# Patient Record
Sex: FEMALE | Race: OTHER | HISPANIC OR LATINO | URBAN - METROPOLITAN AREA
[De-identification: names, ages, dates, MRNs, and addresses within clinical notes are randomized per-mention and may not be internally consistent; named-entity substitution may affect disease eponyms.]

---

## 2017-04-05 ENCOUNTER — EMERGENCY (EMERGENCY)
Facility: HOSPITAL | Age: 47
LOS: 1 days | Discharge: HOME | End: 2017-04-05
Attending: EMERGENCY MEDICINE
Payer: COMMERCIAL

## 2017-04-05 VITALS
OXYGEN SATURATION: 98 % | DIASTOLIC BLOOD PRESSURE: 60 MMHG | RESPIRATION RATE: 18 BRPM | TEMPERATURE: 98 F | HEART RATE: 70 BPM | SYSTOLIC BLOOD PRESSURE: 111 MMHG

## 2017-04-05 VITALS
OXYGEN SATURATION: 100 % | DIASTOLIC BLOOD PRESSURE: 51 MMHG | TEMPERATURE: 98 F | SYSTOLIC BLOOD PRESSURE: 103 MMHG | HEIGHT: 63 IN | WEIGHT: 169.98 LBS | RESPIRATION RATE: 982 BRPM | HEART RATE: 62 BPM

## 2017-04-05 DIAGNOSIS — Z98.89 OTHER SPECIFIED POSTPROCEDURAL STATES: Chronic | ICD-10-CM

## 2017-04-05 DIAGNOSIS — Z98.51 TUBAL LIGATION STATUS: Chronic | ICD-10-CM

## 2017-04-05 DIAGNOSIS — N60.02 SOLITARY CYST OF LEFT BREAST: Chronic | ICD-10-CM

## 2017-04-05 DIAGNOSIS — M25.571 PAIN IN RIGHT ANKLE AND JOINTS OF RIGHT FOOT: Chronic | ICD-10-CM

## 2017-04-05 LAB
ALBUMIN SERPL ELPH-MCNC: 3.4 G/DL — LOW (ref 3.5–5)
ALP SERPL-CCNC: 67 U/L — SIGNIFICANT CHANGE UP (ref 40–120)
ALT FLD-CCNC: 27 U/L DA — SIGNIFICANT CHANGE UP (ref 10–60)
ANION GAP SERPL CALC-SCNC: 7 MMOL/L — SIGNIFICANT CHANGE UP (ref 5–17)
AST SERPL-CCNC: 20 U/L — SIGNIFICANT CHANGE UP (ref 10–40)
BASOPHILS # BLD AUTO: 0.1 K/UL — SIGNIFICANT CHANGE UP (ref 0–0.2)
BASOPHILS NFR BLD AUTO: 1.1 % — SIGNIFICANT CHANGE UP (ref 0–2)
BILIRUB SERPL-MCNC: 0.4 MG/DL — SIGNIFICANT CHANGE UP (ref 0.2–1.2)
BUN SERPL-MCNC: 11 MG/DL — SIGNIFICANT CHANGE UP (ref 7–18)
CALCIUM SERPL-MCNC: 8.4 MG/DL — SIGNIFICANT CHANGE UP (ref 8.4–10.5)
CHLORIDE SERPL-SCNC: 107 MMOL/L — SIGNIFICANT CHANGE UP (ref 96–108)
CK SERPL-CCNC: 95 U/L — SIGNIFICANT CHANGE UP (ref 21–215)
CO2 SERPL-SCNC: 27 MMOL/L — SIGNIFICANT CHANGE UP (ref 22–31)
CREAT SERPL-MCNC: 0.52 MG/DL — SIGNIFICANT CHANGE UP (ref 0.5–1.3)
EOSINOPHIL # BLD AUTO: 0.3 K/UL — SIGNIFICANT CHANGE UP (ref 0–0.5)
EOSINOPHIL NFR BLD AUTO: 3.5 % — SIGNIFICANT CHANGE UP (ref 0–6)
GLUCOSE SERPL-MCNC: 103 MG/DL — HIGH (ref 70–99)
HCG SERPL-ACNC: 4 MIU/ML — SIGNIFICANT CHANGE UP
HCT VFR BLD CALC: 42.1 % — SIGNIFICANT CHANGE UP (ref 34.5–45)
HGB BLD-MCNC: 13.8 G/DL — SIGNIFICANT CHANGE UP (ref 11.5–15.5)
LYMPHOCYTES # BLD AUTO: 3.4 K/UL — HIGH (ref 1–3.3)
LYMPHOCYTES # BLD AUTO: 35.9 % — SIGNIFICANT CHANGE UP (ref 13–44)
MCHC RBC-ENTMCNC: 29.8 PG — SIGNIFICANT CHANGE UP (ref 27–34)
MCHC RBC-ENTMCNC: 32.7 GM/DL — SIGNIFICANT CHANGE UP (ref 32–36)
MCV RBC AUTO: 90.9 FL — SIGNIFICANT CHANGE UP (ref 80–100)
MONOCYTES # BLD AUTO: 0.6 K/UL — SIGNIFICANT CHANGE UP (ref 0–0.9)
MONOCYTES NFR BLD AUTO: 6.1 % — SIGNIFICANT CHANGE UP (ref 2–14)
NEUTROPHILS # BLD AUTO: 5.1 K/UL — SIGNIFICANT CHANGE UP (ref 1.8–7.4)
NEUTROPHILS NFR BLD AUTO: 53.5 % — SIGNIFICANT CHANGE UP (ref 43–77)
PLATELET # BLD AUTO: 280 K/UL — SIGNIFICANT CHANGE UP (ref 150–400)
POTASSIUM SERPL-MCNC: 4.2 MMOL/L — SIGNIFICANT CHANGE UP (ref 3.5–5.3)
POTASSIUM SERPL-SCNC: 4.2 MMOL/L — SIGNIFICANT CHANGE UP (ref 3.5–5.3)
PROT SERPL-MCNC: 6.8 G/DL — SIGNIFICANT CHANGE UP (ref 6–8.3)
RBC # BLD: 4.63 M/UL — SIGNIFICANT CHANGE UP (ref 3.8–5.2)
RBC # FLD: 13 % — SIGNIFICANT CHANGE UP (ref 10.3–14.5)
SODIUM SERPL-SCNC: 141 MMOL/L — SIGNIFICANT CHANGE UP (ref 135–145)
TROPONIN I SERPL-MCNC: <0.015 NG/ML — SIGNIFICANT CHANGE UP (ref 0–0.04)
WBC # BLD: 9.6 K/UL — SIGNIFICANT CHANGE UP (ref 3.8–10.5)
WBC # FLD AUTO: 9.6 K/UL — SIGNIFICANT CHANGE UP (ref 3.8–10.5)

## 2017-04-05 PROCEDURE — 85027 COMPLETE CBC AUTOMATED: CPT

## 2017-04-05 PROCEDURE — 93005 ELECTROCARDIOGRAM TRACING: CPT

## 2017-04-05 PROCEDURE — 96374 THER/PROPH/DIAG INJ IV PUSH: CPT

## 2017-04-05 PROCEDURE — 99284 EMERGENCY DEPT VISIT MOD MDM: CPT | Mod: 25

## 2017-04-05 PROCEDURE — 80053 COMPREHEN METABOLIC PANEL: CPT

## 2017-04-05 PROCEDURE — 99285 EMERGENCY DEPT VISIT HI MDM: CPT

## 2017-04-05 PROCEDURE — 84484 ASSAY OF TROPONIN QUANT: CPT

## 2017-04-05 PROCEDURE — 71046 X-RAY EXAM CHEST 2 VIEWS: CPT

## 2017-04-05 PROCEDURE — 71020: CPT | Mod: 26

## 2017-04-05 PROCEDURE — 82550 ASSAY OF CK (CPK): CPT

## 2017-04-05 PROCEDURE — 84702 CHORIONIC GONADOTROPIN TEST: CPT

## 2017-04-05 RX ORDER — FAMOTIDINE 10 MG/ML
20 INJECTION INTRAVENOUS ONCE
Qty: 0 | Refills: 0 | Status: COMPLETED | OUTPATIENT
Start: 2017-04-05 | End: 2017-04-05

## 2017-04-05 RX ADMIN — FAMOTIDINE 20 MILLIGRAM(S): 10 INJECTION INTRAVENOUS at 14:29

## 2017-04-05 RX ADMIN — Medication 30 MILLILITER(S): at 14:29

## 2017-04-05 NOTE — ED PROVIDER NOTE - OBJECTIVE STATEMENT
45 y/o F pt w/ PMHx of DM presents to the ED c/o  chest pain last night x1 week. Pain is burning, radiates to L arm and intermittent. Denies fever chills, cough, recent travel or any other complaints. NKDA. 45 y/o F pt w/ PMHx of DM presents to the ED c/o  chest pain last night and once again 1 week ago. Pain is burning, radiates to L arm and intermittent. Denies fever chills, cough, recent travel or any other complaints. NKDA.

## 2017-04-05 NOTE — ED PROVIDER NOTE - MEDICAL DECISION MAKING DETAILS
45 y/o F pt w/ burning chest pain. Will get labs, chest X-ray. 2 troponin normal will dc home. 47 y/o F pt w/ burning chest pain reproducible on palpation. symptoms improved with GI cocktail. Will get labs, chest X-ray. troponin normal will dc home.

## 2017-04-09 DIAGNOSIS — E11.9 TYPE 2 DIABETES MELLITUS WITHOUT COMPLICATIONS: ICD-10-CM

## 2017-04-09 DIAGNOSIS — R07.9 CHEST PAIN, UNSPECIFIED: ICD-10-CM

## 2017-05-11 ENCOUNTER — EMERGENCY (EMERGENCY)
Facility: HOSPITAL | Age: 47
LOS: 1 days | Discharge: HOME | End: 2017-05-11
Attending: EMERGENCY MEDICINE
Payer: MEDICARE

## 2017-05-11 VITALS
DIASTOLIC BLOOD PRESSURE: 68 MMHG | RESPIRATION RATE: 16 BRPM | HEIGHT: 64 IN | WEIGHT: 154.98 LBS | HEART RATE: 78 BPM | SYSTOLIC BLOOD PRESSURE: 118 MMHG | OXYGEN SATURATION: 100 % | TEMPERATURE: 98 F

## 2017-05-11 DIAGNOSIS — Z98.89 OTHER SPECIFIED POSTPROCEDURAL STATES: Chronic | ICD-10-CM

## 2017-05-11 DIAGNOSIS — Z98.51 TUBAL LIGATION STATUS: Chronic | ICD-10-CM

## 2017-05-11 DIAGNOSIS — N60.02 SOLITARY CYST OF LEFT BREAST: Chronic | ICD-10-CM

## 2017-05-11 DIAGNOSIS — M25.571 PAIN IN RIGHT ANKLE AND JOINTS OF RIGHT FOOT: Chronic | ICD-10-CM

## 2017-05-11 PROCEDURE — 99283 EMERGENCY DEPT VISIT LOW MDM: CPT

## 2017-05-11 RX ORDER — IBUPROFEN 200 MG
1 TABLET ORAL
Qty: 28 | Refills: 0 | OUTPATIENT
Start: 2017-05-11 | End: 2017-05-18

## 2017-05-11 RX ORDER — IBUPROFEN 200 MG
600 TABLET ORAL ONCE
Qty: 0 | Refills: 0 | Status: COMPLETED | OUTPATIENT
Start: 2017-05-11 | End: 2017-05-11

## 2017-05-11 RX ORDER — MUPIROCIN 20 MG/G
1 OINTMENT TOPICAL
Qty: 45 | Refills: 0 | OUTPATIENT
Start: 2017-05-11 | End: 2017-05-21

## 2017-05-11 RX ADMIN — Medication 600 MILLIGRAM(S): at 22:24

## 2017-05-11 RX ADMIN — Medication 600 MILLIGRAM(S): at 21:56
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
## 2017-05-15 DIAGNOSIS — T21.12XA BURN OF FIRST DEGREE OF ABDOMINAL WALL, INITIAL ENCOUNTER: ICD-10-CM
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
## 2017-08-01 ENCOUNTER — EMERGENCY (EMERGENCY)
Facility: HOSPITAL | Age: 47
LOS: 1 days | Discharge: HOME | End: 2017-08-01
Attending: EMERGENCY MEDICINE
Payer: MEDICARE

## 2017-08-01 VITALS
HEIGHT: 63 IN | SYSTOLIC BLOOD PRESSURE: 98 MMHG | HEART RATE: 70 BPM | TEMPERATURE: 99 F | DIASTOLIC BLOOD PRESSURE: 62 MMHG | RESPIRATION RATE: 16 BRPM | OXYGEN SATURATION: 100 % | WEIGHT: 169.98 LBS
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
## 2017-08-01 DIAGNOSIS — Z79.1 LONG TERM (CURRENT) USE OF NON-STEROIDAL ANTI-INFLAMMATORIES (NSAID): ICD-10-CM
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
## 2017-08-01 DIAGNOSIS — E11.9 TYPE 2 DIABETES MELLITUS WITHOUT COMPLICATIONS: ICD-10-CM

## 2017-08-01 DIAGNOSIS — S89.91XA UNSPECIFIED INJURY OF RIGHT LOWER LEG, INITIAL ENCOUNTER: ICD-10-CM

## 2017-08-01 DIAGNOSIS — N60.02 SOLITARY CYST OF LEFT BREAST: Chronic | ICD-10-CM

## 2017-08-01 DIAGNOSIS — Z79.84 LONG TERM (CURRENT) USE OF ORAL HYPOGLYCEMIC DRUGS: ICD-10-CM

## 2017-08-01 DIAGNOSIS — Z98.89 OTHER SPECIFIED POSTPROCEDURAL STATES: Chronic | ICD-10-CM

## 2017-08-01 DIAGNOSIS — Z98.51 TUBAL LIGATION STATUS: Chronic | ICD-10-CM
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
## 2017-08-01 DIAGNOSIS — M25.461 EFFUSION, RIGHT KNEE: ICD-10-CM

## 2017-08-01 DIAGNOSIS — Z98.51 TUBAL LIGATION STATUS: ICD-10-CM

## 2017-08-01 DIAGNOSIS — M25.571 PAIN IN RIGHT ANKLE AND JOINTS OF RIGHT FOOT: Chronic | ICD-10-CM

## 2017-08-01 PROCEDURE — 29530 STRAPPING OF KNEE: CPT

## 2017-08-01 PROCEDURE — 29530 STRAPPING OF KNEE: CPT | Mod: RT

## 2017-08-01 PROCEDURE — 73562 X-RAY EXAM OF KNEE 3: CPT | Mod: 26,RT

## 2017-08-01 PROCEDURE — 99283 EMERGENCY DEPT VISIT LOW MDM: CPT | Mod: 25

## 2017-08-01 PROCEDURE — 73562 X-RAY EXAM OF KNEE 3: CPT

## 2017-08-01 RX ORDER — IBUPROFEN 200 MG
1 TABLET ORAL
Qty: 20 | Refills: 0 | OUTPATIENT
Start: 2017-08-01 | End: 2017-08-06

## 2017-08-01 RX ORDER — IBUPROFEN 200 MG
600 TABLET ORAL ONCE
Qty: 0 | Refills: 0 | Status: COMPLETED | OUTPATIENT
Start: 2017-08-01 | End: 2017-08-01

## 2017-08-01 RX ADMIN — Medication 600 MILLIGRAM(S): at 19:22

## 2017-08-01 NOTE — ED PROVIDER NOTE - PHYSICAL EXAMINATION
Right knee anterior swelling with mild effusion. Localized medial bony tenderness to palpation. Limited ROM knee secondary to pain. No skin breakdown. No laxity during varus/valgus stress.

## 2017-08-01 NOTE — ED PROVIDER NOTE - PSH
Ankle arthralgia, right  surgey  Breast cyst, left    S/P  section  twice  S/P knee surgery  right  S/P knee surgery  right knee twice  S/P shoulder surgery  right  S/P tubal ligation  17 years ago

## 2017-08-01 NOTE — ED ADULT NURSE NOTE - OBJECTIVE STATEMENT
pt from home c/o of Rt knee pain s/p trip and fall 2 days ago pt is alert awake oriented x3 Rt knee skin dry and intact no edema noted

## 2017-08-01 NOTE — ED PROVIDER NOTE - PROGRESS NOTE DETAILS
xray shows no signs of fracture or dislocation. Will apply ACE bandage, ice, knee immobilizer, crutches and patient will need to follow up with her orthopedist in 1 week. Pt is well appearing walking with steady gait, stable for discharge and follow up without fail with medical doctor. I had a detailed discussion with the patient and/or guardian regarding the historical points, exam findings, and any diagnostic results supporting the discharge diagnosis. Pt educated on care and need for follow up. Strict return instructions and red flag signs and symptoms discussed with patient. Questions answered. Pt shows understanding of discharge information and agrees to follow.

## 2017-08-01 NOTE — ED PROVIDER NOTE - MEDICAL DECISION MAKING DETAILS
46 year-old female, presents s/p right knee injury 3 days ago. Appears uncomfortable, vital signs within normal limits, afebrile. Plan: xray r/o fracture, knee immobilizer, crutches and orth follow up.

## 2017-08-01 NOTE — ED ADULT NURSE NOTE - DISCHARGE TEACHING
crutches instruction given via teach back and demonstration pt ambulated steadily with crutches in hallway

## 2017-08-01 NOTE — ED PROCEDURE NOTE - CPROC ED POST PROC CARE GUIDE1
Instructed patient/caregiver to follow-up with primary care physician./Elevate the injured extremity as instructed./Instructed patient/caregiver regarding signs and symptoms of infection./Verbal/written post procedure instructions were given to patient/caregiver./Keep the cast/splint/dressing clean and dry.

## 2017-08-01 NOTE — ED PROVIDER NOTE - OBJECTIVE STATEMENT
46 year-old female, history of right knee ligament tear (scheduled for surgery this month), presents with cc right knee injury 3 days ago. While walking, lost balance over an uneven surface and twisted right knee and fell forward landing on the knee. Was able to walk with difficulty. Now c/o right knee sharp anterior-medial pain, continuous, localized, worse with ambulation, no alleviating factors, associated with swelling. Also c/o feeling of knee instability while walking. Denies numbness, tingling, back pain, neck pain, other injuries or any other complaints.

## 2017-08-08 ENCOUNTER — OUTPATIENT (OUTPATIENT)
Dept: OUTPATIENT SERVICES | Facility: HOSPITAL | Age: 47
LOS: 1 days | Discharge: HOME | End: 2017-08-08
Payer: MEDICARE

## 2017-08-08 VITALS
HEIGHT: 63 IN | OXYGEN SATURATION: 98 % | SYSTOLIC BLOOD PRESSURE: 90 MMHG | DIASTOLIC BLOOD PRESSURE: 55 MMHG | RESPIRATION RATE: 18 BRPM | HEART RATE: 67 BPM | TEMPERATURE: 98 F | WEIGHT: 167.11 LBS

## 2017-08-08 DIAGNOSIS — Z98.89 OTHER SPECIFIED POSTPROCEDURAL STATES: Chronic | ICD-10-CM

## 2017-08-08 DIAGNOSIS — Z01.818 ENCOUNTER FOR OTHER PREPROCEDURAL EXAMINATION: ICD-10-CM

## 2017-08-08 DIAGNOSIS — M25.569 PAIN IN UNSPECIFIED KNEE: ICD-10-CM

## 2017-08-08 DIAGNOSIS — Z98.890 OTHER SPECIFIED POSTPROCEDURAL STATES: Chronic | ICD-10-CM

## 2017-08-08 DIAGNOSIS — N60.02 SOLITARY CYST OF LEFT BREAST: Chronic | ICD-10-CM

## 2017-08-08 DIAGNOSIS — E11.9 TYPE 2 DIABETES MELLITUS WITHOUT COMPLICATIONS: ICD-10-CM

## 2017-08-08 DIAGNOSIS — S83.261D PERIPHERAL TEAR OF LATERAL MENISCUS, CURRENT INJURY, RIGHT KNEE, SUBSEQUENT ENCOUNTER: ICD-10-CM

## 2017-08-08 DIAGNOSIS — M25.571 PAIN IN RIGHT ANKLE AND JOINTS OF RIGHT FOOT: Chronic | ICD-10-CM

## 2017-08-08 DIAGNOSIS — Z98.51 TUBAL LIGATION STATUS: Chronic | ICD-10-CM

## 2017-08-08 LAB
ANION GAP SERPL CALC-SCNC: 5 MMOL/L — SIGNIFICANT CHANGE UP (ref 5–17)
APPEARANCE UR: CLEAR — SIGNIFICANT CHANGE UP
BASOPHILS # BLD AUTO: 0.1 K/UL — SIGNIFICANT CHANGE UP (ref 0–0.2)
BASOPHILS NFR BLD AUTO: 0.7 % — SIGNIFICANT CHANGE UP (ref 0–2)
BILIRUB UR-MCNC: NEGATIVE — SIGNIFICANT CHANGE UP
BUN SERPL-MCNC: 8 MG/DL — SIGNIFICANT CHANGE UP (ref 7–23)
CALCIUM SERPL-MCNC: 8.8 MG/DL — SIGNIFICANT CHANGE UP (ref 8.5–10.1)
CHLORIDE SERPL-SCNC: 105 MMOL/L — SIGNIFICANT CHANGE UP (ref 96–108)
CO2 SERPL-SCNC: 31 MMOL/L — SIGNIFICANT CHANGE UP (ref 22–31)
COLOR SPEC: YELLOW — SIGNIFICANT CHANGE UP
CREAT SERPL-MCNC: 0.56 MG/DL — SIGNIFICANT CHANGE UP (ref 0.5–1.3)
DIFF PNL FLD: ABNORMAL
EOSINOPHIL # BLD AUTO: 0.2 K/UL — SIGNIFICANT CHANGE UP (ref 0–0.5)
EOSINOPHIL NFR BLD AUTO: 1.6 % — SIGNIFICANT CHANGE UP (ref 0–6)
GLUCOSE SERPL-MCNC: 115 MG/DL — HIGH (ref 70–99)
GLUCOSE UR QL: NEGATIVE MG/DL — SIGNIFICANT CHANGE UP
HCT VFR BLD CALC: 41.9 % — SIGNIFICANT CHANGE UP (ref 34.5–45)
HGB BLD-MCNC: 13.6 G/DL — SIGNIFICANT CHANGE UP (ref 11.5–15.5)
KETONES UR-MCNC: NEGATIVE — SIGNIFICANT CHANGE UP
LEUKOCYTE ESTERASE UR-ACNC: ABNORMAL
LYMPHOCYTES # BLD AUTO: 2.7 K/UL — SIGNIFICANT CHANGE UP (ref 1–3.3)
LYMPHOCYTES # BLD AUTO: 23.9 % — SIGNIFICANT CHANGE UP (ref 13–44)
MCHC RBC-ENTMCNC: 31 PG — SIGNIFICANT CHANGE UP (ref 27–34)
MCHC RBC-ENTMCNC: 32.5 GM/DL — SIGNIFICANT CHANGE UP (ref 32–36)
MCV RBC AUTO: 95.4 FL — SIGNIFICANT CHANGE UP (ref 80–100)
MONOCYTES # BLD AUTO: 0.7 K/UL — SIGNIFICANT CHANGE UP (ref 0–0.9)
MONOCYTES NFR BLD AUTO: 6.6 % — SIGNIFICANT CHANGE UP (ref 2–14)
NEUTROPHILS # BLD AUTO: 7.5 K/UL — HIGH (ref 1.8–7.4)
NEUTROPHILS NFR BLD AUTO: 67.2 % — SIGNIFICANT CHANGE UP (ref 43–77)
NITRITE UR-MCNC: NEGATIVE — SIGNIFICANT CHANGE UP
PH UR: 7 — SIGNIFICANT CHANGE UP (ref 5–8)
PLATELET # BLD AUTO: 294 K/UL — SIGNIFICANT CHANGE UP (ref 150–400)
POTASSIUM SERPL-MCNC: 4 MMOL/L — SIGNIFICANT CHANGE UP (ref 3.5–5.3)
POTASSIUM SERPL-SCNC: 4 MMOL/L — SIGNIFICANT CHANGE UP (ref 3.5–5.3)
PROT UR-MCNC: NEGATIVE MG/DL — SIGNIFICANT CHANGE UP
RBC # BLD: 4.39 M/UL — SIGNIFICANT CHANGE UP (ref 3.8–5.2)
RBC # FLD: 11.9 % — SIGNIFICANT CHANGE UP (ref 11–15)
SODIUM SERPL-SCNC: 141 MMOL/L — SIGNIFICANT CHANGE UP (ref 135–145)
SP GR SPEC: 1 — LOW (ref 1.01–1.02)
UROBILINOGEN FLD QL: NEGATIVE MG/DL — SIGNIFICANT CHANGE UP
WBC # BLD: 11.2 K/UL — HIGH (ref 3.8–10.5)
WBC # FLD AUTO: 11.2 K/UL — HIGH (ref 3.8–10.5)

## 2017-08-08 PROCEDURE — 93010 ELECTROCARDIOGRAM REPORT: CPT | Mod: NC

## 2017-08-08 NOTE — H&P PST ADULT - HISTORY OF PRESENT ILLNESS
47 yo  female c/o recurrent right knee pain - had surgery for knee in the past- evaluated- torn meniscus, scheduled for repair  spoke in patient's native language and [employer]

## 2017-08-08 NOTE — H&P PST ADULT - PSH
Ankle arthralgia, right  surgey  Breast cyst, left    S/P  section  twice  S/P knee surgery  right knee twice  S/P knee surgery  right  S/P plastic surgery  abdominalplasty  S/P shoulder surgery  right  S/P tubal ligation  17 years ago

## 2017-08-08 NOTE — H&P PST ADULT - NSANTHOSAYNRD_GEN_A_CORE
No. THUY screening performed.  STOP BANG Legend: 0-2 = LOW Risk; 3-4 = INTERMEDIATE Risk; 5-8 = HIGH Risk

## 2017-08-09 LAB
CULTURE RESULTS: SIGNIFICANT CHANGE UP
SPECIMEN SOURCE: SIGNIFICANT CHANGE UP

## 2017-08-16 ENCOUNTER — OUTPATIENT (OUTPATIENT)
Dept: OUTPATIENT SERVICES | Facility: HOSPITAL | Age: 47
LOS: 1 days | Discharge: HOME | End: 2017-08-16
Payer: MEDICARE

## 2017-08-16 ENCOUNTER — RESULT REVIEW (OUTPATIENT)
Age: 47
End: 2017-08-16

## 2017-08-16 ENCOUNTER — TRANSCRIPTION ENCOUNTER (OUTPATIENT)
Age: 47
End: 2017-08-16

## 2017-08-16 VITALS
SYSTOLIC BLOOD PRESSURE: 102 MMHG | RESPIRATION RATE: 16 BRPM | OXYGEN SATURATION: 96 % | HEART RATE: 72 BPM | DIASTOLIC BLOOD PRESSURE: 48 MMHG

## 2017-08-16 VITALS
OXYGEN SATURATION: 96 % | SYSTOLIC BLOOD PRESSURE: 98 MMHG | RESPIRATION RATE: 18 BRPM | TEMPERATURE: 97 F | HEART RATE: 72 BPM | HEIGHT: 63 IN | DIASTOLIC BLOOD PRESSURE: 47 MMHG | WEIGHT: 167.99 LBS

## 2017-08-16 DIAGNOSIS — Z98.89 OTHER SPECIFIED POSTPROCEDURAL STATES: Chronic | ICD-10-CM

## 2017-08-16 DIAGNOSIS — Z98.51 TUBAL LIGATION STATUS: Chronic | ICD-10-CM

## 2017-08-16 DIAGNOSIS — M25.571 PAIN IN RIGHT ANKLE AND JOINTS OF RIGHT FOOT: Chronic | ICD-10-CM

## 2017-08-16 DIAGNOSIS — Z98.890 OTHER SPECIFIED POSTPROCEDURAL STATES: Chronic | ICD-10-CM

## 2017-08-16 DIAGNOSIS — N60.02 SOLITARY CYST OF LEFT BREAST: Chronic | ICD-10-CM

## 2017-08-16 PROCEDURE — 88304 TISSUE EXAM BY PATHOLOGIST: CPT | Mod: 26

## 2017-08-16 RX ORDER — METFORMIN HYDROCHLORIDE 850 MG/1
1 TABLET ORAL
Qty: 0 | Refills: 0 | COMMUNITY

## 2017-08-16 RX ORDER — FENTANYL CITRATE 50 UG/ML
50 INJECTION INTRAVENOUS
Qty: 0 | Refills: 0 | Status: DISCONTINUED | OUTPATIENT
Start: 2017-08-16 | End: 2017-08-16

## 2017-08-16 RX ORDER — FENTANYL CITRATE 50 UG/ML
25 INJECTION INTRAVENOUS
Qty: 0 | Refills: 0 | Status: DISCONTINUED | OUTPATIENT
Start: 2017-08-16 | End: 2017-08-16

## 2017-08-16 RX ORDER — SODIUM CHLORIDE 9 MG/ML
1000 INJECTION, SOLUTION INTRAVENOUS
Qty: 0 | Refills: 0 | Status: DISCONTINUED | OUTPATIENT
Start: 2017-08-16 | End: 2017-08-16

## 2017-08-16 RX ORDER — ATORVASTATIN CALCIUM 80 MG/1
1 TABLET, FILM COATED ORAL
Qty: 0 | Refills: 0 | COMMUNITY

## 2017-08-16 RX ADMIN — FENTANYL CITRATE 50 MICROGRAM(S): 50 INJECTION INTRAVENOUS at 10:53

## 2017-08-16 RX ADMIN — FENTANYL CITRATE 50 MICROGRAM(S): 50 INJECTION INTRAVENOUS at 13:03

## 2017-08-16 RX ADMIN — FENTANYL CITRATE 50 MICROGRAM(S): 50 INJECTION INTRAVENOUS at 11:26

## 2017-08-16 RX ADMIN — FENTANYL CITRATE 50 MICROGRAM(S): 50 INJECTION INTRAVENOUS at 13:04

## 2017-08-16 RX ADMIN — SODIUM CHLORIDE 73 MILLILITER(S): 9 INJECTION, SOLUTION INTRAVENOUS at 10:55

## 2017-08-16 NOTE — ASU DISCHARGE PLAN (ADULT/PEDIATRIC). - MEDICATION SUMMARY - MEDICATIONS TO STOP TAKING
I will STOP taking the medications listed below when I get home from the hospital:     mg oral tablet  -- 1 tab(s) by mouth every 6 hours, As Needed - for moderate pain  -- Do not take this drug if you are pregnant.  It is very important that you take or use this exactly as directed.  Do not skip doses or discontinue unless directed by your doctor.  May cause drowsiness or dizziness.  Obtain medical advice before taking any non-prescription drugs as some may affect the action of this medication.  Take with food or milk.     mg oral tablet  -- 1 tab(s) by mouth every 6 hours, As Needed for pain/fever Take with food  -- Do not take this drug if you are pregnant.  It is very important that you take or use this exactly as directed.  Do not skip doses or discontinue unless directed by your doctor.  May cause drowsiness or dizziness.  Obtain medical advice before taking any non-prescription drugs as some may affect the action of this medication.  Take with food or milk.

## 2017-08-16 NOTE — BRIEF OPERATIVE NOTE - POST-OP DX
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

## 2017-08-16 NOTE — ASU DISCHARGE PLAN (ADULT/PEDIATRIC). - MEDICATION SUMMARY - MEDICATIONS TO TAKE
I will START or STAY ON the medications listed below when I get home from the hospital:    oxyCODONE-acetaminophen 5 mg-325 mg oral tablet  -- 1 tab(s) by mouth every 4 hours, As Needed -for severe pain MDD:6  -- Caution federal law prohibits the transfer of this drug to any person other  than the person for whom it was prescribed.  May cause drowsiness.  Alcohol may intensify this effect.  Use care when operating dangerous machinery.  This prescription cannot be refilled.  This product contains acetaminophen.  Do not use  with any other product containing acetaminophen to prevent possible liver damage.  Using more of this medication than prescribed may cause serious breathing problems.    -- Indication: For pain    PARoxetine 20 mg oral tablet  -- 1 tab(s) by mouth once a day  -- Indication: For prior med    metFORMIN 500 mg oral tablet  -- 1 tab(s) by mouth 2 times a day  -- Indication: For prior med    atorvastatin 10 mg oral tablet  -- 1 tab(s) by mouth once a day  -- Indication: For prior med    Bactrim  mg-160 mg oral tablet  -- 1 tab(s) by mouth 2 times a day  -- Indication: For prior med

## 2017-08-16 NOTE — ASU DISCHARGE PLAN (ADULT/PEDIATRIC). - NOTIFY
Numbness, color, or temperature change to extremity/Fever greater than 101/Swelling that continues/Numbness, tingling/Bleeding that does not stop

## 2017-08-17 LAB — SURGICAL PATHOLOGY FINAL REPORT - CH: SIGNIFICANT CHANGE UP

## 2017-08-21 DIAGNOSIS — M25.561 PAIN IN RIGHT KNEE: ICD-10-CM

## 2017-08-21 DIAGNOSIS — M23.200 DERANGEMENT OF UNSPECIFIED LATERAL MENISCUS DUE TO OLD TEAR OR INJURY, RIGHT KNEE: ICD-10-CM

## 2017-08-21 DIAGNOSIS — E11.9 TYPE 2 DIABETES MELLITUS WITHOUT COMPLICATIONS: ICD-10-CM

## 2017-08-21 DIAGNOSIS — Z79.84 LONG TERM (CURRENT) USE OF ORAL HYPOGLYCEMIC DRUGS: ICD-10-CM

## 2018-05-01 ENCOUNTER — OUTPATIENT (OUTPATIENT)
Dept: OUTPATIENT SERVICES | Facility: HOSPITAL | Age: 48
LOS: 1 days | End: 2018-05-01
Payer: MEDICAID

## 2018-05-01 DIAGNOSIS — Z98.89 OTHER SPECIFIED POSTPROCEDURAL STATES: Chronic | ICD-10-CM

## 2018-05-01 DIAGNOSIS — M25.571 PAIN IN RIGHT ANKLE AND JOINTS OF RIGHT FOOT: Chronic | ICD-10-CM

## 2018-05-01 DIAGNOSIS — Z98.890 OTHER SPECIFIED POSTPROCEDURAL STATES: Chronic | ICD-10-CM

## 2018-05-01 DIAGNOSIS — Z98.51 TUBAL LIGATION STATUS: Chronic | ICD-10-CM

## 2018-05-01 DIAGNOSIS — N60.02 SOLITARY CYST OF LEFT BREAST: Chronic | ICD-10-CM

## 2018-05-01 PROCEDURE — G9001: CPT

## 2018-05-08 ENCOUNTER — EMERGENCY (EMERGENCY)
Facility: HOSPITAL | Age: 48
LOS: 1 days | Discharge: HOME | End: 2018-05-08
Attending: EMERGENCY MEDICINE
Payer: MEDICARE

## 2018-05-08 VITALS — WEIGHT: 160.06 LBS

## 2018-05-08 DIAGNOSIS — Z98.89 OTHER SPECIFIED POSTPROCEDURAL STATES: Chronic | ICD-10-CM

## 2018-05-08 DIAGNOSIS — Z98.51 TUBAL LIGATION STATUS: Chronic | ICD-10-CM

## 2018-05-08 DIAGNOSIS — N60.02 SOLITARY CYST OF LEFT BREAST: Chronic | ICD-10-CM

## 2018-05-08 DIAGNOSIS — Z98.890 OTHER SPECIFIED POSTPROCEDURAL STATES: Chronic | ICD-10-CM

## 2018-05-08 DIAGNOSIS — M25.571 PAIN IN RIGHT ANKLE AND JOINTS OF RIGHT FOOT: Chronic | ICD-10-CM

## 2018-05-08 LAB
ALBUMIN SERPL ELPH-MCNC: 3.4 G/DL — LOW (ref 3.5–5)
ALP SERPL-CCNC: 86 U/L — SIGNIFICANT CHANGE UP (ref 40–120)
ALT FLD-CCNC: 19 U/L DA — SIGNIFICANT CHANGE UP (ref 10–60)
ANION GAP SERPL CALC-SCNC: 4 MMOL/L — LOW (ref 5–17)
APTT BLD: 38.3 SEC — HIGH (ref 27.5–37.4)
AST SERPL-CCNC: 8 U/L — LOW (ref 10–40)
BASOPHILS # BLD AUTO: 0.1 K/UL — SIGNIFICANT CHANGE UP (ref 0–0.2)
BASOPHILS NFR BLD AUTO: 0.8 % — SIGNIFICANT CHANGE UP (ref 0–2)
BILIRUB SERPL-MCNC: 0.3 MG/DL — SIGNIFICANT CHANGE UP (ref 0.2–1.2)
BUN SERPL-MCNC: 7 MG/DL — SIGNIFICANT CHANGE UP (ref 7–18)
CALCIUM SERPL-MCNC: 8.5 MG/DL — SIGNIFICANT CHANGE UP (ref 8.4–10.5)
CHLORIDE SERPL-SCNC: 108 MMOL/L — SIGNIFICANT CHANGE UP (ref 96–108)
CO2 SERPL-SCNC: 30 MMOL/L — SIGNIFICANT CHANGE UP (ref 22–31)
CREAT SERPL-MCNC: 0.75 MG/DL — SIGNIFICANT CHANGE UP (ref 0.5–1.3)
EOSINOPHIL # BLD AUTO: 0.3 K/UL — SIGNIFICANT CHANGE UP (ref 0–0.5)
EOSINOPHIL NFR BLD AUTO: 2.8 % — SIGNIFICANT CHANGE UP (ref 0–6)
GLUCOSE SERPL-MCNC: 184 MG/DL — HIGH (ref 70–99)
HCG SERPL-ACNC: 4 MIU/ML — SIGNIFICANT CHANGE UP
HCT VFR BLD CALC: 40.5 % — SIGNIFICANT CHANGE UP (ref 34.5–45)
HGB BLD-MCNC: 12.9 G/DL — SIGNIFICANT CHANGE UP (ref 11.5–15.5)
INR BLD: 1.01 RATIO — SIGNIFICANT CHANGE UP (ref 0.88–1.16)
LYMPHOCYTES # BLD AUTO: 3.2 K/UL — SIGNIFICANT CHANGE UP (ref 1–3.3)
LYMPHOCYTES # BLD AUTO: 30.9 % — SIGNIFICANT CHANGE UP (ref 13–44)
MCHC RBC-ENTMCNC: 29.3 PG — SIGNIFICANT CHANGE UP (ref 27–34)
MCHC RBC-ENTMCNC: 31.9 GM/DL — LOW (ref 32–36)
MCV RBC AUTO: 92 FL — SIGNIFICANT CHANGE UP (ref 80–100)
MONOCYTES # BLD AUTO: 0.5 K/UL — SIGNIFICANT CHANGE UP (ref 0–0.9)
MONOCYTES NFR BLD AUTO: 5.2 % — SIGNIFICANT CHANGE UP (ref 2–14)
NEUTROPHILS # BLD AUTO: 6.3 K/UL — SIGNIFICANT CHANGE UP (ref 1.8–7.4)
NEUTROPHILS NFR BLD AUTO: 60.3 % — SIGNIFICANT CHANGE UP (ref 43–77)
PLATELET # BLD AUTO: 280 K/UL — SIGNIFICANT CHANGE UP (ref 150–400)
POTASSIUM SERPL-MCNC: 3.6 MMOL/L — SIGNIFICANT CHANGE UP (ref 3.5–5.3)
POTASSIUM SERPL-SCNC: 3.6 MMOL/L — SIGNIFICANT CHANGE UP (ref 3.5–5.3)
PROT SERPL-MCNC: 6.8 G/DL — SIGNIFICANT CHANGE UP (ref 6–8.3)
PROTHROM AB SERPL-ACNC: 11 SEC — SIGNIFICANT CHANGE UP (ref 9.8–12.7)
RBC # BLD: 4.4 M/UL — SIGNIFICANT CHANGE UP (ref 3.8–5.2)
RBC # FLD: 12.3 % — SIGNIFICANT CHANGE UP (ref 10.3–14.5)
SODIUM SERPL-SCNC: 142 MMOL/L — SIGNIFICANT CHANGE UP (ref 135–145)
WBC # BLD: 10.4 K/UL — SIGNIFICANT CHANGE UP (ref 3.8–10.5)
WBC # FLD AUTO: 10.4 K/UL — SIGNIFICANT CHANGE UP (ref 3.8–10.5)

## 2018-05-08 PROCEDURE — 99285 EMERGENCY DEPT VISIT HI MDM: CPT | Mod: 25

## 2018-05-08 RX ORDER — SODIUM CHLORIDE 9 MG/ML
3 INJECTION INTRAMUSCULAR; INTRAVENOUS; SUBCUTANEOUS ONCE
Qty: 0 | Refills: 0 | Status: COMPLETED | OUTPATIENT
Start: 2018-05-08 | End: 2018-05-08

## 2018-05-08 RX ORDER — ACETAMINOPHEN 500 MG
1000 TABLET ORAL ONCE
Qty: 0 | Refills: 0 | Status: COMPLETED | OUTPATIENT
Start: 2018-05-08 | End: 2018-05-08

## 2018-05-08 RX ORDER — METOCLOPRAMIDE HCL 10 MG
10 TABLET ORAL ONCE
Qty: 0 | Refills: 0 | Status: COMPLETED | OUTPATIENT
Start: 2018-05-08 | End: 2018-05-08

## 2018-05-08 RX ADMIN — Medication 10 MILLIGRAM(S): at 23:28

## 2018-05-08 RX ADMIN — SODIUM CHLORIDE 3 MILLILITER(S): 9 INJECTION INTRAMUSCULAR; INTRAVENOUS; SUBCUTANEOUS at 23:26

## 2018-05-08 RX ADMIN — Medication 400 MILLIGRAM(S): at 23:28

## 2018-05-08 NOTE — ED PROVIDER NOTE - PSH
Ankle arthralgia, right  surgey  Breast cyst, left    S/P  section  twice  S/P knee surgery  right  S/P knee surgery  right knee twice  S/P plastic surgery  abdominalplasty  S/P shoulder surgery  right  S/P tubal ligation  17 years ago

## 2018-05-08 NOTE — ED PROVIDER NOTE - PROGRESS NOTE DETAILS
labs unremarkable, CT shows no acute intracranial process.   On reeval pt reports improvement of headache. Patient stable for discharge to f/u with PMD.

## 2018-05-08 NOTE — ED PROVIDER NOTE - CONDUCTED A DETAILED DISCUSSION WITH PATIENT AND/OR GUARDIAN REGARDING, MDM
need for outpatient follow-up/radiology results/lab results need for outpatient follow-up/return to ED if symptoms worsen, persist or questions arise/lab results/radiology results
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
## 2018-05-08 NOTE — ED PROVIDER NOTE - CRANIAL NERVE AND PUPILLARY EXAM
gag reflex intact/tongue is midline/cough reflex intact/corneal reflex intact/central and peripheral vision intact/central vision intact/extra-ocular movements intact/peripheral vision intact

## 2018-05-08 NOTE — ED PROVIDER NOTE - CARE PLAN
Principal Discharge DX:	Closed head injury, initial encounter  Secondary Diagnosis:	Contusion of scalp, initial encounter

## 2018-05-08 NOTE — ED ADULT NURSE NOTE - OBJECTIVE STATEMENT
47 Years old female presented to ED c/o headache, stated that a door fel onto her head , patient reported LOC, dizziness and headache. P/S 10/10. ED physician is aware of patient.
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
## 2018-05-09 VITALS
OXYGEN SATURATION: 100 % | DIASTOLIC BLOOD PRESSURE: 50 MMHG | SYSTOLIC BLOOD PRESSURE: 110 MMHG | HEART RATE: 61 BPM | TEMPERATURE: 98 F | RESPIRATION RATE: 16 BRPM

## 2018-05-09 DIAGNOSIS — R69 ILLNESS, UNSPECIFIED: ICD-10-CM

## 2018-05-09 PROCEDURE — 85610 PROTHROMBIN TIME: CPT

## 2018-05-09 PROCEDURE — 99284 EMERGENCY DEPT VISIT MOD MDM: CPT | Mod: 25

## 2018-05-09 PROCEDURE — 85027 COMPLETE CBC AUTOMATED: CPT

## 2018-05-09 PROCEDURE — 96374 THER/PROPH/DIAG INJ IV PUSH: CPT

## 2018-05-09 PROCEDURE — 80053 COMPREHEN METABOLIC PANEL: CPT

## 2018-05-09 PROCEDURE — 70450 CT HEAD/BRAIN W/O DYE: CPT

## 2018-05-09 PROCEDURE — 84702 CHORIONIC GONADOTROPIN TEST: CPT

## 2018-05-09 PROCEDURE — 70450 CT HEAD/BRAIN W/O DYE: CPT | Mod: 26

## 2018-05-09 PROCEDURE — 96375 TX/PRO/DX INJ NEW DRUG ADDON: CPT

## 2018-05-09 PROCEDURE — 85730 THROMBOPLASTIN TIME PARTIAL: CPT

## 2018-05-09 RX ORDER — IBUPROFEN 200 MG
1 TABLET ORAL
Qty: 40 | Refills: 0 | OUTPATIENT
Start: 2018-05-09 | End: 2018-05-18

## 2018-05-09 RX ORDER — KETOROLAC TROMETHAMINE 30 MG/ML
30 SYRINGE (ML) INJECTION ONCE
Qty: 0 | Refills: 0 | Status: DISCONTINUED | OUTPATIENT
Start: 2018-05-09 | End: 2018-05-09

## 2018-05-09 RX ADMIN — Medication 1000 MILLIGRAM(S): at 01:17

## 2018-05-09 RX ADMIN — Medication 30 MILLIGRAM(S): at 02:19

## 2018-06-10 ENCOUNTER — EMERGENCY (EMERGENCY)
Facility: HOSPITAL | Age: 48
LOS: 1 days | Discharge: HOME | End: 2018-06-10
Attending: EMERGENCY MEDICINE
Payer: MEDICARE

## 2018-06-10 VITALS
SYSTOLIC BLOOD PRESSURE: 107 MMHG | TEMPERATURE: 98 F | DIASTOLIC BLOOD PRESSURE: 71 MMHG | HEART RATE: 70 BPM | RESPIRATION RATE: 16 BRPM | OXYGEN SATURATION: 96 %

## 2018-06-10 DIAGNOSIS — N60.02 SOLITARY CYST OF LEFT BREAST: Chronic | ICD-10-CM

## 2018-06-10 DIAGNOSIS — Z98.89 OTHER SPECIFIED POSTPROCEDURAL STATES: Chronic | ICD-10-CM

## 2018-06-10 DIAGNOSIS — Z98.890 OTHER SPECIFIED POSTPROCEDURAL STATES: Chronic | ICD-10-CM

## 2018-06-10 DIAGNOSIS — M25.571 PAIN IN RIGHT ANKLE AND JOINTS OF RIGHT FOOT: Chronic | ICD-10-CM

## 2018-06-10 DIAGNOSIS — Z98.51 TUBAL LIGATION STATUS: Chronic | ICD-10-CM

## 2018-06-10 PROCEDURE — 96374 THER/PROPH/DIAG INJ IV PUSH: CPT

## 2018-06-10 PROCEDURE — 99284 EMERGENCY DEPT VISIT MOD MDM: CPT | Mod: 25

## 2018-06-10 PROCEDURE — 96375 TX/PRO/DX INJ NEW DRUG ADDON: CPT

## 2018-06-10 RX ORDER — KETOROLAC TROMETHAMINE 30 MG/ML
30 SYRINGE (ML) INJECTION ONCE
Qty: 0 | Refills: 0 | Status: DISCONTINUED | OUTPATIENT
Start: 2018-06-10 | End: 2018-06-10

## 2018-06-10 RX ORDER — METOCLOPRAMIDE HCL 10 MG
10 TABLET ORAL ONCE
Qty: 0 | Refills: 0 | Status: COMPLETED | OUTPATIENT
Start: 2018-06-10 | End: 2018-06-10

## 2018-06-10 RX ADMIN — Medication 10 MILLIGRAM(S): at 00:56

## 2018-06-10 RX ADMIN — Medication 30 MILLIGRAM(S): at 00:56

## 2018-06-10 NOTE — ED PROVIDER NOTE - MEDICAL DECISION MAKING DETAILS
iv, analgesia, reassess    headache resolved. pt asking to go home. return if headache recurs, fevers, or any concerning sx.

## 2018-06-10 NOTE — ED PROVIDER NOTE - CONDUCTED A DETAILED DISCUSSION WITH PATIENT AND/OR GUARDIAN REGARDING, MDM
lab results/need for outpatient follow-up need for outpatient follow-up/return to ED if symptoms worsen, persist or questions arise
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
## 2018-06-10 NOTE — ED PROVIDER NOTE - OBJECTIVE STATEMENT
46 y/o F pt with a PMHx of DM and HLD and a PSHx of , knee surgery, plastic surgery, shoulder surgery and tubal ligation presents to the ED c/o intermittent headache and nausea with an onset of this morning. Pt reports feeling better now. Pt denies any other complaints. NKDA. 46 y/o F pt with a PMHx of DM and HLD and a PSHx of , knee surgery, plastic surgery, shoulder surgery and tubal ligation presents to the ED c/o intermittent headache and nausea with an onset of this morning, similar to prior episodes, seeing neurologist for it. No trauma, fevers or any recent infections. Pt reports feeling better now. Pt denies any other complaints. NKDA.

## 2018-06-10 NOTE — ED PROVIDER NOTE - CRANIAL NERVE AND PUPILLARY EXAM
peripheral vision intact/gag reflex intact/tongue is midline/corneal reflex intact/central vision intact/cough reflex intact/cranial nerves 2-12 intact/central and peripheral vision intact/extra-ocular movements intact

## 2018-07-03 ENCOUNTER — EMERGENCY (EMERGENCY)
Facility: HOSPITAL | Age: 48
LOS: 1 days | Discharge: HOME | End: 2018-07-03
Attending: EMERGENCY MEDICINE
Payer: MEDICARE

## 2018-07-03 VITALS
RESPIRATION RATE: 18 BRPM | OXYGEN SATURATION: 98 % | HEART RATE: 66 BPM | DIASTOLIC BLOOD PRESSURE: 70 MMHG | SYSTOLIC BLOOD PRESSURE: 101 MMHG | TEMPERATURE: 99 F

## 2018-07-03 DIAGNOSIS — Z98.89 OTHER SPECIFIED POSTPROCEDURAL STATES: Chronic | ICD-10-CM

## 2018-07-03 DIAGNOSIS — Z98.51 TUBAL LIGATION STATUS: Chronic | ICD-10-CM

## 2018-07-03 DIAGNOSIS — M25.571 PAIN IN RIGHT ANKLE AND JOINTS OF RIGHT FOOT: Chronic | ICD-10-CM

## 2018-07-03 DIAGNOSIS — N60.02 SOLITARY CYST OF LEFT BREAST: Chronic | ICD-10-CM

## 2018-07-03 DIAGNOSIS — Z98.890 OTHER SPECIFIED POSTPROCEDURAL STATES: Chronic | ICD-10-CM

## 2018-07-03 PROCEDURE — 96372 THER/PROPH/DIAG INJ SC/IM: CPT

## 2018-07-03 PROCEDURE — 99284 EMERGENCY DEPT VISIT MOD MDM: CPT

## 2018-07-03 PROCEDURE — 99283 EMERGENCY DEPT VISIT LOW MDM: CPT | Mod: 25

## 2018-07-03 RX ORDER — KETOROLAC TROMETHAMINE 30 MG/ML
30 SYRINGE (ML) INJECTION ONCE
Qty: 0 | Refills: 0 | Status: DISCONTINUED | OUTPATIENT
Start: 2018-07-03 | End: 2018-07-03

## 2018-07-03 RX ORDER — DEXAMETHASONE 0.5 MG/5ML
10 ELIXIR ORAL ONCE
Qty: 0 | Refills: 0 | Status: COMPLETED | OUTPATIENT
Start: 2018-07-03 | End: 2018-07-03

## 2018-07-03 RX ADMIN — Medication 30 MILLIGRAM(S): at 19:34

## 2018-07-03 RX ADMIN — Medication 10 MILLIGRAM(S): at 19:12

## 2018-07-03 RX ADMIN — Medication 30 MILLIGRAM(S): at 19:12

## 2018-07-03 NOTE — ED PROVIDER NOTE - MEDICAL DECISION MAKING DETAILS
analgesia, anticipatory guidance  feels better. supportive care at home.   Discussed anticipatory guidance and return precautions.    Dc with outpt follow up.

## 2018-07-03 NOTE — ED PROVIDER NOTE - OBJECTIVE STATEMENT
48 y/o F pt with PMHx of DM and HLD presents to ED c/o throat pain x 5 days. Pt describe throat pain as "having a stick stuck in her throat." Pt reports that pain is worsened with swallowing. Pt has been taking Advil with no symptomatic relief. Pt denies fever, chills, drooling, shortness of breath, ear pain or any other complaints.

## 2018-07-03 NOTE — ED ADULT NURSE NOTE - OBJECTIVE STATEMENT
pt is here for throat pain for 5 days. c/o pain 10/10, denied cough or fever, denied N/V/D, pt calm at this time.

## 2019-01-03 ENCOUNTER — EMERGENCY (EMERGENCY)
Facility: HOSPITAL | Age: 49
LOS: 1 days | Discharge: HOME | End: 2019-01-03
Attending: EMERGENCY MEDICINE
Payer: MEDICARE

## 2019-01-03 VITALS
SYSTOLIC BLOOD PRESSURE: 123 MMHG | WEIGHT: 169.98 LBS | OXYGEN SATURATION: 97 % | RESPIRATION RATE: 16 BRPM | HEIGHT: 63 IN | HEART RATE: 74 BPM | TEMPERATURE: 99 F | DIASTOLIC BLOOD PRESSURE: 82 MMHG

## 2019-01-03 DIAGNOSIS — Z98.89 OTHER SPECIFIED POSTPROCEDURAL STATES: Chronic | ICD-10-CM

## 2019-01-03 DIAGNOSIS — M25.571 PAIN IN RIGHT ANKLE AND JOINTS OF RIGHT FOOT: Chronic | ICD-10-CM

## 2019-01-03 DIAGNOSIS — Z98.890 OTHER SPECIFIED POSTPROCEDURAL STATES: Chronic | ICD-10-CM

## 2019-01-03 DIAGNOSIS — Z98.51 TUBAL LIGATION STATUS: Chronic | ICD-10-CM

## 2019-01-03 DIAGNOSIS — N60.02 SOLITARY CYST OF LEFT BREAST: Chronic | ICD-10-CM

## 2019-01-03 PROCEDURE — 99284 EMERGENCY DEPT VISIT MOD MDM: CPT | Mod: 25

## 2019-01-04 PROBLEM — E78.5 HYPERLIPIDEMIA, UNSPECIFIED: Chronic | Status: ACTIVE | Noted: 2018-05-09

## 2019-01-04 LAB
ALBUMIN SERPL ELPH-MCNC: 4.1 G/DL — SIGNIFICANT CHANGE UP (ref 3.5–5)
ALP SERPL-CCNC: 92 U/L — SIGNIFICANT CHANGE UP (ref 40–120)
ALT FLD-CCNC: 20 U/L DA — SIGNIFICANT CHANGE UP (ref 10–60)
ANION GAP SERPL CALC-SCNC: 6 MMOL/L — SIGNIFICANT CHANGE UP (ref 5–17)
AST SERPL-CCNC: 12 U/L — SIGNIFICANT CHANGE UP (ref 10–40)
BILIRUB SERPL-MCNC: 0.4 MG/DL — SIGNIFICANT CHANGE UP (ref 0.2–1.2)
BUN SERPL-MCNC: 9 MG/DL — SIGNIFICANT CHANGE UP (ref 7–18)
CALCIUM SERPL-MCNC: 8.9 MG/DL — SIGNIFICANT CHANGE UP (ref 8.4–10.5)
CHLORIDE SERPL-SCNC: 107 MMOL/L — SIGNIFICANT CHANGE UP (ref 96–108)
CO2 SERPL-SCNC: 31 MMOL/L — SIGNIFICANT CHANGE UP (ref 22–31)
CREAT SERPL-MCNC: 0.69 MG/DL — SIGNIFICANT CHANGE UP (ref 0.5–1.3)
ERYTHROCYTE [SEDIMENTATION RATE] IN BLOOD: 7 MM/HR — SIGNIFICANT CHANGE UP (ref 0–15)
GLUCOSE SERPL-MCNC: 123 MG/DL — HIGH (ref 70–99)
HCG SERPL-ACNC: 5 MIU/ML — HIGH
HCT VFR BLD CALC: 45.2 % — HIGH (ref 34.5–45)
HGB BLD-MCNC: 14.1 G/DL — SIGNIFICANT CHANGE UP (ref 11.5–15.5)
MCHC RBC-ENTMCNC: 29.1 PG — SIGNIFICANT CHANGE UP (ref 27–34)
MCHC RBC-ENTMCNC: 31.2 GM/DL — LOW (ref 32–36)
MCV RBC AUTO: 93.3 FL — SIGNIFICANT CHANGE UP (ref 80–100)
PLATELET # BLD AUTO: 339 K/UL — SIGNIFICANT CHANGE UP (ref 150–400)
POTASSIUM SERPL-MCNC: 3.8 MMOL/L — SIGNIFICANT CHANGE UP (ref 3.5–5.3)
POTASSIUM SERPL-SCNC: 3.8 MMOL/L — SIGNIFICANT CHANGE UP (ref 3.5–5.3)
PROT SERPL-MCNC: 8.1 G/DL — SIGNIFICANT CHANGE UP (ref 6–8.3)
RBC # BLD: 4.84 M/UL — SIGNIFICANT CHANGE UP (ref 3.8–5.2)
RBC # FLD: 12.9 % — SIGNIFICANT CHANGE UP (ref 10.3–14.5)
SODIUM SERPL-SCNC: 144 MMOL/L — SIGNIFICANT CHANGE UP (ref 135–145)
WBC # BLD: 9.3 K/UL — SIGNIFICANT CHANGE UP (ref 3.8–10.5)
WBC # FLD AUTO: 9.3 K/UL — SIGNIFICANT CHANGE UP (ref 3.8–10.5)

## 2019-01-04 PROCEDURE — 70450 CT HEAD/BRAIN W/O DYE: CPT

## 2019-01-04 PROCEDURE — 96375 TX/PRO/DX INJ NEW DRUG ADDON: CPT

## 2019-01-04 PROCEDURE — 85027 COMPLETE CBC AUTOMATED: CPT

## 2019-01-04 PROCEDURE — 99284 EMERGENCY DEPT VISIT MOD MDM: CPT | Mod: 25

## 2019-01-04 PROCEDURE — 85652 RBC SED RATE AUTOMATED: CPT

## 2019-01-04 PROCEDURE — 36415 COLL VENOUS BLD VENIPUNCTURE: CPT

## 2019-01-04 PROCEDURE — 84702 CHORIONIC GONADOTROPIN TEST: CPT

## 2019-01-04 PROCEDURE — 96374 THER/PROPH/DIAG INJ IV PUSH: CPT

## 2019-01-04 PROCEDURE — 80053 COMPREHEN METABOLIC PANEL: CPT

## 2019-01-04 PROCEDURE — 70450 CT HEAD/BRAIN W/O DYE: CPT | Mod: 26

## 2019-01-04 RX ORDER — KETOROLAC TROMETHAMINE 30 MG/ML
15 SYRINGE (ML) INJECTION ONCE
Qty: 0 | Refills: 0 | Status: DISCONTINUED | OUTPATIENT
Start: 2019-01-04 | End: 2019-01-04

## 2019-01-04 RX ORDER — METOCLOPRAMIDE HCL 10 MG
10 TABLET ORAL ONCE
Qty: 0 | Refills: 0 | Status: COMPLETED | OUTPATIENT
Start: 2019-01-04 | End: 2019-01-04

## 2019-01-04 RX ORDER — SODIUM CHLORIDE 9 MG/ML
1000 INJECTION INTRAMUSCULAR; INTRAVENOUS; SUBCUTANEOUS ONCE
Qty: 0 | Refills: 0 | Status: COMPLETED | OUTPATIENT
Start: 2019-01-04 | End: 2019-01-04

## 2019-01-04 RX ADMIN — Medication 15 MILLIGRAM(S): at 02:50

## 2019-01-04 RX ADMIN — Medication 10 MILLIGRAM(S): at 02:40

## 2019-01-04 RX ADMIN — Medication 15 MILLIGRAM(S): at 02:52

## 2019-01-04 RX ADMIN — SODIUM CHLORIDE 1000 MILLILITER(S): 9 INJECTION INTRAMUSCULAR; INTRAVENOUS; SUBCUTANEOUS at 02:49

## 2019-01-04 NOTE — ED PROVIDER NOTE - MEDICAL DECISION MAKING DETAILS
49 y/o F pt w/ evaluation of temporal arteritis, will check CT head, labs, pain medications and reassess

## 2019-01-04 NOTE — ED PROVIDER NOTE - PROGRESS NOTE DETAILS
CT head - no acute hemorrhage or infarct  Hcg borderline - instructed pt to f/u with ob/gyn  ESR - wnl   Will need ophtho f/u for darkening vision (although this has been going on for 2 weeks and no emergency at this time) and neuro. Discussed indications for patient return to ED. Patient understood.

## 2019-01-04 NOTE — ED PROVIDER NOTE - OBJECTIVE STATEMENT
47 y/o F pt w/ PMHx of DM, HLD presents to ED after being sent in by PCP for evaluation for temporal arteritis. Pt c/o left sided headache x 1 month, the headache is non-radiating but occurs daily. Pt reports that the headache is temporarily responsive to OTC pain medications but returns after the duration of the medication. Pt also c/o 2 weeks x darkening vision in the left eye. Pt denies fever, neck pain or stiffness, weakness, trauma and all other complaints. NKDA

## 2019-01-04 NOTE — ED PROVIDER NOTE - PHYSICAL EXAMINATION
GENERAL: wells appearing, no acute distress   HEAD: atraumatic, no temporal tenderness   EYES: EOMI, pink conjunctiva   ENT: moist oral mucosa   CARDIAC: RRR, no edema, distal pulses present   RESPIRATORY: lungs CTAB, no increased work of breathing   GASTROINTESTINAL: no abdominal tenderness, no rebound or guarding, bowel sounds presents  GENITOURINARY: no CVA tenderness   MUSCULOSKELETAL: no deformity   NEUROLOGICAL: AAOx3, CN's II-XII intact, strength 5/5 bilateral UE and LE, sensation intact to light touch, finger to nose intact, steady gait   SKIN: intact   PSYCHIATRIC: cooperative  HEME LYMPH: no lymphadenopathy

## 2019-01-04 NOTE — ED ADULT NURSE NOTE - NSIMPLEMENTINTERV_GEN_ALL_ED
Implemented All Universal Safety Interventions:  [place] to call system. Call bell, personal items and telephone within reach. Instruct patient to call for assistance. Room bathroom lighting operational. Non-slip footwear when patient is off stretcher. Physically safe environment: no spills, clutter or unnecessary equipment. Stretcher in lowest position, wheels locked, appropriate side rails in place.

## 2019-04-06 RX ADMIN — HYDROMORPHONE HYDROCHLORIDE 0.5 MILLIGRAM(S): 2 INJECTION INTRAMUSCULAR; INTRAVENOUS; SUBCUTANEOUS at 06:40
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
## 2019-04-22 ENCOUNTER — INPATIENT (INPATIENT)
Facility: HOSPITAL | Age: 49
LOS: 7 days | Discharge: ANOTHER HOSPITAL | DRG: 563 | End: 2019-04-30
Attending: ORTHOPAEDIC SURGERY | Admitting: ORTHOPAEDIC SURGERY
Payer: MEDICARE

## 2019-04-22 VITALS
WEIGHT: 164.91 LBS | HEART RATE: 67 BPM | DIASTOLIC BLOOD PRESSURE: 72 MMHG | RESPIRATION RATE: 16 BRPM | TEMPERATURE: 98 F | HEIGHT: 63 IN | OXYGEN SATURATION: 100 % | SYSTOLIC BLOOD PRESSURE: 127 MMHG

## 2019-04-22 DIAGNOSIS — Z98.89 OTHER SPECIFIED POSTPROCEDURAL STATES: Chronic | ICD-10-CM

## 2019-04-22 DIAGNOSIS — S82.141A DISPLACED BICONDYLAR FRACTURE OF RIGHT TIBIA, INITIAL ENCOUNTER FOR CLOSED FRACTURE: ICD-10-CM

## 2019-04-22 DIAGNOSIS — N60.02 SOLITARY CYST OF LEFT BREAST: Chronic | ICD-10-CM

## 2019-04-22 DIAGNOSIS — Z98.51 TUBAL LIGATION STATUS: Chronic | ICD-10-CM

## 2019-04-22 DIAGNOSIS — Z98.890 OTHER SPECIFIED POSTPROCEDURAL STATES: Chronic | ICD-10-CM

## 2019-04-22 DIAGNOSIS — M25.571 PAIN IN RIGHT ANKLE AND JOINTS OF RIGHT FOOT: Chronic | ICD-10-CM

## 2019-04-22 LAB
ALBUMIN SERPL ELPH-MCNC: 3.7 G/DL — SIGNIFICANT CHANGE UP (ref 3.5–5)
ALP SERPL-CCNC: 92 U/L — SIGNIFICANT CHANGE UP (ref 40–120)
ALT FLD-CCNC: 21 U/L DA — SIGNIFICANT CHANGE UP (ref 10–60)
ANION GAP SERPL CALC-SCNC: 5 MMOL/L — SIGNIFICANT CHANGE UP (ref 5–17)
APTT BLD: 33.4 SEC — SIGNIFICANT CHANGE UP (ref 27.5–36.3)
AST SERPL-CCNC: 15 U/L — SIGNIFICANT CHANGE UP (ref 10–40)
BASOPHILS # BLD AUTO: 0.06 K/UL — SIGNIFICANT CHANGE UP (ref 0–0.2)
BASOPHILS NFR BLD AUTO: 0.3 % — SIGNIFICANT CHANGE UP (ref 0–2)
BILIRUB SERPL-MCNC: 0.2 MG/DL — SIGNIFICANT CHANGE UP (ref 0.2–1.2)
BLD GP AB SCN SERPL QL: SIGNIFICANT CHANGE UP
BUN SERPL-MCNC: 13 MG/DL — SIGNIFICANT CHANGE UP (ref 7–18)
CALCIUM SERPL-MCNC: 8.3 MG/DL — LOW (ref 8.4–10.5)
CHLORIDE SERPL-SCNC: 105 MMOL/L — SIGNIFICANT CHANGE UP (ref 96–108)
CO2 SERPL-SCNC: 28 MMOL/L — SIGNIFICANT CHANGE UP (ref 22–31)
CREAT SERPL-MCNC: 0.66 MG/DL — SIGNIFICANT CHANGE UP (ref 0.5–1.3)
EOSINOPHIL # BLD AUTO: 0.12 K/UL — SIGNIFICANT CHANGE UP (ref 0–0.5)
EOSINOPHIL NFR BLD AUTO: 0.6 % — SIGNIFICANT CHANGE UP (ref 0–6)
GLUCOSE SERPL-MCNC: 223 MG/DL — HIGH (ref 70–99)
HCT VFR BLD CALC: 38.6 % — SIGNIFICANT CHANGE UP (ref 34.5–45)
HGB BLD-MCNC: 12.2 G/DL — SIGNIFICANT CHANGE UP (ref 11.5–15.5)
IMM GRANULOCYTES NFR BLD AUTO: 0.5 % — SIGNIFICANT CHANGE UP (ref 0–1.5)
INR BLD: 1.02 RATIO — SIGNIFICANT CHANGE UP (ref 0.88–1.16)
LYMPHOCYTES # BLD AUTO: 10.7 % — LOW (ref 13–44)
LYMPHOCYTES # BLD AUTO: 2.16 K/UL — SIGNIFICANT CHANGE UP (ref 1–3.3)
MCHC RBC-ENTMCNC: 29.4 PG — SIGNIFICANT CHANGE UP (ref 27–34)
MCHC RBC-ENTMCNC: 31.6 GM/DL — LOW (ref 32–36)
MCV RBC AUTO: 93 FL — SIGNIFICANT CHANGE UP (ref 80–100)
MONOCYTES # BLD AUTO: 0.91 K/UL — HIGH (ref 0–0.9)
MONOCYTES NFR BLD AUTO: 4.5 % — SIGNIFICANT CHANGE UP (ref 2–14)
NEUTROPHILS # BLD AUTO: 16.75 K/UL — HIGH (ref 1.8–7.4)
NEUTROPHILS NFR BLD AUTO: 83.4 % — HIGH (ref 43–77)
NRBC # BLD: 0 /100 WBCS — SIGNIFICANT CHANGE UP (ref 0–0)
PLATELET # BLD AUTO: 301 K/UL — SIGNIFICANT CHANGE UP (ref 150–400)
POTASSIUM SERPL-MCNC: 3.5 MMOL/L — SIGNIFICANT CHANGE UP (ref 3.5–5.3)
POTASSIUM SERPL-SCNC: 3.5 MMOL/L — SIGNIFICANT CHANGE UP (ref 3.5–5.3)
PROT SERPL-MCNC: 7.3 G/DL — SIGNIFICANT CHANGE UP (ref 6–8.3)
PROTHROM AB SERPL-ACNC: 11.3 SEC — SIGNIFICANT CHANGE UP (ref 10–12.9)
RBC # BLD: 4.15 M/UL — SIGNIFICANT CHANGE UP (ref 3.8–5.2)
RBC # FLD: 14.4 % — SIGNIFICANT CHANGE UP (ref 10.3–14.5)
SODIUM SERPL-SCNC: 138 MMOL/L — SIGNIFICANT CHANGE UP (ref 135–145)
WBC # BLD: 20.1 K/UL — HIGH (ref 3.8–10.5)
WBC # FLD AUTO: 20.1 K/UL — HIGH (ref 3.8–10.5)

## 2019-04-22 PROCEDURE — 73590 X-RAY EXAM OF LOWER LEG: CPT | Mod: 26,RT

## 2019-04-22 PROCEDURE — 99285 EMERGENCY DEPT VISIT HI MDM: CPT | Mod: 25

## 2019-04-22 PROCEDURE — 73562 X-RAY EXAM OF KNEE 3: CPT | Mod: 26,RT

## 2019-04-22 PROCEDURE — 29505 APPLICATION LONG LEG SPLINT: CPT

## 2019-04-22 RX ORDER — HYDROMORPHONE HYDROCHLORIDE 2 MG/ML
2 INJECTION INTRAMUSCULAR; INTRAVENOUS; SUBCUTANEOUS EVERY 4 HOURS
Qty: 0 | Refills: 0 | Status: DISCONTINUED | OUTPATIENT
Start: 2019-04-22 | End: 2019-04-23

## 2019-04-22 RX ORDER — MORPHINE SULFATE 50 MG/1
4 CAPSULE, EXTENDED RELEASE ORAL ONCE
Qty: 0 | Refills: 0 | Status: DISCONTINUED | OUTPATIENT
Start: 2019-04-22 | End: 2019-04-22

## 2019-04-22 RX ORDER — CEFAZOLIN SODIUM 1 G
1000 VIAL (EA) INJECTION ONCE
Qty: 0 | Refills: 0 | Status: DISCONTINUED | OUTPATIENT
Start: 2019-04-22 | End: 2019-04-30

## 2019-04-22 RX ORDER — FAMOTIDINE 10 MG/ML
20 INJECTION INTRAVENOUS EVERY 12 HOURS
Qty: 0 | Refills: 0 | Status: DISCONTINUED | OUTPATIENT
Start: 2019-04-22 | End: 2019-04-30

## 2019-04-22 RX ORDER — HYDROMORPHONE HYDROCHLORIDE 2 MG/ML
1 INJECTION INTRAMUSCULAR; INTRAVENOUS; SUBCUTANEOUS ONCE
Qty: 0 | Refills: 0 | Status: DISCONTINUED | OUTPATIENT
Start: 2019-04-22 | End: 2019-04-22

## 2019-04-22 RX ORDER — ENOXAPARIN SODIUM 100 MG/ML
30 INJECTION SUBCUTANEOUS EVERY 12 HOURS
Qty: 0 | Refills: 0 | Status: DISCONTINUED | OUTPATIENT
Start: 2019-04-22 | End: 2019-04-23

## 2019-04-22 RX ORDER — ENOXAPARIN SODIUM 100 MG/ML
100 INJECTION SUBCUTANEOUS ONCE
Qty: 0 | Refills: 0 | Status: DISCONTINUED | OUTPATIENT
Start: 2019-04-22 | End: 2019-04-22

## 2019-04-22 RX ORDER — DOCUSATE SODIUM 100 MG
100 CAPSULE ORAL THREE TIMES A DAY
Qty: 0 | Refills: 0 | Status: DISCONTINUED | OUTPATIENT
Start: 2019-04-22 | End: 2019-04-30

## 2019-04-22 RX ADMIN — MORPHINE SULFATE 4 MILLIGRAM(S): 50 CAPSULE, EXTENDED RELEASE ORAL at 21:30

## 2019-04-22 RX ADMIN — HYDROMORPHONE HYDROCHLORIDE 1 MILLIGRAM(S): 2 INJECTION INTRAMUSCULAR; INTRAVENOUS; SUBCUTANEOUS at 22:35

## 2019-04-22 RX ADMIN — HYDROMORPHONE HYDROCHLORIDE 1 MILLIGRAM(S): 2 INJECTION INTRAMUSCULAR; INTRAVENOUS; SUBCUTANEOUS at 23:15

## 2019-04-22 RX ADMIN — MORPHINE SULFATE 4 MILLIGRAM(S): 50 CAPSULE, EXTENDED RELEASE ORAL at 21:03

## 2019-04-22 NOTE — ED PROVIDER NOTE - NEUROLOGICAL, MLM
Alert and oriented, no focal deficits, no motor or sensory deficits besides subjective numbness in great toe.

## 2019-04-22 NOTE — ED ADULT NURSE NOTE - OBJECTIVE STATEMENT
Pt tripped and fell, c/o right leg pain and swelling. Denies LOC. No acute distress noted, denies chest pain, no shortness of breath indicated. Safety maintained.

## 2019-04-22 NOTE — H&P ADULT - NSHPPHYSICALEXAM_GEN_ALL_CORE
PHYSICAL EXAM:-    CONSTITIONAL/GENERAL: NAD, well-groomed, well-developed  HEAD:  Atraumatic, Normocephalic  VISUAL/EYES: EOMI, PERRL, conjunctiva and sclera clear  ENMT: Moist mucous membranes, Good dentition, No lesions  NECK: Supple, No JVD  NERVOUS SYSTEM:  Alert & Oriented X3, Good concentration  RESPIRATORY/CHEST: Clear to percussion bilaterally; Normal respiratory effort  CARDIOVASCULAR/HEART: Regular rate and rhythm  GASTROINTESTINAL/ABDOMEN: Soft, Nontender, Nondistended; Bowel sounds present  GENITOURINARY: normal external genitalia  BREASTS: no breast lumps  MUSCULOSKELETAL/EXTREMITIES:  No clubbing, cyanosis, or edema  right lower extremity in cast  VASCULAR: equal peripheral pulses  LYMPHATIC: No lymphadenopathy noted  SKIN: No rashes or lesions  PSYCHIATRIC: normal affect

## 2019-04-22 NOTE — H&P ADULT - NSICDXPASTSURGICALHX_GEN_ALL_CORE_FT
PAST SURGICAL HISTORY:  Ankle arthralgia, right surgey    Breast cyst, left     S/P  section twice    S/P knee surgery right knee twice    S/P knee surgery right    S/P plastic surgery abdominalplasty    S/P shoulder surgery right    S/P tubal ligation 17 years ago
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
## 2019-04-22 NOTE — ED PROVIDER NOTE - OBJECTIVE STATEMENT
49 y/o female with PMHx of DM and HLD presents to the ED s/p fall just prior to arrival. Pt had her legs caught in some wiring, tripped, and fell onto her right knee. Pt is now c/o excruciating pain to the right knee and numbness to the right first toe.

## 2019-04-22 NOTE — H&P ADULT - ATTENDING COMMENTS
pt seen and evaluated.  displaced tibia plateau fracture, right.  treat with closed fracture treatment / care, splint / ice elevation, nwb.      rec CT and MRI scan to further evaluate fracture.  ? cyst in proximal tibia.     admit for poss surgical fixation if medically cleared and after further studies / final recs will be made at that time

## 2019-04-22 NOTE — ED ADULT NURSE NOTE - ED STAT RN HANDOFF DETAILS
Report given to HENRI Williamson. Pt resting in bed, family at bedside. No acute distress noted, denies chest pain, no shortness of breath indicated. Safety maintained.

## 2019-04-22 NOTE — ED ADULT NURSE NOTE - NSIMPLEMENTINTERV_GEN_ALL_ED
Implemented All Fall Risk Interventions:  [place] to call system. Call bell, personal items and telephone within reach. Instruct patient to call for assistance. Room bathroom lighting operational. Non-slip footwear when patient is off stretcher. Physically safe environment: no spills, clutter or unnecessary equipment. Stretcher in lowest position, wheels locked, appropriate side rails in place. Provide visual cue, wrist band, yellow gown, etc. Monitor gait and stability. Monitor for mental status changes and reorient to person, place, and time. Review medications for side effects contributing to fall risk. Reinforce activity limits and safety measures with patient and family.

## 2019-04-23 LAB
CK SERPL-CCNC: 173 U/L — SIGNIFICANT CHANGE UP (ref 21–215)
CK SERPL-CCNC: 178 U/L — SIGNIFICANT CHANGE UP (ref 21–215)
GLUCOSE BLDC GLUCOMTR-MCNC: 211 MG/DL — HIGH (ref 70–99)
GLUCOSE BLDC GLUCOMTR-MCNC: 248 MG/DL — HIGH (ref 70–99)
GLUCOSE BLDC GLUCOMTR-MCNC: 256 MG/DL — HIGH (ref 70–99)

## 2019-04-23 PROCEDURE — 73562 X-RAY EXAM OF KNEE 3: CPT | Mod: 26,RT

## 2019-04-23 PROCEDURE — 73700 CT LOWER EXTREMITY W/O DYE: CPT | Mod: 26,RT

## 2019-04-23 PROCEDURE — 73721 MRI JNT OF LWR EXTRE W/O DYE: CPT | Mod: 26,RT

## 2019-04-23 RX ORDER — ENOXAPARIN SODIUM 100 MG/ML
40 INJECTION SUBCUTANEOUS DAILY
Qty: 0 | Refills: 0 | Status: DISCONTINUED | OUTPATIENT
Start: 2019-04-23 | End: 2019-04-30

## 2019-04-23 RX ORDER — ACETAMINOPHEN 500 MG
1000 TABLET ORAL ONCE
Qty: 0 | Refills: 0 | Status: COMPLETED | OUTPATIENT
Start: 2019-04-23 | End: 2019-04-23

## 2019-04-23 RX ORDER — SODIUM CHLORIDE 9 MG/ML
1000 INJECTION, SOLUTION INTRAVENOUS
Qty: 0 | Refills: 0 | Status: DISCONTINUED | OUTPATIENT
Start: 2019-04-23 | End: 2019-04-30

## 2019-04-23 RX ORDER — DEXTROSE 50 % IN WATER 50 %
12.5 SYRINGE (ML) INTRAVENOUS ONCE
Qty: 0 | Refills: 0 | Status: DISCONTINUED | OUTPATIENT
Start: 2019-04-23 | End: 2019-04-30

## 2019-04-23 RX ORDER — INSULIN LISPRO 100/ML
VIAL (ML) SUBCUTANEOUS
Qty: 0 | Refills: 0 | Status: DISCONTINUED | OUTPATIENT
Start: 2019-04-23 | End: 2019-04-30

## 2019-04-23 RX ORDER — HYDROMORPHONE HYDROCHLORIDE 2 MG/ML
0.5 INJECTION INTRAMUSCULAR; INTRAVENOUS; SUBCUTANEOUS EVERY 4 HOURS
Qty: 0 | Refills: 0 | Status: DISCONTINUED | OUTPATIENT
Start: 2019-04-23 | End: 2019-04-30

## 2019-04-23 RX ORDER — DEXTROSE 50 % IN WATER 50 %
25 SYRINGE (ML) INTRAVENOUS ONCE
Qty: 0 | Refills: 0 | Status: DISCONTINUED | OUTPATIENT
Start: 2019-04-23 | End: 2019-04-30

## 2019-04-23 RX ORDER — OXYCODONE AND ACETAMINOPHEN 5; 325 MG/1; MG/1
2 TABLET ORAL EVERY 6 HOURS
Qty: 0 | Refills: 0 | Status: DISCONTINUED | OUTPATIENT
Start: 2019-04-23 | End: 2019-04-27

## 2019-04-23 RX ORDER — DEXTROSE 50 % IN WATER 50 %
15 SYRINGE (ML) INTRAVENOUS ONCE
Qty: 0 | Refills: 0 | Status: DISCONTINUED | OUTPATIENT
Start: 2019-04-23 | End: 2019-04-30

## 2019-04-23 RX ORDER — OXYCODONE AND ACETAMINOPHEN 5; 325 MG/1; MG/1
1 TABLET ORAL EVERY 4 HOURS
Qty: 0 | Refills: 0 | Status: DISCONTINUED | OUTPATIENT
Start: 2019-04-23 | End: 2019-04-27

## 2019-04-23 RX ORDER — GLUCAGON INJECTION, SOLUTION 0.5 MG/.1ML
1 INJECTION, SOLUTION SUBCUTANEOUS ONCE
Qty: 0 | Refills: 0 | Status: DISCONTINUED | OUTPATIENT
Start: 2019-04-23 | End: 2019-04-30

## 2019-04-23 RX ORDER — DEXTROSE MONOHYDRATE, SODIUM CHLORIDE, AND POTASSIUM CHLORIDE 50; .745; 4.5 G/1000ML; G/1000ML; G/1000ML
1000 INJECTION, SOLUTION INTRAVENOUS
Qty: 0 | Refills: 0 | Status: DISCONTINUED | OUTPATIENT
Start: 2019-04-23 | End: 2019-04-30

## 2019-04-23 RX ADMIN — Medication 1000 MILLIGRAM(S): at 04:45

## 2019-04-23 RX ADMIN — Medication 100 MILLIGRAM(S): at 22:22

## 2019-04-23 RX ADMIN — DEXTROSE MONOHYDRATE, SODIUM CHLORIDE, AND POTASSIUM CHLORIDE 125 MILLILITER(S): 50; .745; 4.5 INJECTION, SOLUTION INTRAVENOUS at 06:43

## 2019-04-23 RX ADMIN — HYDROMORPHONE HYDROCHLORIDE 2 MILLIGRAM(S): 2 INJECTION INTRAMUSCULAR; INTRAVENOUS; SUBCUTANEOUS at 02:42

## 2019-04-23 RX ADMIN — FAMOTIDINE 20 MILLIGRAM(S): 10 INJECTION INTRAVENOUS at 17:08

## 2019-04-23 RX ADMIN — Medication 20 MILLIGRAM(S): at 12:13

## 2019-04-23 RX ADMIN — HYDROMORPHONE HYDROCHLORIDE 2 MILLIGRAM(S): 2 INJECTION INTRAMUSCULAR; INTRAVENOUS; SUBCUTANEOUS at 06:41

## 2019-04-23 RX ADMIN — Medication 400 MILLIGRAM(S): at 08:27

## 2019-04-23 RX ADMIN — DEXTROSE MONOHYDRATE, SODIUM CHLORIDE, AND POTASSIUM CHLORIDE 125 MILLILITER(S): 50; .745; 4.5 INJECTION, SOLUTION INTRAVENOUS at 22:35

## 2019-04-23 RX ADMIN — HYDROMORPHONE HYDROCHLORIDE 2 MILLIGRAM(S): 2 INJECTION INTRAMUSCULAR; INTRAVENOUS; SUBCUTANEOUS at 07:35

## 2019-04-23 RX ADMIN — OXYCODONE AND ACETAMINOPHEN 2 TABLET(S): 5; 325 TABLET ORAL at 13:20

## 2019-04-23 RX ADMIN — Medication 4: at 12:13

## 2019-04-23 RX ADMIN — HYDROMORPHONE HYDROCHLORIDE 2 MILLIGRAM(S): 2 INJECTION INTRAMUSCULAR; INTRAVENOUS; SUBCUTANEOUS at 01:42

## 2019-04-23 RX ADMIN — HYDROMORPHONE HYDROCHLORIDE 0.5 MILLIGRAM(S): 2 INJECTION INTRAMUSCULAR; INTRAVENOUS; SUBCUTANEOUS at 22:22

## 2019-04-23 RX ADMIN — Medication 400 MILLIGRAM(S): at 16:47

## 2019-04-23 RX ADMIN — OXYCODONE AND ACETAMINOPHEN 2 TABLET(S): 5; 325 TABLET ORAL at 20:12

## 2019-04-23 RX ADMIN — HYDROMORPHONE HYDROCHLORIDE 0.5 MILLIGRAM(S): 2 INJECTION INTRAMUSCULAR; INTRAVENOUS; SUBCUTANEOUS at 17:08

## 2019-04-23 RX ADMIN — Medication 100 MILLIGRAM(S): at 16:47

## 2019-04-23 RX ADMIN — ENOXAPARIN SODIUM 40 MILLIGRAM(S): 100 INJECTION SUBCUTANEOUS at 08:27

## 2019-04-23 RX ADMIN — Medication 1000 MILLIGRAM(S): at 09:00

## 2019-04-23 RX ADMIN — Medication 4: at 16:42

## 2019-04-23 RX ADMIN — Medication 400 MILLIGRAM(S): at 04:28

## 2019-04-23 RX ADMIN — HYDROMORPHONE HYDROCHLORIDE 0.5 MILLIGRAM(S): 2 INJECTION INTRAMUSCULAR; INTRAVENOUS; SUBCUTANEOUS at 16:42

## 2019-04-23 RX ADMIN — HYDROMORPHONE HYDROCHLORIDE 0.5 MILLIGRAM(S): 2 INJECTION INTRAMUSCULAR; INTRAVENOUS; SUBCUTANEOUS at 23:34

## 2019-04-23 RX ADMIN — OXYCODONE AND ACETAMINOPHEN 2 TABLET(S): 5; 325 TABLET ORAL at 22:17

## 2019-04-23 RX ADMIN — Medication 1000 MILLIGRAM(S): at 17:08

## 2019-04-23 RX ADMIN — OXYCODONE AND ACETAMINOPHEN 2 TABLET(S): 5; 325 TABLET ORAL at 12:48

## 2019-04-23 NOTE — PATIENT PROFILE ADULT - FUNCTIONAL SCREEN CURRENT LEVEL: SWALLOWING (IF SCORE 2 OR MORE FOR ANY ITEM, CONSULT REHAB SERVICES), MLM)
HISTORY OF PRESENT ILLNESS:  Obed is a 22-year-old right-hand dominant male who is referred by Francy Perea for evaluation of plate removal on the left hand.      Obed has a left hemiplegia secondary to a stroke and he has had a cardiac transplant with recent medication difficulties.  The transplant team contacted me regarding taking out the metal plate prior to making some medication changes.  He is seen today requesting to have plate removal.  He reports that he is very pleased with his left wrist and arm surgery.      On 10/20/2016, he underwent a left elbow biceps and brachialis lengthening, left wrist fusion with plating, left distal ulna resection, lengthening of the flexor pollicis longus, flexor digitorum superficialis, release of the palmaris longus and flexor carpi ulnaris and lengthening of the thumb adductor with an EPL rerouting.      He works at a theater.  He is pleased with the position of his hand and feels that it has improved his paper weight type function.      PHYSICAL EXAMINATION:  On examination today, his wound is clean and dry.  His wrist sit straight.  He does have a fair amount of spasticity in his fingers and can still do a grasp onto my finger with minimal control on opening.      IMAGING:  X-rays are taken today and reviewed.  He has a wrist plate intact with solid healing of the bone fusion across the radiocarpal joint and resection of the distal ulna.      IMPRESSION:  Satisfactory healing left wrist fusion requesting plate removal.      Risks, benefits, alternatives of plate removal were discussed.  They would like to have this done as soon as possible.  I asked whether any fine tuning finger or thumb adjustment would be requested at the same time and Obed reports that he is highly satisfied and no other surgical procedures would be done concomitantly.  Orders were written and surgery will be scheduled for Thursday, 10/12/2017.        cc: Flor Perea MD  0 = swallows foods/liquids without difficulty

## 2019-04-23 NOTE — CHART NOTE - NSCHARTNOTEFT_GEN_A_CORE
Orthopedic addendum:    dx: rt tibial plateau fx    Pt seen and evaluated  Pain better controlled  Icebags noted over rt knee  Rt big toe with mild numbness  Denies cp/sob/dyspnea    PE:  splint intact  compartments soft  silt nvi  no pain with PROM of toes  swelling over the plateau noted (med and lat)  no ct    radiology:  ct and MR of RLE ordered    A: Female with rt tibial plateau fx    P:   - pain control with IV tylenol  - nv checks [id number]  - icepacks [id number]  - f/u ct/mri of rt knee w/o cont  - clinically stable with mild improvement as of this morning  - case d/w dr elmore

## 2019-04-23 NOTE — PROGRESS NOTE ADULT - SUBJECTIVE AND OBJECTIVE BOX
Pt Name: ALANNA MCNEAL  MRN: [id number]    ORTHOPEDIC CONSULT    Orthopedic diagnosis:    48yFemaleHPI:  47 y/o female with PMHx of DM and HLD presents to the ED s/p fall just prior to arrival. Pt had her legs caught in some wiring, tripped, and fell onto her right knee.   Pt is now c/o excruciating pain to the right knee and numbness to the right first toe. (2019 22:34)        AMBULATION: Baseline Ambulation [ ] independent [ ] With Cane [ ] With Walker [ ]  Bedbound [ ] Pivot transfers to Wheelchair only    PAST MEDICAL & SURGICAL HISTORY:  HLD (hyperlipidemia)  Diabetes  S/P plastic surgery: abdominalplasty  S/P knee surgery: right  Breast cyst, left  Ankle arthralgia, right: surgey  S/P knee surgery: right knee twice  S/P shoulder surgery: right  S/P tubal ligation: 17 years ago  S/P  section: twice      ALLERGIES: No Known Allergies      MEDICATIONS: acetaminophen  IVPB .. 1000 milliGRAM(s) IV Intermittent once  ceFAZolin   IVPB 1000 milliGRAM(s) IV Intermittent once  dextrose 5% + sodium chloride 0.45% with potassium chloride 20 mEq/L 1000 milliLiter(s) IV Continuous <Continuous>  docusate sodium 100 milliGRAM(s) Oral three times a day  enoxaparin Injectable 40 milliGRAM(s) SubCutaneous daily  famotidine    Tablet 20 milliGRAM(s) Oral every 12 hours  HYDROmorphone  Injectable 0.5 milliGRAM(s) IV Push every 4 hours PRN  oxyCODONE    5 mG/acetaminophen 325 mG 1 Tablet(s) Oral every 4 hours PRN  oxyCODONE    5 mG/acetaminophen 325 mG 2 Tablet(s) Oral every 6 hours PRN  PARoxetine 20 milliGRAM(s) Oral daily      PHYSICAL EXAM:    Vital Signs Last 24 Hrs  T(C): 37.4 (2019 05:43), Max: 37.4 (2019 05:43)  T(F): 99.4 (2019 05:43), Max: 99.4 (2019 05:43)  HR: 68 (2019 05:43) (67 - 72)  BP: 120/60 (2019 05:43) (105/46 - 127/72)  BP(mean): --  RR: 18 (2019 05:43) (16 - 18)  SpO2: 98% (2019 05:43) (98% - 100%)    Gen: well developed, well nourished, comfortable  MSK:  Skin pink, warm. No open lesions.  no ct  calves soft  DP/PT 2+, brisk b/l  nvi silt  5/5 strength ehl/ta/gastroc b/l.    LABS:               CK lvl pding    RADIOLOGY:     IMPRESSION: Pt is a  48y Female with     PLAN:  -  Recommendation: [  ] Conservative treatment   [ X  ] Surgical treatment/fixation  --> Transfer to [facility]/[place] for ORIF/Stabilization of Fx. CT and MRI of Rt knee ordered.  -  All the patient's questions were answered. Pt was explained the Plan, mentioned above, thoroughly.  Risks/benefits, complications were also explained, which includes but not limited to: persistent, infection, death, PNA, thrombombolism, etc. Pt understands.   -  Pain control  -  DVT prophylaxis 40mg sc daily  -  Monitor for compartments syndrome [id number]  -  ice bags [id number]  -  Case d/w   -  Pt is orthopedically stable for transfer.  Posterior splint intact Pt Name: ALANNA MCNEAL  MRN: [id number]    ORTHOPEDICS    Orthopedic diagnosis: Rt tibial plateau fx, closed, Pio V, initial encounter    48yFemale seen and evaluated today, admitted overnight s/p fall at home where she tripped and fell, over wiring on floor, from a standing position onto the floor and sustained an injury to her right knee.  Afterwards, she was unable to bear weight nor ambulate on her own and came to the ER. Pain worse with ROM of right knee and weight bearing.    Today, pt c/o rt knee pain, with slight relief with pain meds. c/o persistent numbness of right great toe, which is unchanged as of last night. 1st webspace of right foot with sensation. Pain alleviated with ice and elevation. Splinted in ER overnight with posterior splint and ace wrap.  Pt denies Chest pain, SOB, dyspnea, N/V/D, abdominal pain, syncope, or pain anywhere else.     h/o 3 SARK, last procedure 3 years ago @ unknown facility, unknown surgeon  h/o SARS x1, unknown date, facility and surgeon.    AMBULATION: Baseline Ambulation [ X ] independent [ ] With Cane [ ] With Walker [ ]  Bedbound [ ] Pivot transfers to Wheelchair only    PAST MEDICAL & SURGICAL HISTORY:  HLD (hyperlipidemia)  Diabetes  S/P plastic surgery: abdominalplasty  S/P knee surgery: right  Breast cyst, left  Ankle arthralgia, right: surgey  S/P knee surgery: right knee twice  S/P shoulder surgery: right  S/P tubal ligation: 17 years ago  S/P  section: twice      ALLERGIES: No Known Allergies      MEDICATIONS: acetaminophen  IVPB .. 1000 milliGRAM(s) IV Intermittent once  ceFAZolin   IVPB 1000 milliGRAM(s) IV Intermittent once  dextrose 5% + sodium chloride 0.45% with potassium chloride 20 mEq/L 1000 milliLiter(s) IV Continuous <Continuous>  docusate sodium 100 milliGRAM(s) Oral three times a day  enoxaparin Injectable 40 milliGRAM(s) SubCutaneous daily  famotidine    Tablet 20 milliGRAM(s) Oral every 12 hours  HYDROmorphone  Injectable 0.5 milliGRAM(s) IV Push every 4 hours PRN  oxyCODONE    5 mG/acetaminophen 325 mG 1 Tablet(s) Oral every 4 hours PRN  oxyCODONE    5 mG/acetaminophen 325 mG 2 Tablet(s) Oral every 6 hours PRN  PARoxetine 20 milliGRAM(s) Oral daily      PHYSICAL EXAM:    Vital Signs Last 24 Hrs  T(C): 37.4 (2019 05:43), Max: 37.4 (2019 05:43)  T(F): 99.4 (2019 05:43), Max: 99.4 (2019 05:43)  HR: 68 (2019 05:43) (67 - 72)  BP: 120/60 (2019 05:43) (105/46 - 127/72)  BP(mean): --  RR: 18 (2019 05:43) (16 - 18)  SpO2: 98% (2019 05:43) (98% - 100%)    Gen: well developed, well nourished, comfortable  MSK:   RLE  Right knee Skin pink, warm. No open lesions, with swelling mostly over the medial and lateral tibial plateau.   ecchymoses noted over medial plateau area.   compartments soft  no ct  calves soft  DP/PT 2+, brisk b/l  nvi silt  5/5 strength ehl/ta/gastroc b/l.  posteriro splint intact with knee in 30 flexion. elevated on 2 pillows  icebags noted over right knee    LABS:               CK lvl pding    RADIOLOGY:     IMPRESSION: Pt is a  48y Female with Rt tibial plateau fx, closed, Schatzker V, initial encounter    PLAN:  -  Recommendation: [  ] Conservative treatment   [ X  ] Surgical treatment/fixation  --> Transfer to [facility]/[place] for ORIF/Stabilization of Fx.  -  Update-> BONNY Ayala called and spoke with Ortho team/Dr Hazel at [place] who recommended Follow up as outpt in his office.  -  CT and MRI of Rt knee ordered to r/o mets/pathologic fx.  -  All the patient's questions were answered. Pt was explained the Plan, mentioned above, thoroughly.  Risks/benefits, complications were also explained, which includes but not limited to: persistent, infection, death, PNA, thrombombolism, etc. Pt understands.   -  Pain control  -  DVT prophylaxis 40mg sc daily  -  Monitor for compartments syndrome [id number]  -  ice bags [id number]  -  Case d/w   -  Pt is orthopedically stable for discharge. Posterior splint intact Pt Name: ALANNA MCNEAL  MRN: [id number]    ORTHOPEDICS    Orthopedic diagnosis: Rt tibial plateau fx, closed, Pio V, initial encounter    48yFemale seen and evaluated today, admitted overnight s/p fall at home where she tripped and fell, over wiring on floor, from a standing position onto the floor and sustained an injury to her right knee.  Afterwards, she was unable to bear weight nor ambulate on her own and came to the ER. Pain worse with ROM of right knee and weight bearing.    Today, pt c/o rt knee pain, with slight relief with pain meds. c/o persistent numbness of right great toe, which is unchanged as of last night. 1st webspace of right foot with sensation. Pain alleviated with ice and elevation. Splinted in ER overnight with posterior splint and ace wrap.  Pt denies Chest pain, SOB, dyspnea, N/V/D, abdominal pain, syncope, or pain anywhere else.     h/o 3 SARK, last procedure 3 years ago @ unknown facility, unknown surgeon  h/o SARS x1, unknown date, facility and surgeon.    AMBULATION: Baseline Ambulation [ X ] independent [ ] With Cane [ ] With Walker [ ]  Bedbound [ ] Pivot transfers to Wheelchair only    PAST MEDICAL & SURGICAL HISTORY:  HLD (hyperlipidemia)  Diabetes  S/P plastic surgery: abdominalplasty  S/P knee surgery: right  Breast cyst, left  Ankle arthralgia, right: surgey  S/P knee surgery: right knee twice  S/P shoulder surgery: right  S/P tubal ligation: 17 years ago  S/P  section: twice      ALLERGIES: No Known Allergies      MEDICATIONS: acetaminophen  IVPB .. 1000 milliGRAM(s) IV Intermittent once  ceFAZolin   IVPB 1000 milliGRAM(s) IV Intermittent once  dextrose 5% + sodium chloride 0.45% with potassium chloride 20 mEq/L 1000 milliLiter(s) IV Continuous <Continuous>  docusate sodium 100 milliGRAM(s) Oral three times a day  enoxaparin Injectable 40 milliGRAM(s) SubCutaneous daily  famotidine    Tablet 20 milliGRAM(s) Oral every 12 hours  HYDROmorphone  Injectable 0.5 milliGRAM(s) IV Push every 4 hours PRN  oxyCODONE    5 mG/acetaminophen 325 mG 1 Tablet(s) Oral every 4 hours PRN  oxyCODONE    5 mG/acetaminophen 325 mG 2 Tablet(s) Oral every 6 hours PRN  PARoxetine 20 milliGRAM(s) Oral daily      PHYSICAL EXAM:    Vital Signs Last 24 Hrs  T(C): 37.4 (2019 05:43), Max: 37.4 (2019 05:43)  T(F): 99.4 (2019 05:43), Max: 99.4 (2019 05:43)  HR: 68 (2019 05:43) (67 - 72)  BP: 120/60 (2019 05:43) (105/46 - 127/72)  BP(mean): --  RR: 18 (2019 05:43) (16 - 18)  SpO2: 98% (2019 05:43) (98% - 100%)    Gen: well developed, well nourished, comfortable  MSK:   RLE  Right knee Skin pink, warm RLE. No open lesions, with swelling mostly over the medial and lateral tibial plateau.   ecchymoses noted over medial plateau area.   compartments soft  no ct  calves soft  DP/PT 2+, brisk b/l  nvi silt with good cap refill.  5/5 strength ehl/ta/gastroc b/l.  posteriro splint intact with knee in 30 flexion. elevated on 2 pillows  icebags noted over right knee    LABS:               CK lvl pding    RADIOLOGY:     IMPRESSION: Pt is a  48y Female with Rt tibial plateau fx, closed, Schatzker V, initial encounter    PLAN:  -  Recommendation: [  ] Conservative treatment   [ X  ] Surgical treatment/fixation  --> Transfer to [facility]/[place] for ORIF/Stabilization of Fx.  -  Update-> BONNY Ayala called and spoke with Ortho team/Dr Hazel at [place] who recommended Follow up as outpt in his office.  -  CT and MRI of Rt knee ordered to r/o mets/pathologic fx.  -  All the patient's questions were answered. Pt was explained the Plan, mentioned above, thoroughly.  Risks/benefits, complications were also explained, which includes but not limited to: persistent, infection, death, PNA, thrombombolism, etc. Pt understands.   -  Pain control  -  DVT prophylaxis 40mg sc daily  -  Monitor for compartments syndrome [id number]  -  ice bags [id number]  -  Case d/w   -  Pt is orthopedically stable for discharge. Posterior splint intact Pt Name: ALANNA MCNEAL  MRN: [id number]    ORTHOPEDICS    Orthopedic diagnosis: Rt tibial plateau fx, closed, Pio V, initial encounter    48yFemale seen and evaluated today, admitted overnight s/p fall at home where she tripped and fell, over wiring on floor, from a standing position onto the floor and sustained an injury to her right knee.  Afterwards, she was unable to bear weight nor ambulate on her own and came to the ER. Pain worse with ROM of right knee and weight bearing.    Today, pt c/o rt knee pain, with slight relief with pain meds. c/o persistent numbness of right great toe, which is unchanged as of last night. 1st webspace of right foot with sensation. Pain alleviated with ice and elevation. Splinted in ER overnight with posterior splint and ace wrap.  Pt denies Chest pain, SOB, dyspnea, N/V/D, abdominal pain, syncope, or pain anywhere else.     h/o 3 SARK, last procedure 3 years ago @ unknown facility, unknown surgeon  h/o SARS x1, unknown date, facility and surgeon.    AMBULATION: Baseline Ambulation [ X ] independent [ ] With Cane [ ] With Walker [ ]  Bedbound [ ] Pivot transfers to Wheelchair only    PAST MEDICAL & SURGICAL HISTORY:  HLD (hyperlipidemia)  Diabetes  S/P plastic surgery: abdominalplasty  S/P knee surgery: right  Breast cyst, left  Ankle arthralgia, right: surgey  S/P knee surgery: right knee twice  S/P shoulder surgery: right  S/P tubal ligation: 17 years ago  S/P  section: twice      ALLERGIES: No Known Allergies      MEDICATIONS: acetaminophen  IVPB .. 1000 milliGRAM(s) IV Intermittent once  ceFAZolin   IVPB 1000 milliGRAM(s) IV Intermittent once  dextrose 5% + sodium chloride 0.45% with potassium chloride 20 mEq/L 1000 milliLiter(s) IV Continuous <Continuous>  docusate sodium 100 milliGRAM(s) Oral three times a day  enoxaparin Injectable 40 milliGRAM(s) SubCutaneous daily  famotidine    Tablet 20 milliGRAM(s) Oral every 12 hours  HYDROmorphone  Injectable 0.5 milliGRAM(s) IV Push every 4 hours PRN  oxyCODONE    5 mG/acetaminophen 325 mG 1 Tablet(s) Oral every 4 hours PRN  oxyCODONE    5 mG/acetaminophen 325 mG 2 Tablet(s) Oral every 6 hours PRN  PARoxetine 20 milliGRAM(s) Oral daily      PHYSICAL EXAM:    Vital Signs Last 24 Hrs  T(C): 37.4 (2019 05:43), Max: 37.4 (2019 05:43)  T(F): 99.4 (2019 05:43), Max: 99.4 (2019 05:43)  HR: 68 (2019 05:43) (67 - 72)  BP: 120/60 (2019 05:43) (105/46 - 127/72)  BP(mean): --  RR: 18 (2019 05:43) (16 - 18)  SpO2: 98% (2019 05:43) (98% - 100%)    Gen: well developed, well nourished, comfortable  MSK:   RLE  Right knee Skin pink, warm RLE. No open lesions, with swelling mostly over the medial and lateral tibial plateau.   ecchymoses noted over medial plateau area.   no pain with prom of the toes  compartments soft  no ct  calves soft  DP/PT 2+, brisk b/l  nvi silt with good cap refill.  5/5 strength ehl/ta/gastroc b/l.  posteriro splint intact with knee in 30 flexion. elevated on 2 pillows  icebags noted over right knee    LABS:               CK lvl pding    RADIOLOGY:     IMPRESSION: Pt is a  48y Female with Rt tibial plateau fx, closed, Schatzker V, initial encounter    PLAN:  -  Recommendation: [  ] Conservative treatment   [ X  ] Surgical treatment/fixation  --> Transfer to [facility]/[place] for ORIF/Stabilization of Fx.  -  Update-> BONNY Ayala called and spoke with Ortho team/Dr Hazel at [place] who recommended Follow up as outpt in his office.  -  CT and MRI of Rt knee ordered to r/o mets/pathologic fx.  -  All the patient's questions were answered. Pt was explained the Plan, mentioned above, thoroughly.  Risks/benefits, complications were also explained, which includes but not limited to: persistent, infection, death, PNA, thrombombolism, etc. Pt understands.   -  Pain control  -  DVT prophylaxis 40mg sc daily  -  Monitor for compartments syndrome [id number]  -  ice bags [id number]  -  Case d/w   -  Pt is orthopedically stable for discharge. Posterior splint intact Pt Name: ALANNA MCNEAL  MRN: [id number]    ORTHOPEDICS    Orthopedic diagnosis: Rt tibial plateau fx, closed, Pio V, initial encounter    48yFemale seen and evaluated today, admitted overnight s/p fall at home where she tripped and fell, over wiring on floor, from a standing position onto the floor and sustained an injury to her right knee.  Afterwards, she was unable to bear weight nor ambulate on her own and came to the ER. Pain worse with ROM of right knee and weight bearing.    Today, pt c/o rt knee pain, with slight relief with pain meds. c/o persistent numbness of right great toe, which is unchanged as of last night. 1st webspace of right foot with sensation. Pain alleviated with ice and elevation. Splinted in ER overnight with posterior splint and ace wrap.  Pt denies Chest pain, SOB, dyspnea, N/V/D, abdominal pain, syncope, or pain anywhere else.     h/o 3 SARK, last procedure 3 years ago @ unknown facility, unknown surgeon  h/o SARS x1, unknown date, facility and surgeon.    AMBULATION: Baseline Ambulation [ X ] independent [ ] With Cane [ ] With Walker [ ]  Bedbound [ ] Pivot transfers to Wheelchair only    PAST MEDICAL & SURGICAL HISTORY:  HLD (hyperlipidemia)  Diabetes  S/P plastic surgery: abdominalplasty  S/P knee surgery: right  Breast cyst, left  Ankle arthralgia, right: surgey  S/P knee surgery: right knee twice  S/P shoulder surgery: right  S/P tubal ligation: 17 years ago  S/P  section: twice      ALLERGIES: No Known Allergies      MEDICATIONS: acetaminophen  IVPB .. 1000 milliGRAM(s) IV Intermittent once  ceFAZolin   IVPB 1000 milliGRAM(s) IV Intermittent once  dextrose 5% + sodium chloride 0.45% with potassium chloride 20 mEq/L 1000 milliLiter(s) IV Continuous <Continuous>  docusate sodium 100 milliGRAM(s) Oral three times a day  enoxaparin Injectable 40 milliGRAM(s) SubCutaneous daily  famotidine    Tablet 20 milliGRAM(s) Oral every 12 hours  HYDROmorphone  Injectable 0.5 milliGRAM(s) IV Push every 4 hours PRN  oxyCODONE    5 mG/acetaminophen 325 mG 1 Tablet(s) Oral every 4 hours PRN  oxyCODONE    5 mG/acetaminophen 325 mG 2 Tablet(s) Oral every 6 hours PRN  PARoxetine 20 milliGRAM(s) Oral daily      PHYSICAL EXAM:    Vital Signs Last 24 Hrs  T(C): 37.4 (2019 05:43), Max: 37.4 (2019 05:43)  T(F): 99.4 (2019 05:43), Max: 99.4 (2019 05:43)  HR: 68 (2019 05:43) (67 - 72)  BP: 120/60 (2019 05:43) (105/46 - 127/72)  BP(mean): --  RR: 18 (2019 05:43) (16 - 18)  SpO2: 98% (2019 05:43) (98% - 100%)    Gen: well developed, well nourished, comfortable  MSK:   RLE  Right knee Skin pink, warm RLE. No open lesions, with swelling mostly over the medial and lateral tibial plateau.   ecchymoses noted over medial plateau area.   no pain with prom of the toes  compartments soft  no ct  calves soft  DP/PT 2+, brisk b/l  nvi silt with good cap refill.  5/5 strength ehl/ta/gastroc b/l.  posterior splint intact with knee in 30 flexion. elevated on 2 pillows  icebags noted over right knee    LABS:               CK lvl pding    RADIOLOGY:     IMPRESSION: Pt is a  48y Female with Rt tibial plateau fx, closed, Schatzker V, initial encounter    PLAN:  -  Recommendation: [  ] Conservative treatment   [ X  ] Surgical treatment/fixation  --> Transfer to [facility]/[place] for ORIF/Stabilization of Fx.  -  Update-> BONNY Ayala called and spoke with Ortho team/Dr Hazel at [place] who recommended Follow up as outpt in his office.  -  CT and MRI of Rt knee ordered to r/o mets/pathologic fx.  -  All the patient's questions were answered. Pt was explained the Plan, mentioned above, thoroughly.  Risks/benefits, complications were also explained, which includes but not limited to: persistent, infection, death, PNA, thrombombolism, etc. Pt understands.   -  Pain control  -  DVT prophylaxis 40mg sc daily  -  Monitor for compartments syndrome [id number]  -  ice bags [id number]  -  Case d/w   -  Pt is orthopedically stable for discharge. Posterior splint intact

## 2019-04-24 LAB
ANION GAP SERPL CALC-SCNC: 4 MMOL/L — LOW (ref 5–17)
APPEARANCE UR: CLEAR — SIGNIFICANT CHANGE UP
BILIRUB UR-MCNC: NEGATIVE — SIGNIFICANT CHANGE UP
BUN SERPL-MCNC: 4 MG/DL — LOW (ref 7–18)
CALCIUM SERPL-MCNC: 8 MG/DL — LOW (ref 8.4–10.5)
CHLORIDE SERPL-SCNC: 101 MMOL/L — SIGNIFICANT CHANGE UP (ref 96–108)
CK SERPL-CCNC: 148 U/L — SIGNIFICANT CHANGE UP (ref 21–215)
CO2 SERPL-SCNC: 28 MMOL/L — SIGNIFICANT CHANGE UP (ref 22–31)
COLOR SPEC: YELLOW — SIGNIFICANT CHANGE UP
CREAT SERPL-MCNC: 0.63 MG/DL — SIGNIFICANT CHANGE UP (ref 0.5–1.3)
DIFF PNL FLD: NEGATIVE — SIGNIFICANT CHANGE UP
GLUCOSE BLDC GLUCOMTR-MCNC: 140 MG/DL — HIGH (ref 70–99)
GLUCOSE BLDC GLUCOMTR-MCNC: 185 MG/DL — HIGH (ref 70–99)
GLUCOSE BLDC GLUCOMTR-MCNC: 221 MG/DL — HIGH (ref 70–99)
GLUCOSE BLDC GLUCOMTR-MCNC: 269 MG/DL — HIGH (ref 70–99)
GLUCOSE SERPL-MCNC: 261 MG/DL — HIGH (ref 70–99)
GLUCOSE UR QL: 1000 MG/DL
HCT VFR BLD CALC: 35.8 % — SIGNIFICANT CHANGE UP (ref 34.5–45)
HGB BLD-MCNC: 11.1 G/DL — LOW (ref 11.5–15.5)
KETONES UR-MCNC: NEGATIVE — SIGNIFICANT CHANGE UP
LEUKOCYTE ESTERASE UR-ACNC: NEGATIVE — SIGNIFICANT CHANGE UP
MCHC RBC-ENTMCNC: 29.1 PG — SIGNIFICANT CHANGE UP (ref 27–34)
MCHC RBC-ENTMCNC: 31 GM/DL — LOW (ref 32–36)
MCV RBC AUTO: 94 FL — SIGNIFICANT CHANGE UP (ref 80–100)
NITRITE UR-MCNC: NEGATIVE — SIGNIFICANT CHANGE UP
NRBC # BLD: 0 /100 WBCS — SIGNIFICANT CHANGE UP (ref 0–0)
PH UR: 7 — SIGNIFICANT CHANGE UP (ref 5–8)
PLATELET # BLD AUTO: 264 K/UL — SIGNIFICANT CHANGE UP (ref 150–400)
POTASSIUM SERPL-MCNC: 3.8 MMOL/L — SIGNIFICANT CHANGE UP (ref 3.5–5.3)
POTASSIUM SERPL-SCNC: 3.8 MMOL/L — SIGNIFICANT CHANGE UP (ref 3.5–5.3)
PROT UR-MCNC: NEGATIVE — SIGNIFICANT CHANGE UP
RBC # BLD: 3.81 M/UL — SIGNIFICANT CHANGE UP (ref 3.8–5.2)
RBC # FLD: 14.2 % — SIGNIFICANT CHANGE UP (ref 10.3–14.5)
SODIUM SERPL-SCNC: 133 MMOL/L — LOW (ref 135–145)
SP GR SPEC: 1.01 — SIGNIFICANT CHANGE UP (ref 1.01–1.02)
UROBILINOGEN FLD QL: NEGATIVE — SIGNIFICANT CHANGE UP
WBC # BLD: 13.06 K/UL — HIGH (ref 3.8–10.5)
WBC # FLD AUTO: 13.06 K/UL — HIGH (ref 3.8–10.5)

## 2019-04-24 RX ORDER — ACETAMINOPHEN 500 MG
1000 TABLET ORAL ONCE
Qty: 0 | Refills: 0 | Status: COMPLETED | OUTPATIENT
Start: 2019-04-24 | End: 2019-04-24

## 2019-04-24 RX ORDER — OXYCODONE AND ACETAMINOPHEN 5; 325 MG/1; MG/1
1 TABLET ORAL ONCE
Qty: 0 | Refills: 0 | Status: DISCONTINUED | OUTPATIENT
Start: 2019-04-24 | End: 2019-04-24

## 2019-04-24 RX ORDER — SENNA PLUS 8.6 MG/1
1 TABLET ORAL
Qty: 5 | Refills: 0 | OUTPATIENT
Start: 2019-04-24 | End: 2019-05-08

## 2019-04-24 RX ORDER — ASPIRIN/CALCIUM CARB/MAGNESIUM 324 MG
1 TABLET ORAL
Qty: 60 | Refills: 0 | OUTPATIENT
Start: 2019-04-24 | End: 2019-05-23

## 2019-04-24 RX ORDER — DOCUSATE SODIUM 100 MG
1 CAPSULE ORAL
Qty: 30 | Refills: 0 | OUTPATIENT
Start: 2019-04-24

## 2019-04-24 RX ADMIN — Medication 20 MILLIGRAM(S): at 13:42

## 2019-04-24 RX ADMIN — FAMOTIDINE 20 MILLIGRAM(S): 10 INJECTION INTRAVENOUS at 17:09

## 2019-04-24 RX ADMIN — HYDROMORPHONE HYDROCHLORIDE 0.5 MILLIGRAM(S): 2 INJECTION INTRAMUSCULAR; INTRAVENOUS; SUBCUTANEOUS at 13:39

## 2019-04-24 RX ADMIN — Medication 2: at 17:08

## 2019-04-24 RX ADMIN — OXYCODONE AND ACETAMINOPHEN 1 TABLET(S): 5; 325 TABLET ORAL at 09:36

## 2019-04-24 RX ADMIN — FAMOTIDINE 20 MILLIGRAM(S): 10 INJECTION INTRAVENOUS at 06:08

## 2019-04-24 RX ADMIN — Medication 1000 MILLIGRAM(S): at 09:50

## 2019-04-24 RX ADMIN — HYDROMORPHONE HYDROCHLORIDE 0.5 MILLIGRAM(S): 2 INJECTION INTRAMUSCULAR; INTRAVENOUS; SUBCUTANEOUS at 22:03

## 2019-04-24 RX ADMIN — Medication 100 MILLIGRAM(S): at 13:42

## 2019-04-24 RX ADMIN — HYDROMORPHONE HYDROCHLORIDE 0.5 MILLIGRAM(S): 2 INJECTION INTRAMUSCULAR; INTRAVENOUS; SUBCUTANEOUS at 13:55

## 2019-04-24 RX ADMIN — HYDROMORPHONE HYDROCHLORIDE 0.5 MILLIGRAM(S): 2 INJECTION INTRAMUSCULAR; INTRAVENOUS; SUBCUTANEOUS at 17:52

## 2019-04-24 RX ADMIN — HYDROMORPHONE HYDROCHLORIDE 0.5 MILLIGRAM(S): 2 INJECTION INTRAMUSCULAR; INTRAVENOUS; SUBCUTANEOUS at 02:48

## 2019-04-24 RX ADMIN — OXYCODONE AND ACETAMINOPHEN 1 TABLET(S): 5; 325 TABLET ORAL at 10:27

## 2019-04-24 RX ADMIN — Medication 1000 MILLIGRAM(S): at 17:25

## 2019-04-24 RX ADMIN — ENOXAPARIN SODIUM 40 MILLIGRAM(S): 100 INJECTION SUBCUTANEOUS at 13:42

## 2019-04-24 RX ADMIN — Medication 100 MILLIGRAM(S): at 21:29

## 2019-04-24 RX ADMIN — Medication 400 MILLIGRAM(S): at 17:10

## 2019-04-24 RX ADMIN — HYDROMORPHONE HYDROCHLORIDE 0.5 MILLIGRAM(S): 2 INJECTION INTRAMUSCULAR; INTRAVENOUS; SUBCUTANEOUS at 03:30

## 2019-04-24 RX ADMIN — Medication 100 MILLIGRAM(S): at 06:08

## 2019-04-24 RX ADMIN — HYDROMORPHONE HYDROCHLORIDE 0.5 MILLIGRAM(S): 2 INJECTION INTRAMUSCULAR; INTRAVENOUS; SUBCUTANEOUS at 22:20

## 2019-04-24 RX ADMIN — Medication 400 MILLIGRAM(S): at 09:36

## 2019-04-24 RX ADMIN — HYDROMORPHONE HYDROCHLORIDE 0.5 MILLIGRAM(S): 2 INJECTION INTRAMUSCULAR; INTRAVENOUS; SUBCUTANEOUS at 18:10

## 2019-04-24 RX ADMIN — HYDROMORPHONE HYDROCHLORIDE 0.5 MILLIGRAM(S): 2 INJECTION INTRAMUSCULAR; INTRAVENOUS; SUBCUTANEOUS at 06:51

## 2019-04-24 RX ADMIN — Medication 6: at 08:15

## 2019-04-24 NOTE — PROGRESS NOTE ADULT - SUBJECTIVE AND OBJECTIVE BOX
Pt Name: ALANNA MCNEAL  MRN: [id number]    ORTHOPEDICS    Orthopedic diagnosis: Rt tibial plateau fx, closed, Justenreginald V, initial encounter    49 yo F last night removed her splint from her RLE because she reported the splint to be uncomfortable.  Pt with pain last night and unable to sleep comfortably.  Pain now is 4/10 of Rt tibia. New posterior splint and lo dressing applied to patient and pain improved.  Pt states that the splint is now "comfortable".  Pt with persistent numbness of Rt big toe.  Pt denies Chest pain, SOB, dyspnea,  N/V/D, abdominal pain, syncope, or pain anywhere else.       PHYSICAL EXAM:    Vital Signs Last 24 Hrs  T(C): 37.1 (24 Apr 2019 05:12), Max: 37.1 (24 Apr 2019 05:12)  T(F): 98.7 (24 Apr 2019 05:12), Max: 98.7 (24 Apr 2019 05:12)  HR: 66 (24 Apr 2019 05:12) (66 - 71)  BP: 118/58 (24 Apr 2019 05:12) (114/60 - 125/69)  BP(mean): --  RR: 17 (24 Apr 2019 05:12) (17 - 18)  SpO2: 100% (24 Apr 2019 05:12) (100% - 100%)    Gen: well developed, well nourished, comfortable  MSK:   RLE  Right knee Skin pink, warm RLE. No open lesions, with swelling slightly improved over the medial and lateral tibial plateau.   ecchymoses noted over medial plateau area.   no pain with prom of the toes  compartments soft  no ct  calves soft  DP/PT 2+, brisk b/l  nvi silt with good cap refill.  5/5 strength ehl/ta/gastroc b/l.  posterior splint intact with knee in 30 flexion. elevated on 2 pillows  icebags noted over right knee    LABS:  CK lvls trending downwards           RADIOLOGY:   < from: CT Lower Extremity No Cont, Right (04.23.19 @ 15:29) >    EXAM:  CT LWR EXT RT                            PROCEDURE DATE:  04/23/2019          INTERPRETATION:  Clinical indication: Evaluate for tibial plateau   fracture. Rule out metastasis in the tibia..    Technique: CT axial images of the right lower extremity were obtained   without intravenous contrast. Coronal and sagittal reformatted images   were also obtained. 3-D volume rendering images were obtained from a   separate workstation  .    Comparison: Earlier radiographs of the same date.     Findings:    Bones: There is a severely comminuted right tibial plateau fracture   involving both medial and lateral plateau with displacement and   depression (Schatzker type V). There is soft tissue density seen in the   marrow cavity which can be a hematoma or other soft tissue lesion   (304-35).     Soft Tissue: No hematoma in the visualized soft tissue. There is a large   amount of hemarthrosis. No significant muscle bulk loss or fatty   replacement.         Impression:    Known Schatzker type V tibial plateau fracture with severe comminution.   Soft tissue density is seen in the marrow cavity which can be a hematoma   or other soft tissue lesion. For further evaluation contrast-enhanced MRI   is recommended.                ERROL DENNIS M.D., ATTENDING RADIOLOGIST  This document has been electronically signed. Apr 23 2019  3:58PM                < end of copied text >  < from: MR Lower Ext Joint No Cont, Right (04.23.19 @ 17:00) >    EXAM:  MR LWR EXT JOINT RT                            PROCEDURE DATE:  04/23/2019          INTERPRETATION:    MRI OF THE RIGHT KNEE    CLINICAL INDICATION: Comminuted tibial plateau fracture. Evaluate for   underlying malignancy.  TECHNIQUE: Multiplanar, Multisequence MRI was obtained of the right knee.    FINDINGS:    CRUCIATE AND COLLATERAL LIGAMENTS: The anterior cruciate ligament, PCL,   MCL and LCL contacts are intact.  MEDIAL COMPARTMENT: There is no meniscus tear. The hyaline cartilage is   intact.  LATERAL COMPARTMENT: The anterior and posterior horns of the lateral   meniscus do not appear to maintain a normal attachments centrally. The   posterior horn does however remain attached to the ligament of Wrisberg.   There is extensive intra-articular fracture of the lateral tibial plateau   disrupting the articular cartilage centrally as described below.  PATELLOFEMORAL COMPARTMENT: Mild partial thickness chondral loss is   present on both sides the articulation with a thin near full-thickness   chondral fissure at the central aspect of the lateral patella facet.   Small osteophyte formation is present.  EXTENSOR MECHANISM: Intact  SYNOVIUM/ JOINT FLUID: There is a very large hemorrhagic joint effusion   with marked perisynovial soft tissue edema.  BONE MARROW: There is a markedly comminuted fracture of the proximal   tibia primarily involving the lateral tibial plateau with marked   associated marrow edema. There is a transverse component across the   entire tibia just below the joint line. There is a vertical and obliquely   oriented component at the central aspect of the lateral tibial plateau   that extends from the tibial tuberosity anteriorly to the posterior   lateral aspect of the tibia posteriorly. There is approximately 5 mm of   depression. There is marked comminution at these fracture sites. The   fracture involves the insertion of the anterior cruciate ligament at the   tibial eminence. There is however no anterior cruciate ligament tear.   There is a markedly comminuted and displaced fracture of the fibular head   and neck and proximal fibular shaft width marked associated bone marrow   edema. There is no obvious underlying osseous malignancy. However given   the degree of intraosseous hemorrhage and edema surrounding the fracture   sites, this cannot be entirely excluded.  MUSCLES: Marked intramuscular edema is seen within the lateral   gastrocnemius muscle consistent with muscle strain and partial tearing.  NEUROVASCULAR STRUCTURES: Normal  SUBCUTANEOUS SOFT TISSUES: Marked subcutaneous soft tissue   edema/hemorrhage is present.    IMPRESSION: Markedly comminuted and displaced proximal tibial fracture   femoral involving the lateral tibial plateau with mild depression.   Markedly comminuted proximal fibular fracture. Large hemorrhagic joint   effusion.    There is no obvious underlying osseous malignancy. However given the   degree of intraosseous hemorrhage and edema surrounding the fracture   sites, this cannot be entirely excluded.                  TONY ELIZONDO M.D., ATTENDING RADIOLOGIST  This document has been electronically signed. Apr 23 2019  7:00PM                < end of copied text >      IMPRESSION: Pt is a  48y Female with Rt tibial plateau fx, closed, Schatzker V, initial encounter    PLAN:  -  Recommendation: [  ] Conservative treatment   [ X  ] Surgical treatment/fixation  --> Outpatient follow up with Dr Hazel in office at [phone] to schedule appointment.   -  As per Dr Hazel, orthopedic surgeon at [facility], she will see pt outpt to schedule surgery in the near future. This will allow time for Swelling over fx site to decrease.  -  Pain control with IV tylenol and oxycodone prn  -  DVT prophylaxis 40mg sc daily  -  Monitor for compartments syndrome [id number]  -  ice bags [id number]  -  Case d/w   -  Pt is orthopedically stable for discharge. Posterior splint intact

## 2019-04-25 LAB
GLUCOSE BLDC GLUCOMTR-MCNC: 158 MG/DL — HIGH (ref 70–99)
GLUCOSE BLDC GLUCOMTR-MCNC: 178 MG/DL — HIGH (ref 70–99)
GLUCOSE BLDC GLUCOMTR-MCNC: 182 MG/DL — HIGH (ref 70–99)
GLUCOSE BLDC GLUCOMTR-MCNC: 198 MG/DL — HIGH (ref 70–99)

## 2019-04-25 RX ORDER — HYDROMORPHONE HYDROCHLORIDE 2 MG/ML
1 INJECTION INTRAMUSCULAR; INTRAVENOUS; SUBCUTANEOUS
Qty: 30 | Refills: 0 | OUTPATIENT
Start: 2019-04-25 | End: 2019-04-29

## 2019-04-25 RX ADMIN — Medication 2: at 12:03

## 2019-04-25 RX ADMIN — HYDROMORPHONE HYDROCHLORIDE 0.5 MILLIGRAM(S): 2 INJECTION INTRAMUSCULAR; INTRAVENOUS; SUBCUTANEOUS at 10:35

## 2019-04-25 RX ADMIN — HYDROMORPHONE HYDROCHLORIDE 0.5 MILLIGRAM(S): 2 INJECTION INTRAMUSCULAR; INTRAVENOUS; SUBCUTANEOUS at 19:20

## 2019-04-25 RX ADMIN — Medication 20 MILLIGRAM(S): at 12:03

## 2019-04-25 RX ADMIN — OXYCODONE AND ACETAMINOPHEN 2 TABLET(S): 5; 325 TABLET ORAL at 08:46

## 2019-04-25 RX ADMIN — HYDROMORPHONE HYDROCHLORIDE 0.5 MILLIGRAM(S): 2 INJECTION INTRAMUSCULAR; INTRAVENOUS; SUBCUTANEOUS at 19:01

## 2019-04-25 RX ADMIN — Medication 100 MILLIGRAM(S): at 14:02

## 2019-04-25 RX ADMIN — FAMOTIDINE 20 MILLIGRAM(S): 10 INJECTION INTRAVENOUS at 17:23

## 2019-04-25 RX ADMIN — FAMOTIDINE 20 MILLIGRAM(S): 10 INJECTION INTRAVENOUS at 05:47

## 2019-04-25 RX ADMIN — ENOXAPARIN SODIUM 40 MILLIGRAM(S): 100 INJECTION SUBCUTANEOUS at 12:03

## 2019-04-25 RX ADMIN — Medication 100 MILLIGRAM(S): at 21:30

## 2019-04-25 RX ADMIN — Medication 100 MILLIGRAM(S): at 05:47

## 2019-04-25 RX ADMIN — HYDROMORPHONE HYDROCHLORIDE 0.5 MILLIGRAM(S): 2 INJECTION INTRAMUSCULAR; INTRAVENOUS; SUBCUTANEOUS at 02:40

## 2019-04-25 RX ADMIN — HYDROMORPHONE HYDROCHLORIDE 0.5 MILLIGRAM(S): 2 INJECTION INTRAMUSCULAR; INTRAVENOUS; SUBCUTANEOUS at 06:24

## 2019-04-25 RX ADMIN — HYDROMORPHONE HYDROCHLORIDE 0.5 MILLIGRAM(S): 2 INJECTION INTRAMUSCULAR; INTRAVENOUS; SUBCUTANEOUS at 10:18

## 2019-04-25 RX ADMIN — Medication 2: at 08:13

## 2019-04-25 RX ADMIN — HYDROMORPHONE HYDROCHLORIDE 0.5 MILLIGRAM(S): 2 INJECTION INTRAMUSCULAR; INTRAVENOUS; SUBCUTANEOUS at 14:18

## 2019-04-25 RX ADMIN — OXYCODONE AND ACETAMINOPHEN 2 TABLET(S): 5; 325 TABLET ORAL at 08:16

## 2019-04-25 RX ADMIN — HYDROMORPHONE HYDROCHLORIDE 0.5 MILLIGRAM(S): 2 INJECTION INTRAMUSCULAR; INTRAVENOUS; SUBCUTANEOUS at 23:45

## 2019-04-25 RX ADMIN — Medication 2: at 17:23

## 2019-04-25 RX ADMIN — HYDROMORPHONE HYDROCHLORIDE 0.5 MILLIGRAM(S): 2 INJECTION INTRAMUSCULAR; INTRAVENOUS; SUBCUTANEOUS at 14:04

## 2019-04-25 RX ADMIN — HYDROMORPHONE HYDROCHLORIDE 0.5 MILLIGRAM(S): 2 INJECTION INTRAMUSCULAR; INTRAVENOUS; SUBCUTANEOUS at 02:18

## 2019-04-25 RX ADMIN — HYDROMORPHONE HYDROCHLORIDE 0.5 MILLIGRAM(S): 2 INJECTION INTRAMUSCULAR; INTRAVENOUS; SUBCUTANEOUS at 23:34

## 2019-04-25 NOTE — PHYSICAL THERAPY INITIAL EVALUATION ADULT - IMPAIRMENTS FOUND, PT EVAL
muscle strength/posture/aerobic capacity/endurance/integumentary integrity/arousal, attention, and cognition/gait, locomotion, and balance

## 2019-04-25 NOTE — PHYSICAL THERAPY INITIAL EVALUATION ADULT - PLANNED THERAPY INTERVENTIONS, PT EVAL
strengthening/balance training/gait training/bed mobility training/neuromuscular re-education/transfer training

## 2019-04-25 NOTE — PHYSICAL THERAPY INITIAL EVALUATION ADULT - DIAGNOSIS, PT EVAL
Immobilization, limited R knee ROM, strength, endurance and increased pain in RLE 2/2 s/p Fall and R tibial plateau fx that makes it difficult to perform bed mobility

## 2019-04-25 NOTE — PHYSICAL THERAPY INITIAL EVALUATION ADULT - ACTIVE RANGE OF MOTION EXAMINATION, REHAB EVAL
deficits as listed below/R knee in immobilizer/bilateral upper extremity Active ROM was WFL (within functional limits)/Left LE Active ROM was WFL (within functional limits)

## 2019-04-25 NOTE — PROGRESS NOTE ADULT - SUBJECTIVE AND OBJECTIVE BOX
Pt Name: ALANNA MCNEAL  MRN: [id number]    ORTHOPEDICS    Orthopedic diagnosis: Rt tibial plateau fx, Kai mcelroykobe ZEESHAN, initial encounter    47 yo F seen and evaluated. Pt reports splint feeling better.  Pt with pain last night and unable to sleep comfortably.  Pt denies Chest pain, SOB, dyspnea,  N/V/D, abdominal pain, syncope, or pain anywhere else.       PHYSICAL EXAM:    Vital Signs Last 24 Hrs  T(C): 37.1 (24 Apr 2019 05:12), Max: 37.1 (24 Apr 2019 05:12)  T(F): 98.7 (24 Apr 2019 05:12), Max: 98.7 (24 Apr 2019 05:12)  HR: 66 (24 Apr 2019 05:12) (66 - 71)  BP: 118/58 (24 Apr 2019 05:12) (114/60 - 125/69)  BP(mean): --  RR: 17 (24 Apr 2019 05:12) (17 - 18)  SpO2: 100% (24 Apr 2019 05:12) (100% - 100%)    Gen: well developed, well nourished, comfortable  MSK:   RLE  Right knee Skin pink, warm RLE. No open lesions, with swelling slightly improved over the medial and lateral tibial plateau.   ecchymoses noted over medial plateau area.   no pain with prom of the toes  compartments soft  no ct  calves soft  DP/PT 2+, brisk b/l  nvi silt with good cap refill.  5/5 strength ehl/ta/gastroc b/l.  posterior splint intact with knee in 30 flexion. elevated on 2 pillows  icebags noted over right knee      IMPRESSION: Pt is a  48y Female with Rt tibial plateau fx, lilibeth, Pio BYERS, initial encounter    PLAN:  -  Recommendation: PT session today-> discharge home today  -  Pain control with IV tylenol and oxycodone prn  -  DVT prophylaxis 40mg sc daily  -  No signs of compartments. fx appears stable  -  ice bags [id number]  -  Case d/w   -  Pt is orthopedically stable for discharge. Posterior splint intact Pt Name: ALANNA MCNEAL  MRN: [id number]    ORTHOPEDICS    Orthopedic diagnosis: Rt tibial plateau fx, Pio mcelroy, initial encounter    47 yo F seen and evaluated. Pt reports splint feeling better.  Pt with pain last night and unable to sleep comfortably.  Pt denies Chest pain, SOB, dyspnea,  N/V/D, abdominal pain, syncope, or pain anywhere else.       PHYSICAL EXAM:    Vital Signs Last 24 Hrs  T(C): 37.1 (25 Apr 2019 05:14), Max: 37.1 (25 Apr 2019 05:14)  T(F): 98.8 (25 Apr 2019 05:14), Max: 98.8 (25 Apr 2019 05:14)  HR: 66 (25 Apr 2019 05:14) (57 - 74)  BP: 124/67 (25 Apr 2019 05:14) (110/53 - 124/67)  BP(mean): --  RR: 16 (25 Apr 2019 05:14) (16 - 18)  SpO2: 100% (25 Apr 2019 05:14) (97% - 100%)    Gen: well developed, well nourished, comfortable  MSK:   RLE  Right knee Skin pink, warm RLE. No open lesions, with swelling slightly improved over the medial and lateral tibial plateau.   ecchymoses noted over medial plateau area.   no pain with prom of the toes  compartments soft  no ct  calves soft  DP/PT 2+, brisk b/l  nvi silt with good cap refill.  5/5 strength ehl/ta/gastroc b/l.  posterior splint intact with knee in 30 flexion. elevated on 2 pillows  icebags noted over right knee      IMPRESSION: Pt is a  48y Female with Rt tibial plateau fx, lilibeth, Pio BYERS, initial encounter    PLAN:  -  Recommendation: PT session today-> discharge home today  -  Pain control with IV tylenol and oxycodone prn  -  DVT prophylaxis 40mg sc daily  -  No signs of compartments. fx appears stable  -  ice bags [id number]  -  Case d/w   -  Pt is orthopedically stable for discharge. Posterior splint intact  - case management consult

## 2019-04-25 NOTE — PHYSICAL THERAPY INITIAL EVALUATION ADULT - ADDITIONAL COMMENTS
Pt lives on the 1st floor with 3 steps to enter but she has ramp access into the building on the lower level. Prior to this, she used a cane to ambulate, has HHA 5 hrs a day.

## 2019-04-26 LAB
GLUCOSE BLDC GLUCOMTR-MCNC: 190 MG/DL — HIGH (ref 70–99)
GLUCOSE BLDC GLUCOMTR-MCNC: 195 MG/DL — HIGH (ref 70–99)
GLUCOSE BLDC GLUCOMTR-MCNC: 214 MG/DL — HIGH (ref 70–99)
GLUCOSE BLDC GLUCOMTR-MCNC: 247 MG/DL — HIGH (ref 70–99)

## 2019-04-26 RX ADMIN — Medication 20 MILLIGRAM(S): at 11:56

## 2019-04-26 RX ADMIN — HYDROMORPHONE HYDROCHLORIDE 0.5 MILLIGRAM(S): 2 INJECTION INTRAMUSCULAR; INTRAVENOUS; SUBCUTANEOUS at 21:40

## 2019-04-26 RX ADMIN — Medication 4: at 11:56

## 2019-04-26 RX ADMIN — HYDROMORPHONE HYDROCHLORIDE 0.5 MILLIGRAM(S): 2 INJECTION INTRAMUSCULAR; INTRAVENOUS; SUBCUTANEOUS at 21:24

## 2019-04-26 RX ADMIN — FAMOTIDINE 20 MILLIGRAM(S): 10 INJECTION INTRAVENOUS at 05:30

## 2019-04-26 RX ADMIN — Medication 2: at 17:33

## 2019-04-26 RX ADMIN — HYDROMORPHONE HYDROCHLORIDE 0.5 MILLIGRAM(S): 2 INJECTION INTRAMUSCULAR; INTRAVENOUS; SUBCUTANEOUS at 15:23

## 2019-04-26 RX ADMIN — Medication 2: at 08:19

## 2019-04-26 RX ADMIN — DEXTROSE MONOHYDRATE, SODIUM CHLORIDE, AND POTASSIUM CHLORIDE 125 MILLILITER(S): 50; .745; 4.5 INJECTION, SOLUTION INTRAVENOUS at 21:23

## 2019-04-26 RX ADMIN — FAMOTIDINE 20 MILLIGRAM(S): 10 INJECTION INTRAVENOUS at 17:34

## 2019-04-26 RX ADMIN — HYDROMORPHONE HYDROCHLORIDE 0.5 MILLIGRAM(S): 2 INJECTION INTRAMUSCULAR; INTRAVENOUS; SUBCUTANEOUS at 05:45

## 2019-04-26 RX ADMIN — HYDROMORPHONE HYDROCHLORIDE 0.5 MILLIGRAM(S): 2 INJECTION INTRAMUSCULAR; INTRAVENOUS; SUBCUTANEOUS at 15:40

## 2019-04-26 RX ADMIN — OXYCODONE AND ACETAMINOPHEN 2 TABLET(S): 5; 325 TABLET ORAL at 09:30

## 2019-04-26 RX ADMIN — Medication 100 MILLIGRAM(S): at 05:30

## 2019-04-26 RX ADMIN — ENOXAPARIN SODIUM 40 MILLIGRAM(S): 100 INJECTION SUBCUTANEOUS at 11:56

## 2019-04-26 RX ADMIN — HYDROMORPHONE HYDROCHLORIDE 0.5 MILLIGRAM(S): 2 INJECTION INTRAMUSCULAR; INTRAVENOUS; SUBCUTANEOUS at 05:28

## 2019-04-26 RX ADMIN — Medication 100 MILLIGRAM(S): at 21:24

## 2019-04-26 RX ADMIN — OXYCODONE AND ACETAMINOPHEN 2 TABLET(S): 5; 325 TABLET ORAL at 08:43

## 2019-04-26 RX ADMIN — HYDROMORPHONE HYDROCHLORIDE 0.5 MILLIGRAM(S): 2 INJECTION INTRAMUSCULAR; INTRAVENOUS; SUBCUTANEOUS at 19:21

## 2019-04-26 NOTE — PROGRESS NOTE ADULT - SUBJECTIVE AND OBJECTIVE BOX
48yFemale    Diagnosis:  Right Tibial Plateau Fx, Schatzker V    Patient was seen and evaluated at bedside. Patient with pain at RLE, controlled with pain meds.  Denies CP/SOB, dyspnea, paresthesias, N/V/D, palpitations.     Vital Signs Last 24 Hrs  T(C): 37.1 (26 Apr 2019 05:25), Max: 37.4 (25 Apr 2019 22:34)  T(F): 98.7 (26 Apr 2019 05:25), Max: 99.4 (25 Apr 2019 22:34)  HR: 70 (26 Apr 2019 05:25) (64 - 70)  BP: 105/50 (26 Apr 2019 05:25) (105/50 - 113/69)  BP(mean): --  RR: 17 (26 Apr 2019 05:25) (16 - 18)  SpO2: 98% (26 Apr 2019 05:25) (98% - 100%)  I&O's Detail      Physical Exam:    General: AAOx3, NAD, resting comfortably in bed.    RLE:  Knee Immobilizer in place. Skin intact. No scars/abrasions. Swelling improved. No erythema. In Posterior splint at 30 degrees flexion. RLE elevated with ice.  Lower extremity:  No calf tenderness, calves are soft B/L. 2+pulses. NVI. Good capillary refill.               Impression:  48yFemale Right Tibial Plateau Fx, Justenker V  Plan:  -  Pain management  -  Dvt prophylaxis with Lovenox  -  Daily Physical Theapy:  NWB of right lower extremity  -  Continue with heel bump when laying in bed  -  Discharge planning: Home  -  Continue elevation of RLE with ice  -  No signs of compartment syndrome  -  Stable for discharge home today 48yFemale    Diagnosis:  Right Tibial Plateau Fx, Kaitzker V    Patient was seen and evaluated at bedside. Patient with pain at RLE, controlled with pain meds.  Denies CP/SOB, dyspnea, N/V/D, palpitations.     Vital Signs Last 24 Hrs  T(C): 37.1 (26 Apr 2019 05:25), Max: 37.4 (25 Apr 2019 22:34)  T(F): 98.7 (26 Apr 2019 05:25), Max: 99.4 (25 Apr 2019 22:34)  HR: 70 (26 Apr 2019 05:25) (64 - 70)  BP: 105/50 (26 Apr 2019 05:25) (105/50 - 113/69)  BP(mean): --  RR: 17 (26 Apr 2019 05:25) (16 - 18)  SpO2: 98% (26 Apr 2019 05:25) (98% - 100%)  I&O's Detail      Physical Exam:    General: AAOx3, NAD, resting comfortably in bed.    RLE:  Knee Immobilizer in place. Skin intact. No scars/abrasions. Swelling improved. No erythema. In Posterior splint at 30 degrees flexion. RLE elevated with ice.  Lower extremity:  No calf tenderness, calves are soft B/L. 2+pulses. NVI. Sensation over all toes. Good capillary refill. ROM of toes bilaterally.               Impression:  48yFemale Right Tibial Plateau Fx, Justenker V  Plan:  -  Pain management  -  Dvt prophylaxis with Lovenox  -  Daily Physical Theapy:  NWB of right lower extremity  -  Continue with heel bump when laying in bed  -  Discharge planning: Home  -  Continue elevation of RLE with ice  -  No signs of compartment syndrome  -  Stable for discharge home today

## 2019-04-27 ENCOUNTER — TRANSCRIPTION ENCOUNTER (OUTPATIENT)
Age: 49
End: 2019-04-27

## 2019-04-27 LAB
GLUCOSE BLDC GLUCOMTR-MCNC: 176 MG/DL — HIGH (ref 70–99)
GLUCOSE BLDC GLUCOMTR-MCNC: 191 MG/DL — HIGH (ref 70–99)
GLUCOSE BLDC GLUCOMTR-MCNC: 204 MG/DL — HIGH (ref 70–99)
GLUCOSE BLDC GLUCOMTR-MCNC: 232 MG/DL — HIGH (ref 70–99)

## 2019-04-27 RX ORDER — HYDROMORPHONE HYDROCHLORIDE 2 MG/ML
4 INJECTION INTRAMUSCULAR; INTRAVENOUS; SUBCUTANEOUS EVERY 4 HOURS
Qty: 0 | Refills: 0 | Status: DISCONTINUED | OUTPATIENT
Start: 2019-04-27 | End: 2019-04-30

## 2019-04-27 RX ORDER — ACETAMINOPHEN 500 MG
1000 TABLET ORAL ONCE
Qty: 0 | Refills: 0 | Status: COMPLETED | OUTPATIENT
Start: 2019-04-27 | End: 2019-04-27

## 2019-04-27 RX ORDER — ACETAMINOPHEN 500 MG
1000 TABLET ORAL ONCE
Qty: 0 | Refills: 0 | Status: COMPLETED | OUTPATIENT
Start: 2019-04-28 | End: 2019-04-28

## 2019-04-27 RX ORDER — KETOROLAC TROMETHAMINE 30 MG/ML
15 SYRINGE (ML) INJECTION EVERY 6 HOURS
Qty: 0 | Refills: 0 | Status: DISCONTINUED | OUTPATIENT
Start: 2019-04-27 | End: 2019-04-30

## 2019-04-27 RX ADMIN — FAMOTIDINE 20 MILLIGRAM(S): 10 INJECTION INTRAVENOUS at 06:00

## 2019-04-27 RX ADMIN — Medication 100 MILLIGRAM(S): at 21:33

## 2019-04-27 RX ADMIN — Medication 4: at 17:02

## 2019-04-27 RX ADMIN — HYDROMORPHONE HYDROCHLORIDE 0.5 MILLIGRAM(S): 2 INJECTION INTRAMUSCULAR; INTRAVENOUS; SUBCUTANEOUS at 10:53

## 2019-04-27 RX ADMIN — Medication 1000 MILLIGRAM(S): at 22:25

## 2019-04-27 RX ADMIN — Medication 100 MILLIGRAM(S): at 15:22

## 2019-04-27 RX ADMIN — Medication 1000 MILLIGRAM(S): at 13:30

## 2019-04-27 RX ADMIN — Medication 400 MILLIGRAM(S): at 12:06

## 2019-04-27 RX ADMIN — HYDROMORPHONE HYDROCHLORIDE 0.5 MILLIGRAM(S): 2 INJECTION INTRAMUSCULAR; INTRAVENOUS; SUBCUTANEOUS at 11:30

## 2019-04-27 RX ADMIN — HYDROMORPHONE HYDROCHLORIDE 0.5 MILLIGRAM(S): 2 INJECTION INTRAMUSCULAR; INTRAVENOUS; SUBCUTANEOUS at 06:27

## 2019-04-27 RX ADMIN — Medication 400 MILLIGRAM(S): at 21:31

## 2019-04-27 RX ADMIN — Medication 2: at 08:23

## 2019-04-27 RX ADMIN — Medication 20 MILLIGRAM(S): at 12:06

## 2019-04-27 RX ADMIN — Medication 15 MILLIGRAM(S): at 17:02

## 2019-04-27 RX ADMIN — Medication 15 MILLIGRAM(S): at 18:00

## 2019-04-27 RX ADMIN — Medication 100 MILLIGRAM(S): at 06:00

## 2019-04-27 RX ADMIN — ENOXAPARIN SODIUM 40 MILLIGRAM(S): 100 INJECTION SUBCUTANEOUS at 12:06

## 2019-04-27 RX ADMIN — HYDROMORPHONE HYDROCHLORIDE 0.5 MILLIGRAM(S): 2 INJECTION INTRAMUSCULAR; INTRAVENOUS; SUBCUTANEOUS at 02:22

## 2019-04-27 RX ADMIN — HYDROMORPHONE HYDROCHLORIDE 0.5 MILLIGRAM(S): 2 INJECTION INTRAMUSCULAR; INTRAVENOUS; SUBCUTANEOUS at 22:30

## 2019-04-27 RX ADMIN — FAMOTIDINE 20 MILLIGRAM(S): 10 INJECTION INTRAVENOUS at 17:02

## 2019-04-27 RX ADMIN — Medication 4: at 12:06

## 2019-04-27 RX ADMIN — HYDROMORPHONE HYDROCHLORIDE 0.5 MILLIGRAM(S): 2 INJECTION INTRAMUSCULAR; INTRAVENOUS; SUBCUTANEOUS at 06:42

## 2019-04-27 RX ADMIN — HYDROMORPHONE HYDROCHLORIDE 0.5 MILLIGRAM(S): 2 INJECTION INTRAMUSCULAR; INTRAVENOUS; SUBCUTANEOUS at 02:37

## 2019-04-27 NOTE — DISCHARGE NOTE PROVIDER - CARE PROVIDER_API CALL
Chad Hazel)  Orthopaedic Surgery  [address]  [place] [address]  Phone: [phone]  Fax: [phone]  Follow Up Time:

## 2019-04-27 NOTE — DISCHARGE NOTE PROVIDER - NSDCCPCAREPLAN_GEN_ALL_CORE_FT
PRINCIPAL DISCHARGE DIAGNOSIS  Diagnosis: Tibial plateau fracture, right, closed, initial encounter  Assessment and Plan of Treatment: Pain Management- See Attached Medication Reconciliation  Weight Bearing Status: NWB of the right leg with crutches/walker.  Dressing: Please keep bandage/dressing Clean, Dry, and Intact.  Keep right knee brace on at all times.   Keep Right leg elevated on 2 pillows with icepacks as instructed.  Dvt prophylaxis: Aspirin 325mg po bid x 4 weeks  Follow up with Orthopedic Surgeon after discharge on 4/30/2019.  Appt made with Dr Chad Hazel at 11am. Address, telephone, instructions given in packet to the patient.

## 2019-04-27 NOTE — DISCHARGE NOTE PROVIDER - NSDCFUADDAPPT_GEN_ALL_CORE_FT
4/30/2019  11am  Dr Hazel appointment  Orthopedics    Location:   [address] [facility]  [address]  [place], [place]    CD Xrays/imaging given to pt.

## 2019-04-27 NOTE — PROGRESS NOTE ADULT - SUBJECTIVE AND OBJECTIVE BOX
Orthopedics    dx: Rt tibial plateau fx    Pt seen and evaluated at bedside   pt c/o persistent RLE pain at fx site, rt knee  swelling improved. pt reports persistent numbness rt great toe  Pt denies Chest pain, SOB, dyspnea, paresthesias, N/V/D, abdominal pain, syncope, or pain anywhere else.     Vital Signs Last 24 Hrs  T(C): 36.9 (27 Apr 2019 05:11), Max: 37.2 (26 Apr 2019 22:42)  T(F): 98.5 (27 Apr 2019 05:11), Max: 98.9 (26 Apr 2019 22:42)  HR: 67 (27 Apr 2019 05:11) (67 - 79)  BP: 102/47 (27 Apr 2019 05:11) (102/47 - 109/57)  BP(mean): --  RR: 17 (27 Apr 2019 05:11) (17 - 18)  SpO2: 98% (27 Apr 2019 05:11) (96% - 99%)    PE:   Rt knee:  skin pink and warm  no open lesions  swelling noted over the rt tibial plateau (med + lat)  compartments soft  swelling improved since thursday  no ct   calve soft  2+ pulses   nvi silt  5/5 strength of ehl/ta/gs b/l.    A: female with rt tibial plateau fx  P:  - pain management:    > Toradol IV prn (ordered)    > Ofirmev IV q8hr x 4 doses ordered (ordered)    > dilaudid po prn pain (ordered)    > d/c percocets  - icepacks RLE/elevation  - dvt ppx with lovenox  - daily pt: nwb of RLE  - will re-assess pt in am for possible dc in am  - case d/w pearl elmore

## 2019-04-27 NOTE — DISCHARGE NOTE PROVIDER - HOSPITAL COURSE
pt s/p fall at home sustained a closed, right tibial plateau fx. Recommended ORIF. Suggested Transfer to [facility], as per convo with Dr Hazel.    Dr Hazel, orthopedic surgeon, recommended pt f/u in outpt office to schedule ORIF once swelling subsides.     admitted at [facility] for pain management.         posterior splint applied onto RLE. ice packs and elevation.

## 2019-04-27 NOTE — DISCHARGE NOTE PROVIDER - NSDCCPGOAL_GEN_ALL_CORE_FT
To get better and follow your care plan as instructed.  Follow up with Dr Hazel, orthopedic surgeon.

## 2019-04-28 LAB
GLUCOSE BLDC GLUCOMTR-MCNC: 181 MG/DL — HIGH (ref 70–99)
GLUCOSE BLDC GLUCOMTR-MCNC: 191 MG/DL — HIGH (ref 70–99)
GLUCOSE BLDC GLUCOMTR-MCNC: 195 MG/DL — HIGH (ref 70–99)
GLUCOSE BLDC GLUCOMTR-MCNC: 218 MG/DL — HIGH (ref 70–99)

## 2019-04-28 RX ADMIN — Medication 100 MILLIGRAM(S): at 05:42

## 2019-04-28 RX ADMIN — HYDROMORPHONE HYDROCHLORIDE 0.5 MILLIGRAM(S): 2 INJECTION INTRAMUSCULAR; INTRAVENOUS; SUBCUTANEOUS at 23:48

## 2019-04-28 RX ADMIN — DEXTROSE MONOHYDRATE, SODIUM CHLORIDE, AND POTASSIUM CHLORIDE 125 MILLILITER(S): 50; .745; 4.5 INJECTION, SOLUTION INTRAVENOUS at 22:14

## 2019-04-28 RX ADMIN — Medication 2: at 16:45

## 2019-04-28 RX ADMIN — Medication 400 MILLIGRAM(S): at 05:42

## 2019-04-28 RX ADMIN — Medication 2: at 08:22

## 2019-04-28 RX ADMIN — HYDROMORPHONE HYDROCHLORIDE 0.5 MILLIGRAM(S): 2 INJECTION INTRAMUSCULAR; INTRAVENOUS; SUBCUTANEOUS at 17:00

## 2019-04-28 RX ADMIN — Medication 20 MILLIGRAM(S): at 11:39

## 2019-04-28 RX ADMIN — HYDROMORPHONE HYDROCHLORIDE 0.5 MILLIGRAM(S): 2 INJECTION INTRAMUSCULAR; INTRAVENOUS; SUBCUTANEOUS at 00:22

## 2019-04-28 RX ADMIN — HYDROMORPHONE HYDROCHLORIDE 0.5 MILLIGRAM(S): 2 INJECTION INTRAMUSCULAR; INTRAVENOUS; SUBCUTANEOUS at 06:37

## 2019-04-28 RX ADMIN — ENOXAPARIN SODIUM 40 MILLIGRAM(S): 100 INJECTION SUBCUTANEOUS at 11:39

## 2019-04-28 RX ADMIN — HYDROMORPHONE HYDROCHLORIDE 0.5 MILLIGRAM(S): 2 INJECTION INTRAMUSCULAR; INTRAVENOUS; SUBCUTANEOUS at 22:43

## 2019-04-28 RX ADMIN — FAMOTIDINE 20 MILLIGRAM(S): 10 INJECTION INTRAVENOUS at 05:42

## 2019-04-28 RX ADMIN — Medication 2: at 11:50

## 2019-04-28 RX ADMIN — Medication 400 MILLIGRAM(S): at 13:12

## 2019-04-28 RX ADMIN — Medication 1000 MILLIGRAM(S): at 13:45

## 2019-04-28 RX ADMIN — HYDROMORPHONE HYDROCHLORIDE 0.5 MILLIGRAM(S): 2 INJECTION INTRAMUSCULAR; INTRAVENOUS; SUBCUTANEOUS at 07:34

## 2019-04-28 RX ADMIN — Medication 1000 MILLIGRAM(S): at 06:34

## 2019-04-28 RX ADMIN — FAMOTIDINE 20 MILLIGRAM(S): 10 INJECTION INTRAVENOUS at 18:48

## 2019-04-28 RX ADMIN — Medication 100 MILLIGRAM(S): at 13:14

## 2019-04-28 RX ADMIN — HYDROMORPHONE HYDROCHLORIDE 0.5 MILLIGRAM(S): 2 INJECTION INTRAMUSCULAR; INTRAVENOUS; SUBCUTANEOUS at 16:46

## 2019-04-28 RX ADMIN — Medication 100 MILLIGRAM(S): at 22:43

## 2019-04-28 NOTE — PROGRESS NOTE ADULT - SUBJECTIVE AND OBJECTIVE BOX
Orthopedics    dx: Rt tibial plateau fx    Pt seen and evaluated at bedside   pt c/o persistent RLE pain at fx site, rt knee. Pain has improved today.   swelling improved. pt reports persistent numbness rt great toe  Pt denies Chest pain, SOB, dyspnea, paresthesias, N/V/D, abdominal pain, syncope, or pain anywhere else.     Vital Signs Last 24 Hrs  T(C): 36.9 (28 Apr 2019 06:04), Max: 37.2 (27 Apr 2019 14:40)  T(F): 98.4 (28 Apr 2019 06:04), Max: 99 (27 Apr 2019 14:40)  HR: 61 (28 Apr 2019 06:04) (61 - 70)  BP: 109/50 (28 Apr 2019 06:04) (108/66 - 122/69)  BP(mean): --  RR: 17 (28 Apr 2019 06:04) (16 - 17)  SpO2: 100% (28 Apr 2019 06:04) (98% - 100%)    PE:   Rt knee:  skin pink and warm  no open lesions  swelling noted over the rt tibial plateau (med + lat)  compartments soft  swelling improved since thursday  no ct   calve soft  2+ pulses   nvi silt  5/5 strength of ehl/ta/gs b/l.    A: female with rt tibial plateau fx  P:  - pain management:    > Toradol IV prn (ordered)    > dilaudid po prn pain (ordered)  - icepacks RLE/elevation  - dvt ppx with lovenox  - daily pt: nwb of RLE  - pt will be ready for dc in am. Pain to be better managed with PO dilaudid  - case d/w pearl elmore  - Appt for Dr Hazel (ortho) set up for outpt visit at 11am

## 2019-04-29 ENCOUNTER — TRANSCRIPTION ENCOUNTER (OUTPATIENT)
Age: 49
End: 2019-04-29

## 2019-04-29 LAB
ANION GAP SERPL CALC-SCNC: 6 MMOL/L — SIGNIFICANT CHANGE UP (ref 5–17)
BUN SERPL-MCNC: 9 MG/DL — SIGNIFICANT CHANGE UP (ref 7–18)
CALCIUM SERPL-MCNC: 8.6 MG/DL — SIGNIFICANT CHANGE UP (ref 8.4–10.5)
CHLORIDE SERPL-SCNC: 102 MMOL/L — SIGNIFICANT CHANGE UP (ref 96–108)
CO2 SERPL-SCNC: 26 MMOL/L — SIGNIFICANT CHANGE UP (ref 22–31)
CREAT SERPL-MCNC: 0.61 MG/DL — SIGNIFICANT CHANGE UP (ref 0.5–1.3)
GLUCOSE BLDC GLUCOMTR-MCNC: 141 MG/DL — HIGH (ref 70–99)
GLUCOSE BLDC GLUCOMTR-MCNC: 199 MG/DL — HIGH (ref 70–99)
GLUCOSE BLDC GLUCOMTR-MCNC: 258 MG/DL — HIGH (ref 70–99)
GLUCOSE BLDC GLUCOMTR-MCNC: 260 MG/DL — HIGH (ref 70–99)
GLUCOSE SERPL-MCNC: 274 MG/DL — HIGH (ref 70–99)
HCT VFR BLD CALC: 35.2 % — SIGNIFICANT CHANGE UP (ref 34.5–45)
HGB BLD-MCNC: 11.2 G/DL — LOW (ref 11.5–15.5)
MCHC RBC-ENTMCNC: 29.5 PG — SIGNIFICANT CHANGE UP (ref 27–34)
MCHC RBC-ENTMCNC: 31.8 GM/DL — LOW (ref 32–36)
MCV RBC AUTO: 92.6 FL — SIGNIFICANT CHANGE UP (ref 80–100)
NRBC # BLD: 0 /100 WBCS — SIGNIFICANT CHANGE UP (ref 0–0)
PLATELET # BLD AUTO: 385 K/UL — SIGNIFICANT CHANGE UP (ref 150–400)
POTASSIUM SERPL-MCNC: 4 MMOL/L — SIGNIFICANT CHANGE UP (ref 3.5–5.3)
POTASSIUM SERPL-SCNC: 4 MMOL/L — SIGNIFICANT CHANGE UP (ref 3.5–5.3)
RBC # BLD: 3.8 M/UL — SIGNIFICANT CHANGE UP (ref 3.8–5.2)
RBC # FLD: 13.7 % — SIGNIFICANT CHANGE UP (ref 10.3–14.5)
SODIUM SERPL-SCNC: 134 MMOL/L — LOW (ref 135–145)
WBC # BLD: 12.89 K/UL — HIGH (ref 3.8–10.5)
WBC # FLD AUTO: 12.89 K/UL — HIGH (ref 3.8–10.5)

## 2019-04-29 RX ADMIN — HYDROMORPHONE HYDROCHLORIDE 0.5 MILLIGRAM(S): 2 INJECTION INTRAMUSCULAR; INTRAVENOUS; SUBCUTANEOUS at 22:41

## 2019-04-29 RX ADMIN — HYDROMORPHONE HYDROCHLORIDE 0.5 MILLIGRAM(S): 2 INJECTION INTRAMUSCULAR; INTRAVENOUS; SUBCUTANEOUS at 08:55

## 2019-04-29 RX ADMIN — HYDROMORPHONE HYDROCHLORIDE 0.5 MILLIGRAM(S): 2 INJECTION INTRAMUSCULAR; INTRAVENOUS; SUBCUTANEOUS at 17:12

## 2019-04-29 RX ADMIN — ENOXAPARIN SODIUM 40 MILLIGRAM(S): 100 INJECTION SUBCUTANEOUS at 13:11

## 2019-04-29 RX ADMIN — HYDROMORPHONE HYDROCHLORIDE 0.5 MILLIGRAM(S): 2 INJECTION INTRAMUSCULAR; INTRAVENOUS; SUBCUTANEOUS at 13:38

## 2019-04-29 RX ADMIN — Medication 100 MILLIGRAM(S): at 21:29

## 2019-04-29 RX ADMIN — Medication 100 MILLIGRAM(S): at 05:25

## 2019-04-29 RX ADMIN — DEXTROSE MONOHYDRATE, SODIUM CHLORIDE, AND POTASSIUM CHLORIDE 125 MILLILITER(S): 50; .745; 4.5 INJECTION, SOLUTION INTRAVENOUS at 19:12

## 2019-04-29 RX ADMIN — Medication 100 MILLIGRAM(S): at 13:11

## 2019-04-29 RX ADMIN — Medication 2: at 17:03

## 2019-04-29 RX ADMIN — FAMOTIDINE 20 MILLIGRAM(S): 10 INJECTION INTRAVENOUS at 17:04

## 2019-04-29 RX ADMIN — HYDROMORPHONE HYDROCHLORIDE 0.5 MILLIGRAM(S): 2 INJECTION INTRAMUSCULAR; INTRAVENOUS; SUBCUTANEOUS at 17:27

## 2019-04-29 RX ADMIN — HYDROMORPHONE HYDROCHLORIDE 0.5 MILLIGRAM(S): 2 INJECTION INTRAMUSCULAR; INTRAVENOUS; SUBCUTANEOUS at 22:21

## 2019-04-29 RX ADMIN — HYDROMORPHONE HYDROCHLORIDE 0.5 MILLIGRAM(S): 2 INJECTION INTRAMUSCULAR; INTRAVENOUS; SUBCUTANEOUS at 13:23

## 2019-04-29 RX ADMIN — HYDROMORPHONE HYDROCHLORIDE 0.5 MILLIGRAM(S): 2 INJECTION INTRAMUSCULAR; INTRAVENOUS; SUBCUTANEOUS at 04:13

## 2019-04-29 RX ADMIN — FAMOTIDINE 20 MILLIGRAM(S): 10 INJECTION INTRAVENOUS at 05:25

## 2019-04-29 RX ADMIN — Medication 20 MILLIGRAM(S): at 13:11

## 2019-04-29 RX ADMIN — Medication 6: at 08:36

## 2019-04-29 RX ADMIN — HYDROMORPHONE HYDROCHLORIDE 0.5 MILLIGRAM(S): 2 INJECTION INTRAMUSCULAR; INTRAVENOUS; SUBCUTANEOUS at 08:40

## 2019-04-29 RX ADMIN — HYDROMORPHONE HYDROCHLORIDE 0.5 MILLIGRAM(S): 2 INJECTION INTRAMUSCULAR; INTRAVENOUS; SUBCUTANEOUS at 04:43

## 2019-04-29 NOTE — PROGRESS NOTE ADULT - SUBJECTIVE AND OBJECTIVE BOX
48yFemale    Diagnosis:  Right Tibial Plateau Fx    Patient was seen and evaluated at bedside. Patient states she had taken off right knee immobilizer and bulky lo dressing because it was irritating her skin. She has not ambulated since taking it off. States pain is moderate.   Denies CP/SOB, dyspnea, paresthesias, N/V/D, palpitations.     Vital Signs Last 24 Hrs  T(C): 36.8 (29 Apr 2019 06:24), Max: 37.2 (28 Apr 2019 22:23)  T(F): 98.2 (29 Apr 2019 06:24), Max: 98.9 (28 Apr 2019 22:23)  HR: 70 (29 Apr 2019 06:24) (67 - 76)  BP: 105/61 (29 Apr 2019 06:24) (100/49 - 105/61)  BP(mean): --  RR: 16 (29 Apr 2019 06:24) (16 - 17)  SpO2: 97% (29 Apr 2019 06:24) (97% - 99%)  I&O's Detail      Physical Exam:    General: AAOx3, NAD, resting comfortably in bed.    R KNEE:  Immobilizer and dressing off before exam. Skin intact. Ecchymosis at lateral/anterior/medial of right Knee. Swelling noted. Sensation intact. Calves soft and NT. No signs of compartment syndrome.  Lower extremity:  No calf tenderness, calves are soft. 2+pulses. NVI.   Good capillary refill.                           11.2   12.89 )-----------( 385      ( 29 Apr 2019 07:51 )             35.2     04-29    134<L>  |  102  |  9   ----------------------------<  274<H>  4.0   |  26  |  0.61    Ca    8.6      29 Apr 2019 07:51        Impression:  48yFemale Right Tibial Plateau Fx  Plan:  -  Pain management  -  Dvt prophylaxis with Lovenox  -  Daily Physical Theapy:  NWB of right lower extremity  -  Continue with heel bump when laying in bed  -  Discharge planning: Home   -  Reapplied bulky lo dressing and knee immobilizer. Discussed with patient importance of keeping knee immobilizer on with elevation. She verbalizes understanding.  -  Patient has scheduled appointment with Dr. Hazel for right tibial plateau fracture

## 2019-04-30 ENCOUNTER — APPOINTMENT (OUTPATIENT)
Dept: ORTHOPEDIC SURGERY | Facility: CLINIC | Age: 49
End: 2019-04-30

## 2019-04-30 ENCOUNTER — INPATIENT (INPATIENT)
Facility: HOSPITAL | Age: 49
LOS: 5 days | Discharge: HOME | DRG: 493 | End: 2019-05-06
Attending: INTERNAL MEDICINE | Admitting: STUDENT IN AN ORGANIZED HEALTH CARE EDUCATION/TRAINING PROGRAM
Payer: MEDICARE

## 2019-04-30 ENCOUNTER — TRANSCRIPTION ENCOUNTER (OUTPATIENT)
Age: 49
End: 2019-04-30

## 2019-04-30 VITALS
OXYGEN SATURATION: 98 % | HEIGHT: 63 IN | RESPIRATION RATE: 18 BRPM | DIASTOLIC BLOOD PRESSURE: 68 MMHG | WEIGHT: 167.99 LBS | HEART RATE: 88 BPM | TEMPERATURE: 98 F | SYSTOLIC BLOOD PRESSURE: 105 MMHG

## 2019-04-30 VITALS
RESPIRATION RATE: 17 BRPM | DIASTOLIC BLOOD PRESSURE: 60 MMHG | OXYGEN SATURATION: 100 % | HEART RATE: 70 BPM | TEMPERATURE: 99 F | SYSTOLIC BLOOD PRESSURE: 102 MMHG

## 2019-04-30 DIAGNOSIS — M25.571 PAIN IN RIGHT ANKLE AND JOINTS OF RIGHT FOOT: Chronic | ICD-10-CM

## 2019-04-30 DIAGNOSIS — Z98.890 OTHER SPECIFIED POSTPROCEDURAL STATES: Chronic | ICD-10-CM

## 2019-04-30 DIAGNOSIS — Z98.89 OTHER SPECIFIED POSTPROCEDURAL STATES: Chronic | ICD-10-CM

## 2019-04-30 DIAGNOSIS — S82.141A DISPLACED BICONDYLAR FRACTURE OF RIGHT TIBIA, INITIAL ENCOUNTER FOR CLOSED FRACTURE: ICD-10-CM

## 2019-04-30 DIAGNOSIS — Z98.891 HISTORY OF UTERINE SCAR FROM PREVIOUS SURGERY: Chronic | ICD-10-CM

## 2019-04-30 DIAGNOSIS — N60.02 SOLITARY CYST OF LEFT BREAST: Chronic | ICD-10-CM

## 2019-04-30 DIAGNOSIS — Z98.51 TUBAL LIGATION STATUS: Chronic | ICD-10-CM

## 2019-04-30 LAB
ALBUMIN SERPL ELPH-MCNC: 3.1 G/DL — LOW (ref 3.3–5)
ALP SERPL-CCNC: 106 U/L — SIGNIFICANT CHANGE UP (ref 40–120)
ALT FLD-CCNC: 28 U/L DA — SIGNIFICANT CHANGE UP (ref 10–45)
ANION GAP SERPL CALC-SCNC: 7 MMOL/L — SIGNIFICANT CHANGE UP (ref 5–17)
APTT BLD: 22.9 SEC — LOW (ref 27.5–36.3)
AST SERPL-CCNC: 35 U/L — SIGNIFICANT CHANGE UP (ref 10–40)
BASOPHILS # BLD AUTO: 0.1 K/UL — SIGNIFICANT CHANGE UP (ref 0–0.2)
BASOPHILS NFR BLD AUTO: 1 % — SIGNIFICANT CHANGE UP (ref 0–2)
BILIRUB SERPL-MCNC: 0.6 MG/DL — SIGNIFICANT CHANGE UP (ref 0.2–1.2)
BLD GP AB SCN SERPL QL: SIGNIFICANT CHANGE UP
BUN SERPL-MCNC: 13 MG/DL — SIGNIFICANT CHANGE UP (ref 7–23)
CALCIUM SERPL-MCNC: 9.3 MG/DL — SIGNIFICANT CHANGE UP (ref 8.4–10.5)
CHLORIDE SERPL-SCNC: 100 MMOL/L — SIGNIFICANT CHANGE UP (ref 96–108)
CO2 SERPL-SCNC: 28 MMOL/L — SIGNIFICANT CHANGE UP (ref 22–31)
CREAT SERPL-MCNC: 0.85 MG/DL — SIGNIFICANT CHANGE UP (ref 0.5–1.3)
EOSINOPHIL # BLD AUTO: 0.4 K/UL — SIGNIFICANT CHANGE UP (ref 0–0.5)
EOSINOPHIL NFR BLD AUTO: 3.3 % — SIGNIFICANT CHANGE UP (ref 0–6)
GLUCOSE BLDC GLUCOMTR-MCNC: 133 MG/DL — HIGH (ref 70–99)
GLUCOSE BLDC GLUCOMTR-MCNC: 169 MG/DL — HIGH (ref 70–99)
GLUCOSE BLDC GLUCOMTR-MCNC: 176 MG/DL — HIGH (ref 70–99)
GLUCOSE BLDC GLUCOMTR-MCNC: 234 MG/DL — HIGH (ref 70–99)
GLUCOSE SERPL-MCNC: 153 MG/DL — HIGH (ref 70–99)
HCT VFR BLD CALC: 33.4 % — LOW (ref 34.5–45)
HGB BLD-MCNC: 11.3 G/DL — LOW (ref 11.5–15.5)
INR BLD: 1.04 RATIO — SIGNIFICANT CHANGE UP (ref 0.88–1.16)
LYMPHOCYTES # BLD AUTO: 26.2 % — SIGNIFICANT CHANGE UP (ref 13–44)
LYMPHOCYTES # BLD AUTO: 3 K/UL — SIGNIFICANT CHANGE UP (ref 1–3.3)
MCHC RBC-ENTMCNC: 30.9 PG — SIGNIFICANT CHANGE UP (ref 27–34)
MCHC RBC-ENTMCNC: 33.7 GM/DL — SIGNIFICANT CHANGE UP (ref 32–36)
MCV RBC AUTO: 91.6 FL — SIGNIFICANT CHANGE UP (ref 80–100)
MONOCYTES # BLD AUTO: 0.7 K/UL — SIGNIFICANT CHANGE UP (ref 0–0.9)
MONOCYTES NFR BLD AUTO: 6 % — SIGNIFICANT CHANGE UP (ref 2–14)
NEUTROPHILS # BLD AUTO: 7.2 K/UL — SIGNIFICANT CHANGE UP (ref 1.8–7.4)
NEUTROPHILS NFR BLD AUTO: 63.6 % — SIGNIFICANT CHANGE UP (ref 43–77)
PLAT MORPH BLD: NORMAL — SIGNIFICANT CHANGE UP
PLATELET # BLD AUTO: 382 K/UL — SIGNIFICANT CHANGE UP (ref 150–400)
POTASSIUM SERPL-MCNC: 4.6 MMOL/L — SIGNIFICANT CHANGE UP (ref 3.5–5.3)
POTASSIUM SERPL-SCNC: 4.6 MMOL/L — SIGNIFICANT CHANGE UP (ref 3.5–5.3)
PROT SERPL-MCNC: 7.5 G/DL — SIGNIFICANT CHANGE UP (ref 6–8.3)
PROTHROM AB SERPL-ACNC: 11.7 SEC — SIGNIFICANT CHANGE UP (ref 10–12.9)
RBC # BLD: 3.65 M/UL — LOW (ref 3.8–5.2)
RBC # FLD: 12.8 % — SIGNIFICANT CHANGE UP (ref 10.3–14.5)
RBC BLD AUTO: NORMAL — SIGNIFICANT CHANGE UP
SODIUM SERPL-SCNC: 135 MMOL/L — SIGNIFICANT CHANGE UP (ref 135–145)
WBC # BLD: 11.4 K/UL — HIGH (ref 3.8–10.5)
WBC # FLD AUTO: 11.4 K/UL — HIGH (ref 3.8–10.5)

## 2019-04-30 PROCEDURE — 86850 RBC ANTIBODY SCREEN: CPT

## 2019-04-30 PROCEDURE — 86900 BLOOD TYPING SEROLOGIC ABO: CPT

## 2019-04-30 PROCEDURE — 85610 PROTHROMBIN TIME: CPT

## 2019-04-30 PROCEDURE — 97530 THERAPEUTIC ACTIVITIES: CPT

## 2019-04-30 PROCEDURE — 73700 CT LOWER EXTREMITY W/O DYE: CPT

## 2019-04-30 PROCEDURE — 82550 ASSAY OF CK (CPK): CPT

## 2019-04-30 PROCEDURE — 27536 TREAT KNEE FRACTURE: CPT | Mod: AS,RT

## 2019-04-30 PROCEDURE — 73590 X-RAY EXAM OF LOWER LEG: CPT

## 2019-04-30 PROCEDURE — 93010 ELECTROCARDIOGRAM REPORT: CPT

## 2019-04-30 PROCEDURE — 73721 MRI JNT OF LWR EXTRE W/O DYE: CPT

## 2019-04-30 PROCEDURE — 36415 COLL VENOUS BLD VENIPUNCTURE: CPT

## 2019-04-30 PROCEDURE — 82962 GLUCOSE BLOOD TEST: CPT

## 2019-04-30 PROCEDURE — 86901 BLOOD TYPING SEROLOGIC RH(D): CPT

## 2019-04-30 PROCEDURE — 93005 ELECTROCARDIOGRAM TRACING: CPT

## 2019-04-30 PROCEDURE — 81003 URINALYSIS AUTO W/O SCOPE: CPT

## 2019-04-30 PROCEDURE — 80053 COMPREHEN METABOLIC PANEL: CPT

## 2019-04-30 PROCEDURE — 97110 THERAPEUTIC EXERCISES: CPT

## 2019-04-30 PROCEDURE — 71045 X-RAY EXAM CHEST 1 VIEW: CPT | Mod: 26

## 2019-04-30 PROCEDURE — 96372 THER/PROPH/DIAG INJ SC/IM: CPT | Mod: XU

## 2019-04-30 PROCEDURE — 80048 BASIC METABOLIC PNL TOTAL CA: CPT

## 2019-04-30 PROCEDURE — 85027 COMPLETE CBC AUTOMATED: CPT

## 2019-04-30 PROCEDURE — 99285 EMERGENCY DEPT VISIT HI MDM: CPT | Mod: 25

## 2019-04-30 PROCEDURE — 85730 THROMBOPLASTIN TIME PARTIAL: CPT

## 2019-04-30 PROCEDURE — 27536 TREAT KNEE FRACTURE: CPT | Mod: RT

## 2019-04-30 PROCEDURE — 99285 EMERGENCY DEPT VISIT HI MDM: CPT

## 2019-04-30 PROCEDURE — 73562 X-RAY EXAM OF KNEE 3: CPT | Mod: 26,RT

## 2019-04-30 PROCEDURE — 99223 1ST HOSP IP/OBS HIGH 75: CPT

## 2019-04-30 PROCEDURE — 73562 X-RAY EXAM OF KNEE 3: CPT

## 2019-04-30 PROCEDURE — 29505 APPLICATION LONG LEG SPLINT: CPT

## 2019-04-30 PROCEDURE — 97161 PT EVAL LOW COMPLEX 20 MIN: CPT

## 2019-04-30 RX ORDER — ACETAMINOPHEN 500 MG
650 TABLET ORAL EVERY 6 HOURS
Qty: 0 | Refills: 0 | Status: DISCONTINUED | OUTPATIENT
Start: 2019-04-30 | End: 2019-04-30

## 2019-04-30 RX ORDER — GLUCAGON INJECTION, SOLUTION 0.5 MG/.1ML
1 INJECTION, SOLUTION SUBCUTANEOUS ONCE
Qty: 0 | Refills: 0 | Status: DISCONTINUED | OUTPATIENT
Start: 2019-04-30 | End: 2019-04-30

## 2019-04-30 RX ORDER — OXYCODONE HYDROCHLORIDE 5 MG/1
10 TABLET ORAL
Qty: 0 | Refills: 0 | Status: DISCONTINUED | OUTPATIENT
Start: 2019-04-30 | End: 2019-05-06

## 2019-04-30 RX ORDER — CEFAZOLIN SODIUM 1 G
2000 VIAL (EA) INJECTION EVERY 8 HOURS
Qty: 0 | Refills: 0 | Status: COMPLETED | OUTPATIENT
Start: 2019-05-01 | End: 2019-05-01

## 2019-04-30 RX ORDER — GLUCAGON INJECTION, SOLUTION 0.5 MG/.1ML
1 INJECTION, SOLUTION SUBCUTANEOUS ONCE
Qty: 0 | Refills: 0 | Status: DISCONTINUED | OUTPATIENT
Start: 2019-04-30 | End: 2019-05-06

## 2019-04-30 RX ORDER — INSULIN LISPRO 100/ML
VIAL (ML) SUBCUTANEOUS AT BEDTIME
Qty: 0 | Refills: 0 | Status: DISCONTINUED | OUTPATIENT
Start: 2019-04-30 | End: 2019-04-30

## 2019-04-30 RX ORDER — HYDROMORPHONE HYDROCHLORIDE 2 MG/ML
0.5 INJECTION INTRAMUSCULAR; INTRAVENOUS; SUBCUTANEOUS
Qty: 0 | Refills: 0 | Status: DISCONTINUED | OUTPATIENT
Start: 2019-04-30 | End: 2019-05-06

## 2019-04-30 RX ORDER — DEXTROSE 50 % IN WATER 50 %
15 SYRINGE (ML) INTRAVENOUS ONCE
Qty: 0 | Refills: 0 | Status: DISCONTINUED | OUTPATIENT
Start: 2019-04-30 | End: 2019-04-30

## 2019-04-30 RX ORDER — DEXTROSE 50 % IN WATER 50 %
12.5 SYRINGE (ML) INTRAVENOUS ONCE
Qty: 0 | Refills: 0 | Status: DISCONTINUED | OUTPATIENT
Start: 2019-04-30 | End: 2019-04-30

## 2019-04-30 RX ORDER — SODIUM CHLORIDE 9 MG/ML
1000 INJECTION, SOLUTION INTRAVENOUS
Qty: 0 | Refills: 0 | Status: DISCONTINUED | OUTPATIENT
Start: 2019-04-30 | End: 2019-05-02

## 2019-04-30 RX ORDER — ATORVASTATIN CALCIUM 80 MG/1
10 TABLET, FILM COATED ORAL AT BEDTIME
Qty: 0 | Refills: 0 | Status: DISCONTINUED | OUTPATIENT
Start: 2019-04-30 | End: 2019-04-30

## 2019-04-30 RX ORDER — INSULIN LISPRO 100/ML
VIAL (ML) SUBCUTANEOUS
Qty: 0 | Refills: 0 | Status: DISCONTINUED | OUTPATIENT
Start: 2019-04-30 | End: 2019-04-30

## 2019-04-30 RX ORDER — SODIUM CHLORIDE 9 MG/ML
1000 INJECTION, SOLUTION INTRAVENOUS
Qty: 0 | Refills: 0 | Status: DISCONTINUED | OUTPATIENT
Start: 2019-04-30 | End: 2019-04-30

## 2019-04-30 RX ORDER — ONDANSETRON 8 MG/1
4 TABLET, FILM COATED ORAL ONCE
Qty: 0 | Refills: 0 | Status: DISCONTINUED | OUTPATIENT
Start: 2019-04-30 | End: 2019-04-30

## 2019-04-30 RX ORDER — OXYCODONE HYDROCHLORIDE 5 MG/1
5 TABLET ORAL
Qty: 0 | Refills: 0 | Status: DISCONTINUED | OUTPATIENT
Start: 2019-04-30 | End: 2019-05-06

## 2019-04-30 RX ORDER — DEXTROSE 50 % IN WATER 50 %
25 SYRINGE (ML) INTRAVENOUS ONCE
Qty: 0 | Refills: 0 | Status: DISCONTINUED | OUTPATIENT
Start: 2019-04-30 | End: 2019-04-30

## 2019-04-30 RX ORDER — HYDROMORPHONE HYDROCHLORIDE 2 MG/ML
0.5 INJECTION INTRAMUSCULAR; INTRAVENOUS; SUBCUTANEOUS
Qty: 0 | Refills: 0 | Status: DISCONTINUED | OUTPATIENT
Start: 2019-04-30 | End: 2019-04-30

## 2019-04-30 RX ORDER — INSULIN LISPRO 100/ML
VIAL (ML) SUBCUTANEOUS
Qty: 0 | Refills: 0 | Status: DISCONTINUED | OUTPATIENT
Start: 2019-04-30 | End: 2019-05-06

## 2019-04-30 RX ORDER — INSULIN LISPRO 100/ML
VIAL (ML) SUBCUTANEOUS AT BEDTIME
Qty: 0 | Refills: 0 | Status: DISCONTINUED | OUTPATIENT
Start: 2019-04-30 | End: 2019-05-06

## 2019-04-30 RX ORDER — DEXTROSE 50 % IN WATER 50 %
15 SYRINGE (ML) INTRAVENOUS ONCE
Qty: 0 | Refills: 0 | Status: DISCONTINUED | OUTPATIENT
Start: 2019-04-30 | End: 2019-05-06

## 2019-04-30 RX ORDER — ATORVASTATIN CALCIUM 80 MG/1
10 TABLET, FILM COATED ORAL AT BEDTIME
Qty: 0 | Refills: 0 | Status: DISCONTINUED | OUTPATIENT
Start: 2019-04-30 | End: 2019-05-06

## 2019-04-30 RX ORDER — ENOXAPARIN SODIUM 100 MG/ML
40 INJECTION SUBCUTANEOUS DAILY
Qty: 0 | Refills: 0 | Status: DISCONTINUED | OUTPATIENT
Start: 2019-05-01 | End: 2019-05-06

## 2019-04-30 RX ORDER — INFLUENZA VIRUS VACCINE 15; 15; 15; 15 UG/.5ML; UG/.5ML; UG/.5ML; UG/.5ML
0.5 SUSPENSION INTRAMUSCULAR ONCE
Qty: 0 | Refills: 0 | Status: DISCONTINUED | OUTPATIENT
Start: 2019-04-30 | End: 2019-05-06

## 2019-04-30 RX ORDER — DEXTROSE 50 % IN WATER 50 %
25 SYRINGE (ML) INTRAVENOUS ONCE
Qty: 0 | Refills: 0 | Status: DISCONTINUED | OUTPATIENT
Start: 2019-04-30 | End: 2019-05-06

## 2019-04-30 RX ORDER — DEXTROSE 50 % IN WATER 50 %
12.5 SYRINGE (ML) INTRAVENOUS ONCE
Qty: 0 | Refills: 0 | Status: DISCONTINUED | OUTPATIENT
Start: 2019-04-30 | End: 2019-05-06

## 2019-04-30 RX ADMIN — HYDROMORPHONE HYDROCHLORIDE 0.5 MILLIGRAM(S): 2 INJECTION INTRAMUSCULAR; INTRAVENOUS; SUBCUTANEOUS at 03:43

## 2019-04-30 RX ADMIN — HYDROMORPHONE HYDROCHLORIDE 0.5 MILLIGRAM(S): 2 INJECTION INTRAMUSCULAR; INTRAVENOUS; SUBCUTANEOUS at 19:10

## 2019-04-30 RX ADMIN — FAMOTIDINE 20 MILLIGRAM(S): 10 INJECTION INTRAVENOUS at 05:39

## 2019-04-30 RX ADMIN — Medication 100 MILLIGRAM(S): at 05:39

## 2019-04-30 RX ADMIN — DEXTROSE MONOHYDRATE, SODIUM CHLORIDE, AND POTASSIUM CHLORIDE 125 MILLILITER(S): 50; .745; 4.5 INJECTION, SOLUTION INTRAVENOUS at 05:39

## 2019-04-30 RX ADMIN — SODIUM CHLORIDE 100 MILLILITER(S): 9 INJECTION, SOLUTION INTRAVENOUS at 21:27

## 2019-04-30 RX ADMIN — HYDROMORPHONE HYDROCHLORIDE 0.5 MILLIGRAM(S): 2 INJECTION INTRAMUSCULAR; INTRAVENOUS; SUBCUTANEOUS at 04:13

## 2019-04-30 RX ADMIN — Medication 15 MILLIGRAM(S): at 11:19

## 2019-04-30 RX ADMIN — HYDROMORPHONE HYDROCHLORIDE 0.5 MILLIGRAM(S): 2 INJECTION INTRAMUSCULAR; INTRAVENOUS; SUBCUTANEOUS at 08:05

## 2019-04-30 RX ADMIN — HYDROMORPHONE HYDROCHLORIDE 0.5 MILLIGRAM(S): 2 INJECTION INTRAMUSCULAR; INTRAVENOUS; SUBCUTANEOUS at 07:52

## 2019-04-30 RX ADMIN — Medication 2: at 07:51

## 2019-04-30 RX ADMIN — HYDROMORPHONE HYDROCHLORIDE 0.5 MILLIGRAM(S): 2 INJECTION INTRAMUSCULAR; INTRAVENOUS; SUBCUTANEOUS at 19:20

## 2019-04-30 RX ADMIN — ATORVASTATIN CALCIUM 10 MILLIGRAM(S): 80 TABLET, FILM COATED ORAL at 21:23

## 2019-04-30 NOTE — ED ADULT NURSE NOTE - CHIEF COMPLAINT QUOTE
As per EMS, pt transferred from [facility]; fall 10 days ago resulting in tib/fib fracture; sent to  for surgery.

## 2019-04-30 NOTE — ED PROVIDER NOTE - OBJECTIVE STATEMENT
48 yr old female with hx of DM, HLD, depression transferred from [place] due to right tibia plateau fracture. Pt private orthopedic- Dr. Nicholson, sent to [facility] to go to [place]. Pt reports slipping and falling about 1 week ago at her house, landing on knee.

## 2019-04-30 NOTE — H&P ADULT - HISTORY OF PRESENT ILLNESS
Pt is a 48 y o M PMH DMII, HLD, depression presented with fall found to have right tibial plateau fracture sent to [place] to have surgery with .

## 2019-04-30 NOTE — ED PROVIDER NOTE - CLINICAL SUMMARY MEDICAL DECISION MAKING FREE TEXT BOX
48 yr old female with hx of DM, HLD, depression transferred from [place] due to right tibia plateau fracture. Pt private orthopedic- Dr. Nicholson, sent to [facility] to go to OR. Pt reports slipping and falling about 1 week ago at her house, landing on knee.    pre ops- EKG, chest xray- admit to medicine

## 2019-04-30 NOTE — ED ADULT NURSE REASSESSMENT NOTE - NS ED NURSE REASSESS COMMENT FT1
UCG done and negative, chlorhexidine bath preformed. Report given to OR nurse Heidi. pt vital signs stable and waiting pt to go to OR. UCG done and negative, chlorhexidine bath preformed. Report given to OR nurse Heidi. New IV placed on top of right hand 20g, dry and intact, flushing well. pt vital signs stable and waiting pt to go to OR.

## 2019-04-30 NOTE — DISCHARGE NOTE NURSING/CASE MANAGEMENT/SOCIAL WORK - NSDCFUADDAPPT_GEN_ALL_CORE_FT
4/30/2019  11am  Dr Hazel appointment  Orthopedics    Location:   [address] [facility] [address] [facility] [address] [facility]  [address]  [place], [place]    CD Xrays/imaging given to pt.

## 2019-04-30 NOTE — ED PROVIDER NOTE - ATTENDING CONTRIBUTION TO CARE
· HPI Objective Statement: 48 yr old female with hx of DM, HLD, depression transferred from [place] due to right tibia plateau fracture. Pt private orthopedic- Dr. Nicholson, sent to [facility] to go to OR. Pt reports slipping and falling about 1 week ago at her house, landing on knee.   pr R LL + knee immobilizer knee Leg tender painful rom , neuro vs intact  Dr. Lindsay:  I have reviewed and discussed with the PA/ resident the case specifics, including the history, physical assessment, evaluation, conclusion, laboratory results, and medical plan. I agree with the contents, and conclusions. I have personally examined, and interviewed the patient.

## 2019-04-30 NOTE — ED ADULT NURSE NOTE - OBJECTIVE STATEMENT
pt transferred from [facility]. pt states she had fall 10 days ago resulting in tib/fib fracture and was sent to  for surgery today by MD see. pt presents with RLE pain/swelling. Pt denies any n/v/d, chest pain, SOB, blurred vision, headache, dizziness, fever, chills or any other complaint at this time. pt transferred from [facility]. pt states she had fall 10 days ago resulting in tib/fib fracture and was sent to  for surgery today by MD see. pt presents with RLE pain/swelling. pt denies any blood thinners. Pt states she last ate at 8am a cup of coffee and slice of bread. and  Pt denies any n/v/d, chest pain, SOB, blurred vision, headache, dizziness, fever, chills or any other complaint at this time.

## 2019-04-30 NOTE — H&P ADULT - NSHPLABSRESULTS_GEN_ALL_CORE
11.3   11.4  )-----------( 382      ( 30 Apr 2019 12:50 )             33.4     04-30    135  |  100  |  13  ----------------------------<  153<H>  4.6   |  28  |  0.85    Ca    9.3      30 Apr 2019 12:50    TPro  7.5  /  Alb  3.1<L>  /  TBili  0.6  /  DBili  x   /  AST  35  /  ALT  28  /  AlkPhos  106  04-30    < from: Xray Chest 1 View AP/PA (04.30.19 @ 12:45) >      EXAM:  XR CHEST AP OR PA 1V      PROCEDURE DATE:  04/30/2019        INTERPRETATION:  Views:1  Comparison: Unavailable at this time  History: Injury    The lungs are clear of infiltrates and effusions. The cardiac and   mediastinal contours appear unremarkable.     Impression:  1. No evidence of acute pulmonary disease.    < end of copied text >

## 2019-04-30 NOTE — H&P ADULT - ASSESSMENT
Pt is a 48 y o M presented with fall admitted for right tibial plateau fracture to go to OR with Dr. Hazel.    #Right tibial plateau fracture: OR with     #Pre-op eval: pt can proceed to OR without further cardiac testing    #DMII: SSI    #DVT PPX: Lovenox    CAPRINI SCORE [CLOT]    AGE RELATED RISK FACTORS                                                       MOBILITY RELATED FACTORS  [x ] Age 41-60 years                                            (1 Point)                  [ ] Bed rest                                                        (1 Point)  [ ] Age: 61-74 years                                           (2 Points)                 [ ] Plaster cast                                                   (2 Points)  [ ] Age= 75 years                                              (3 Points)                 [ ] Bed bound for more than 72 hours                 (2 Points)    DISEASE RELATED RISK FACTORS                                               GENDER SPECIFIC FACTORS  [ ] Edema in the lower extremities                       (1 Point)                  [ ] Pregnancy                                                     (1 Point)  [ ] Varicose veins                                               (1 Point)                  [ ] Post-partum < 6 weeks                                   (1 Point)             [ ] BMI > 25 Kg/m2                                            (1 Point)                  [ ] Hormonal therapy  or oral contraception          (1 Point)                 [ ] Sepsis (in the previous month)                        (1 Point)                  [ ] History of pregnancy complications                 (1 point)  [ ] Pneumonia or serious lung disease                                               [ ] Unexplained or recurrent                     (1 Point)           (in the previous month)                               (1 Point)  [ ] Abnormal pulmonary function test                     (1 Point)                 SURGERY RELATED RISK FACTORS  [ ] Acute myocardial infarction                              (1 Point)                 [ ]  Section                                             (1 Point)  [ ] Congestive heart failure (in the previous month)  (1 Point)               [ ] Minor surgery                                                  (1 Point)   [ ] Inflammatory bowel disease                             (1 Point)                 [ ] Arthroscopic surgery                                        (2 Points)  [ ] Central venous access                                      (2 Points)                [ ] General surgery lasting more than 45 minutes   (2 Points)       [ ] Stroke (in the previous month)                          (5 Points)               [ ] Elective arthroplasty                                         (5 Points)                                                                                                                                               HEMATOLOGY RELATED FACTORS                                                 TRAUMA RELATED RISK FACTORS  [ ] Prior episodes of VTE                                     (3 Points)                 [x ] Fracture of the hip, pelvis, or leg                       (5 Points)  [ ] Positive family history for VTE                         (3 Points)                 [ ] Acute spinal cord injury (in the previous month)  (5 Points)  [ ] Prothrombin 72504 A                                     (3 Points)                 [ ] Paralysis  (less than 1 month)                             (5 Points)  [ ] Factor V Leiden                                             (3 Points)                  [ ] Multiple Trauma within 1 month                        (5 Points)  [ ] Lupus anticoagulants                                     (3 Points)                                                           [ ] Anticardiolipin antibodies                               (3 Points)                                                       [ ] High homocysteine in the blood                      (3 Points)                                             [ ] Other congenital or acquired thrombophilia      (3 Points)                                                [ ] Heparin induced thrombocytopenia                  (3 Points)                                          Total Score [      6    ] Pt is a 48 y o M presented with fall admitted for right tibial plateau fracture to go to OR with Dr. Hazel.    #Right tibial plateau fracture: OR with     #Pre-op eval: pt can proceed to OR without further cardiac testing    #DMII: SSI    #HLD: continue statin    #DVT PPX: Lovenox    CAPRINI SCORE [CLOT]    AGE RELATED RISK FACTORS                                                       MOBILITY RELATED FACTORS  [x ] Age 41-60 years                                            (1 Point)                  [ ] Bed rest                                                        (1 Point)  [ ] Age: 61-74 years                                           (2 Points)                 [ ] Plaster cast                                                   (2 Points)  [ ] Age= 75 years                                              (3 Points)                 [ ] Bed bound for more than 72 hours                 (2 Points)    DISEASE RELATED RISK FACTORS                                               GENDER SPECIFIC FACTORS  [ ] Edema in the lower extremities                       (1 Point)                  [ ] Pregnancy                                                     (1 Point)  [ ] Varicose veins                                               (1 Point)                  [ ] Post-partum < 6 weeks                                   (1 Point)             [x ] BMI > 25 Kg/m2                                            (1 Point)                  [ ] Hormonal therapy  or oral contraception          (1 Point)                 [ ] Sepsis (in the previous month)                        (1 Point)                  [ ] History of pregnancy complications                 (1 point)  [ ] Pneumonia or serious lung disease                                               [ ] Unexplained or recurrent                     (1 Point)           (in the previous month)                               (1 Point)  [ ] Abnormal pulmonary function test                     (1 Point)                 SURGERY RELATED RISK FACTORS  [ ] Acute myocardial infarction                              (1 Point)                 [ ]  Section                                             (1 Point)  [ ] Congestive heart failure (in the previous month)  (1 Point)               [ ] Minor surgery                                                  (1 Point)   [ ] Inflammatory bowel disease                             (1 Point)                 [ ] Arthroscopic surgery                                        (2 Points)  [ ] Central venous access                                      (2 Points)                [ ] General surgery lasting more than 45 minutes   (2 Points)       [ ] Stroke (in the previous month)                          (5 Points)               [ ] Elective arthroplasty                                         (5 Points)                                                                                                                                               HEMATOLOGY RELATED FACTORS                                                 TRAUMA RELATED RISK FACTORS  [ ] Prior episodes of VTE                                     (3 Points)                 [x ] Fracture of the hip, pelvis, or leg                       (5 Points)  [ ] Positive family history for VTE                         (3 Points)                 [ ] Acute spinal cord injury (in the previous month)  (5 Points)  [ ] Prothrombin 35734 A                                     (3 Points)                 [ ] Paralysis  (less than 1 month)                             (5 Points)  [ ] Factor V Leiden                                             (3 Points)                  [ ] Multiple Trauma within 1 month                        (5 Points)  [ ] Lupus anticoagulants                                     (3 Points)                                                           [ ] Anticardiolipin antibodies                               (3 Points)                                                       [ ] High homocysteine in the blood                      (3 Points)                                             [ ] Other congenital or acquired thrombophilia      (3 Points)                                                [ ] Heparin induced thrombocytopenia                  (3 Points)                                          Total Score [     7    ]

## 2019-04-30 NOTE — ED ADULT NURSE NOTE - NSIMPLEMENTINTERV_GEN_ALL_ED
Implemented All Fall Risk Interventions:  [place] to call system. Call bell, personal items and telephone within reach. Instruct patient to call for assistance. Room bathroom lighting operational. Non-slip footwear when patient is off stretcher. Physically safe environment: no spills, clutter or unnecessary equipment. Stretcher in lowest position, wheels locked, appropriate side rails in place. Provide visual cue, wrist band, yellow gown, etc. Monitor gait and stability. Monitor for mental status changes and reorient to person, place, and time. Review medications for side effects contributing to fall risk. Reinforce activity limits and safety measures with patient and family.

## 2019-04-30 NOTE — H&P ADULT - NSHPPHYSICALEXAM_GEN_ALL_CORE
T(C): 36.4 (04-30-19 @ 12:28), Max: 36.4 (04-30-19 @ 12:28)  HR: 88 (04-30-19 @ 12:28) (88 - 88)  BP: 105/68 (04-30-19 @ 12:28) (105/68 - 105/68)  RR: 18 (04-30-19 @ 12:28) (18 - 18)  SpO2: 98% (04-30-19 @ 12:28) (98% - 98%)    PHYSICAL EXAM:  GENERAL: NAD, well-groomed, well-developed  HEAD:  Atraumatic, Normocephalic  EYES: EOMI, PERRLA, conjunctiva and sclera clear  ENMT: Moist mucous membranes, Good dentition  NECK: Supple, No JVD  NERVOUS SYSTEM:  A/O x3, Good concentration; CN 2-12 intact, No focal deficits  CHEST/LUNG: Clear to auscultation bilaterally; No rales, rhonchi, wheezing, or rubs  HEART: Regular rate and rhythm; S1/S2, No murmurs, rubs, or gallops  ABDOMEN: Soft, Nontender, Nondistended; Bowel sounds present  VASCULAR: Normal pulses, Normal capillary refill  EXTREMITIES:  2+ Peripheral Pulses, No clubbing, cyanosis, or edema  SKIN: Warm, Intact  PSYCH: Normal mood and affect T(C): 36.4 (04-30-19 @ 12:28), Max: 36.4 (04-30-19 @ 12:28)  HR: 88 (04-30-19 @ 12:28) (88 - 88)  BP: 105/68 (04-30-19 @ 12:28) (105/68 - 105/68)  RR: 18 (04-30-19 @ 12:28) (18 - 18)  SpO2: 98% (04-30-19 @ 12:28) (98% - 98%)    PHYSICAL EXAM:  GENERAL: NAD, well-groomed, well-developed  HEAD:  Atraumatic, Normocephalic  EYES: EOMI, PERRLA, conjunctiva and sclera clear  ENMT: Moist mucous membranes, Good dentition  NECK: Supple, No JVD  NERVOUS SYSTEM:  A/O x3, Good concentration; CN 2-12 intact, No focal deficits  CHEST/LUNG: Clear to auscultation bilaterally; No rales, rhonchi, wheezing, or rubs  HEART: Regular rate and rhythm; S1/S2, No murmurs, rubs, or gallops  ABDOMEN: Soft, Nontender, Nondistended; Bowel sounds present  VASCULAR: Normal pulses, Normal capillary refill  EXTREMITIES:  right leg in brace  SKIN: Warm, Intact  PSYCH: Normal mood and affect

## 2019-04-30 NOTE — BRIEF OPERATIVE NOTE - NSICDXBRIEFPREOP_GEN_ALL_CORE_FT
PRE-OP DIAGNOSIS:  Closed fracture of tibial plateau, right, initial encounter 30-Apr-2019 19:01:39  Hardik Matias

## 2019-04-30 NOTE — BRIEF OPERATIVE NOTE - NSICDXBRIEFPOSTOP_GEN_ALL_CORE_FT
POST-OP DIAGNOSIS:  Closed fracture of right tibial plateau, initial encounter 30-Apr-2019 19:02:47  Hardik Matias

## 2019-04-30 NOTE — ED ADULT TRIAGE NOTE - CHIEF COMPLAINT QUOTE
As per EMS, pt transferred from [facility]; fall 10 days ago resulting in tib/fib fracture; sent to  for surgery.

## 2019-04-30 NOTE — H&P ADULT - NSHPREVIEWOFSYSTEMS_GEN_ALL_CORE
REVIEW OF SYSTEMS:  CONSTITUTIONAL: No fever, weight loss, or fatigue  EYES: No eye pain, visual disturbances, or discharge  ENMT:  No difficulty hearing, tinnitus, vertigo; No sinus or throat pain  NECK: No neck pain or neck stiffness  RESPIRATORY: No cough, wheezing, chills or hemoptysis; No shortness of breath  CARDIOVASCULAR: No chest pain, palpitations, dizziness, or leg swelling  GASTROINTESTINAL: No abdominal pain, No nausea, vomiting, or hematemesis; No diarrhea or constipation  GENITOURINARY: No dysuria, frequency, hematuria, or incontinence  NEUROLOGICAL: No headaches, memory loss, loss of strength, numbness, or tremors  SKIN: No itching, burning, rashes, or lesions   ENDOCRINE: No heat or cold intolerance; No hair loss  MUSCULOSKELETAL: right leg pain  PSYCHIATRIC: No depression, anxiety, mood swings, or difficulty sleeping  HEME/LYMPH: No easy bruising or bleeding  ALLERY AND IMMUNOLOGIC: No hives or eczema    All other ROS reviewed and negative except as otherwise stated

## 2019-04-30 NOTE — ED PROVIDER NOTE - PHYSICAL EXAMINATION
right knee- limited exam- bandage and knee immobilizer placed, decrease ROM due to pain, positive 2+ distal pedal pulses, less than 2 sec

## 2019-04-30 NOTE — ED ADULT NURSE NOTE - EXTENSIONS OF SELF_ADULT
Duration Of Freeze Thaw-Cycle (Seconds): 0 Number Of Freeze-Thaw Cycles: 2 freeze-thaw cycles Post-Care Instructions: I reviewed with the patient in detail post-care instructions. Patient is to wear sunprotection, and avoid picking at any of the treated lesions. Pt may apply Vaseline to crusted or scabbing areas. Include Z78.9 (Other Specified Conditions Influencing Health Status) As An Associated Diagnosis?: No Medical Necessity Information: It is in your best interest to select a reason for this procedure from the list below. All of these items fulfill various CMS LCD requirements except the new and changing color options. Consent: The patient's consent was obtained including but not limited to risks of crusting, scabbing, blistering, scarring, darker or lighter pigmentary change, recurrence, incomplete removal and infection. Render Post-Care Instructions In Note?: yes Detail Level: Detailed Medical Necessity Clause: This procedure was medically necessary because the lesions that were treated were: None

## 2019-04-30 NOTE — DISCHARGE NOTE NURSING/CASE MANAGEMENT/SOCIAL WORK - NSDCDPATPORTLINK_GEN_ALL_CORE
You can access the [facility], offered by [facility], by registering with the following website: [email or web address]

## 2019-04-30 NOTE — PROGRESS NOTE ADULT - SUBJECTIVE AND OBJECTIVE BOX
48yFemale    Diagnosis:  R Tibia plateau fx and proximal fibula fx    Patient was seen and evaluated at bedside. Patient with no acute complaints.   Pain is controlled to the R knee. Pt is awaiting transportation to 's office this morning.   Denies CP/SOB, dyspnea, paresthesias, N/V/D, palpitations.     Vital Signs Last 24 Hrs  T(C): 36.9 (30 Apr 2019 05:42), Max: 37.4 (29 Apr 2019 13:08)  T(F): 98.5 (30 Apr 2019 05:42), Max: 99.3 (29 Apr 2019 13:08)  HR: 73 (30 Apr 2019 05:42) (72 - 73)  BP: 99/44 (30 Apr 2019 05:42) (99/44 - 104/55)  BP(mean): --  RR: 16 (30 Apr 2019 05:42) (16 - 18)  SpO2: 95% (30 Apr 2019 05:42) (95% - 99%)  I&O's Detail    29 Apr 2019 07:01  -  30 Apr 2019 07:00  --------------------------------------------------------  IN:    dextrose 5% + sodium chloride 0.45% with potassium chloride 20 mEq/L: 1500 mL  Total IN: 1500 mL    OUT:  Total OUT: 0 mL    Total NET: 1500 mL          Physical Exam:    General: AAOx3, NAD, resting comfortably in bed.    R KNEE:  Knee immobilizer/Dressing is C/D/I. Skin is pink and warm. No erythema. SILT.   Lower extremity:  No calf tenderness, calves are soft. 2+pulses. NVI. (+)EHL/FHL/ADF/APF.  Good capillary refill.                           11.2   12.89 )-----------( 385      ( 29 Apr 2019 07:51 )             35.2     04-29    134<L>  |  102  |  9   ----------------------------<  274<H>  4.0   |  26  |  0.61    Ca    8.6      29 Apr 2019 07:51        Impression:  48yFemale w/ R Tibia plateau fx and proximal fibula fx  Plan:  -  Pain control  -  Dvt prophylaxis  -  Daily Physical Therapy:  NWB to RLE  -  Discharge planning: Pt to follow up with 's office today at 930 AM, transportation set up- plan to follow- will D.w .   -  C/w Knee Immobilizer to RLE  -  Case d/w DR. Elizabeth

## 2019-04-30 NOTE — ED ADULT NURSE NOTE - PMH
Diabetes mellitus    HLD (hyperlipidemia)    Hypertension Depression    Diabetes mellitus    HLD (hyperlipidemia)

## 2019-04-30 NOTE — H&P ADULT - NSHPSOCIALHISTORY_GEN_ALL_CORE
No alcohol, smoking, or illicits No alcohol, occasional smoking 1-2 cigarrettes socially, no illicits

## 2019-05-01 LAB
ANION GAP SERPL CALC-SCNC: 7 MMOL/L — SIGNIFICANT CHANGE UP (ref 5–17)
BASOPHILS # BLD AUTO: 0.1 K/UL — SIGNIFICANT CHANGE UP (ref 0–0.2)
BASOPHILS NFR BLD AUTO: 0.5 % — SIGNIFICANT CHANGE UP (ref 0–2)
BUN SERPL-MCNC: 15 MG/DL — SIGNIFICANT CHANGE UP (ref 7–23)
CALCIUM SERPL-MCNC: 8.8 MG/DL — SIGNIFICANT CHANGE UP (ref 8.4–10.5)
CHLORIDE SERPL-SCNC: 102 MMOL/L — SIGNIFICANT CHANGE UP (ref 96–108)
CO2 SERPL-SCNC: 29 MMOL/L — SIGNIFICANT CHANGE UP (ref 22–31)
CREAT SERPL-MCNC: 0.71 MG/DL — SIGNIFICANT CHANGE UP (ref 0.5–1.3)
EOSINOPHIL # BLD AUTO: 0 K/UL — SIGNIFICANT CHANGE UP (ref 0–0.5)
EOSINOPHIL NFR BLD AUTO: 0.1 % — SIGNIFICANT CHANGE UP (ref 0–6)
GLUCOSE BLDC GLUCOMTR-MCNC: 182 MG/DL — HIGH (ref 70–99)
GLUCOSE BLDC GLUCOMTR-MCNC: 206 MG/DL — HIGH (ref 70–99)
GLUCOSE BLDC GLUCOMTR-MCNC: 211 MG/DL — HIGH (ref 70–99)
GLUCOSE BLDC GLUCOMTR-MCNC: 266 MG/DL — HIGH (ref 70–99)
GLUCOSE SERPL-MCNC: 238 MG/DL — HIGH (ref 70–99)
HBA1C BLD-MCNC: 7.8 % — HIGH (ref 4–5.6)
HCT VFR BLD CALC: 34.6 % — SIGNIFICANT CHANGE UP (ref 34.5–45)
HGB BLD-MCNC: 10.7 G/DL — LOW (ref 11.5–15.5)
INR BLD: 1.16 RATIO — SIGNIFICANT CHANGE UP (ref 0.88–1.16)
LYMPHOCYTES # BLD AUTO: 1.2 K/UL — SIGNIFICANT CHANGE UP (ref 1–3.3)
LYMPHOCYTES # BLD AUTO: 8.7 % — LOW (ref 13–44)
MCHC RBC-ENTMCNC: 28.5 PG — SIGNIFICANT CHANGE UP (ref 27–34)
MCHC RBC-ENTMCNC: 30.9 GM/DL — LOW (ref 32–36)
MCV RBC AUTO: 92.3 FL — SIGNIFICANT CHANGE UP (ref 80–100)
MONOCYTES # BLD AUTO: 0.7 K/UL — SIGNIFICANT CHANGE UP (ref 0–0.9)
MONOCYTES NFR BLD AUTO: 4.8 % — SIGNIFICANT CHANGE UP (ref 1–9)
NEUTROPHILS # BLD AUTO: 12.2 K/UL — HIGH (ref 1.8–7.4)
NEUTROPHILS NFR BLD AUTO: 86 % — HIGH (ref 43–77)
PLATELET # BLD AUTO: 414 K/UL — HIGH (ref 150–400)
POTASSIUM SERPL-MCNC: 4.5 MMOL/L — SIGNIFICANT CHANGE UP (ref 3.5–5.3)
POTASSIUM SERPL-SCNC: 4.5 MMOL/L — SIGNIFICANT CHANGE UP (ref 3.5–5.3)
PROTHROM AB SERPL-ACNC: 13.1 SEC — HIGH (ref 10–12.9)
RBC # BLD: 3.74 M/UL — LOW (ref 3.8–5.2)
RBC # FLD: 12.6 % — SIGNIFICANT CHANGE UP (ref 10.3–14.5)
SODIUM SERPL-SCNC: 138 MMOL/L — SIGNIFICANT CHANGE UP (ref 135–145)
WBC # BLD: 14.1 K/UL — HIGH (ref 3.8–10.5)
WBC # FLD AUTO: 14.1 K/UL — HIGH (ref 3.8–10.5)

## 2019-05-01 PROCEDURE — 73562 X-RAY EXAM OF KNEE 3: CPT | Mod: 26,RT

## 2019-05-01 PROCEDURE — 93970 EXTREMITY STUDY: CPT | Mod: 26

## 2019-05-01 PROCEDURE — 99233 SBSQ HOSP IP/OBS HIGH 50: CPT

## 2019-05-01 RX ORDER — LANOLIN ALCOHOL/MO/W.PET/CERES
5 CREAM (GRAM) TOPICAL ONCE
Qty: 0 | Refills: 0 | Status: DISCONTINUED | OUTPATIENT
Start: 2019-05-01 | End: 2019-05-01

## 2019-05-01 RX ORDER — LANOLIN ALCOHOL/MO/W.PET/CERES
6 CREAM (GRAM) TOPICAL ONCE
Qty: 0 | Refills: 0 | Status: DISCONTINUED | OUTPATIENT
Start: 2019-05-01 | End: 2019-05-06

## 2019-05-01 RX ORDER — METFORMIN HYDROCHLORIDE 850 MG/1
1 TABLET ORAL
Qty: 0 | Refills: 0 | COMMUNITY

## 2019-05-01 RX ORDER — ATORVASTATIN CALCIUM 80 MG/1
0 TABLET, FILM COATED ORAL
Qty: 0 | Refills: 0 | COMMUNITY

## 2019-05-01 RX ADMIN — Medication 100 MILLIGRAM(S): at 01:32

## 2019-05-01 RX ADMIN — HYDROMORPHONE HYDROCHLORIDE 0.5 MILLIGRAM(S): 2 INJECTION INTRAMUSCULAR; INTRAVENOUS; SUBCUTANEOUS at 20:00

## 2019-05-01 RX ADMIN — HYDROMORPHONE HYDROCHLORIDE 0.5 MILLIGRAM(S): 2 INJECTION INTRAMUSCULAR; INTRAVENOUS; SUBCUTANEOUS at 08:30

## 2019-05-01 RX ADMIN — Medication 4: at 07:54

## 2019-05-01 RX ADMIN — OXYCODONE HYDROCHLORIDE 10 MILLIGRAM(S): 5 TABLET ORAL at 14:05

## 2019-05-01 RX ADMIN — HYDROMORPHONE HYDROCHLORIDE 0.5 MILLIGRAM(S): 2 INJECTION INTRAMUSCULAR; INTRAVENOUS; SUBCUTANEOUS at 22:55

## 2019-05-01 RX ADMIN — Medication 2: at 11:45

## 2019-05-01 RX ADMIN — SODIUM CHLORIDE 100 MILLILITER(S): 9 INJECTION, SOLUTION INTRAVENOUS at 06:14

## 2019-05-01 RX ADMIN — HYDROMORPHONE HYDROCHLORIDE 0.5 MILLIGRAM(S): 2 INJECTION INTRAMUSCULAR; INTRAVENOUS; SUBCUTANEOUS at 07:54

## 2019-05-01 RX ADMIN — HYDROMORPHONE HYDROCHLORIDE 0.5 MILLIGRAM(S): 2 INJECTION INTRAMUSCULAR; INTRAVENOUS; SUBCUTANEOUS at 22:40

## 2019-05-01 RX ADMIN — HYDROMORPHONE HYDROCHLORIDE 0.5 MILLIGRAM(S): 2 INJECTION INTRAMUSCULAR; INTRAVENOUS; SUBCUTANEOUS at 05:15

## 2019-05-01 RX ADMIN — Medication 6: at 17:13

## 2019-05-01 RX ADMIN — Medication 100 MILLIGRAM(S): at 11:44

## 2019-05-01 RX ADMIN — HYDROMORPHONE HYDROCHLORIDE 0.5 MILLIGRAM(S): 2 INJECTION INTRAMUSCULAR; INTRAVENOUS; SUBCUTANEOUS at 00:35

## 2019-05-01 RX ADMIN — HYDROMORPHONE HYDROCHLORIDE 0.5 MILLIGRAM(S): 2 INJECTION INTRAMUSCULAR; INTRAVENOUS; SUBCUTANEOUS at 10:44

## 2019-05-01 RX ADMIN — SODIUM CHLORIDE 100 MILLILITER(S): 9 INJECTION, SOLUTION INTRAVENOUS at 21:50

## 2019-05-01 RX ADMIN — HYDROMORPHONE HYDROCHLORIDE 0.5 MILLIGRAM(S): 2 INJECTION INTRAMUSCULAR; INTRAVENOUS; SUBCUTANEOUS at 11:30

## 2019-05-01 RX ADMIN — HYDROMORPHONE HYDROCHLORIDE 0.5 MILLIGRAM(S): 2 INJECTION INTRAMUSCULAR; INTRAVENOUS; SUBCUTANEOUS at 17:10

## 2019-05-01 RX ADMIN — ATORVASTATIN CALCIUM 10 MILLIGRAM(S): 80 TABLET, FILM COATED ORAL at 21:49

## 2019-05-01 RX ADMIN — ENOXAPARIN SODIUM 40 MILLIGRAM(S): 100 INJECTION SUBCUTANEOUS at 11:45

## 2019-05-01 RX ADMIN — HYDROMORPHONE HYDROCHLORIDE 0.5 MILLIGRAM(S): 2 INJECTION INTRAMUSCULAR; INTRAVENOUS; SUBCUTANEOUS at 05:01

## 2019-05-01 RX ADMIN — HYDROMORPHONE HYDROCHLORIDE 0.5 MILLIGRAM(S): 2 INJECTION INTRAMUSCULAR; INTRAVENOUS; SUBCUTANEOUS at 00:20

## 2019-05-01 RX ADMIN — HYDROMORPHONE HYDROCHLORIDE 0.5 MILLIGRAM(S): 2 INJECTION INTRAMUSCULAR; INTRAVENOUS; SUBCUTANEOUS at 16:19

## 2019-05-01 RX ADMIN — HYDROMORPHONE HYDROCHLORIDE 0.5 MILLIGRAM(S): 2 INJECTION INTRAMUSCULAR; INTRAVENOUS; SUBCUTANEOUS at 19:43

## 2019-05-01 NOTE — PHYSICAL THERAPY INITIAL EVALUATION ADULT - CRITERIA FOR SKILLED THERAPEUTIC INTERVENTIONS
anticipated discharge recommendation/impairments found/anticipated equipment needs at discharge/risk reduction/prevention

## 2019-05-01 NOTE — CHART NOTE - NSCHARTNOTEFT_GEN_A_CORE
called to bedside by patient's bedside RN as  they are c/o inability to fall asleep  -patient is S/P right tib plateu fx  -on arrival found patient sitting up in bed, calm, but c/o lack of sleep  NERUO: A&Ox3  CV: + S1/S2, RRR. no mrg  RESP: CTA b/l  GI: Soft non tender, active BS  -other sleep health aides ( quit/dark room, limiting noise etc) thus far unsuccessful-will trial dose of melatonin and re-assess patient in 45 minutes

## 2019-05-01 NOTE — PHYSICAL THERAPY INITIAL EVALUATION ADULT - ADDITIONAL COMMENTS
pt lives in apartment with daughter, ramp to enter building, no steps inside apt. pt independent w/ambulation and all ADL's prior to fall.
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
## 2019-05-01 NOTE — PROGRESS NOTE ADULT - SUBJECTIVE AND OBJECTIVE BOX
STATUS POST:  right tibial plateau fx/ORIF    POST OPERATIVE DAY: #1     Vital Signs Last 24 Hrs  T(C): 36.6 (01 May 2019 07:49), Max: 37.5 (30 Apr 2019 18:45)  T(F): 97.9 (01 May 2019 07:49), Max: 99.5 (30 Apr 2019 18:45)  HR: 76 (01 May 2019 07:49) (68 - 88)  BP: 116/64 (01 May 2019 07:49) (97/52 - 151/76)  BP(mean): --  RR: 17 (01 May 2019 07:49) (14 - 18)  SpO2: 96% (01 May 2019 07:49) (95% - 98%)      SUBJECTIVE: Pt seen resting in bed with RLE elevated above level of heart; c/o poor sleep due to RLE pain, relief w Dilaudid; denies CP, SOB; tolerating po; +BM this a.m. x 1 loose stool; denies abd pain/cramps; voiding spontaneously    General Appearance: Appears well, smiling sitting up in bed, alert, oriented in NAD  HEENT; no pallor  Chest: clear, equal breath sounds  CV: regular S1S2 @ 72 presently; no murmur apprec  Abdomen: +BS, soft,non-tender  Extremities: RLE w ace wrap in knee immobilizer; dressing C&D; toes warm, pink, mobile; cap refill <3secs; 1+ right edema, soft; no calf or thigh tenderness LLE; DP pulses +2 bilat; pulses intact    I&O's Summary: reviewed    MEDICATIONS: noted  LABS:                        10.7   14.1  )-----------( 414      ( 01 May 2019 06:41 )             34.6     05-01    138  |  102  |  15  ----------------------------<  238<H>  4.5   |  29  |  0.71    Ca    8.8      01 May 2019 06:41    A: s/p right tibial plateau fx/ORIF POD#1      Anemia-acute blood loss      DM II      HLD  P: Mobilize w PT/OT, NWB RLE     Lovenox for DVT proph     Monitor H/H     Consider for Acute Rehab STATUS POST:  right tibial plateau fx/ORIF    POST OPERATIVE DAY: #1     Vital Signs Last 24 Hrs  T(C): 36.6 (01 May 2019 07:49), Max: 37.5 (30 Apr 2019 18:45)  T(F): 97.9 (01 May 2019 07:49), Max: 99.5 (30 Apr 2019 18:45)  HR: 76 (01 May 2019 07:49) (68 - 88)  BP: 116/64 (01 May 2019 07:49) (97/52 - 151/76)  BP(mean): --  RR: 17 (01 May 2019 07:49) (14 - 18)  SpO2: 96% (01 May 2019 07:49) (95% - 98%)      SUBJECTIVE: Pt seen resting in bed with RLE elevated above level of heart; c/o poor sleep due to RLE pain, relief w Dilaudid; denies CP, SOB; tolerating po; +BM this a.m. x 1 loose stool; denies abd pain/cramps; voiding spontaneously    General Appearance: Appears well, smiling sitting up in bed, alert, oriented in NAD  HEENT; no pallor  Chest: clear, equal breath sounds  CV: regular S1S2 @ 72 presently; no murmur apprec  Abdomen: +BS, soft,non-tender  Extremities: RLE w ace wrap in knee immobilizer; dressing C&D; toes warm, pink, mobile; cap refill <3secs; 1+ right edema, soft; no calf or thigh tenderness LLE; DP pulses +2 bilat; pulses intact    I&O's Summary: reviewed    MEDICATIONS: noted  LABS:                        10.7   14.1  )-----------( 414      ( 01 May 2019 06:41 )             34.6     05-01    138  |  102  |  15  ----------------------------<  238<H>  4.5   |  29  |  0.71    Ca    8.8      01 May 2019 06:41    A: s/p right tibial plateau fx/ORIF POD#1      Anemia-acute blood loss on chronic?      DM II      HLD  P: Mobilize w PT/OT, NWB RLE     Lovenox for DVT proph     Monitor H/H     Consider for Acute Rehab     Medical management STATUS POST:  right tibial plateau fx/ORIF    POST OPERATIVE DAY: #1     Vital Signs Last 24 Hrs  T(C): 36.6 (01 May 2019 07:49), Max: 37.5 (30 Apr 2019 18:45)  T(F): 97.9 (01 May 2019 07:49), Max: 99.5 (30 Apr 2019 18:45)  HR: 76 (01 May 2019 07:49) (68 - 88)  BP: 116/64 (01 May 2019 07:49) (97/52 - 151/76)  BP(mean): --  RR: 17 (01 May 2019 07:49) (14 - 18)  SpO2: 96% (01 May 2019 07:49) (95% - 98%)      SUBJECTIVE: Pt seen resting in bed with RLE elevated above level of heart; c/o poor sleep due to RLE pain, relief w Dilaudid; denies CP, SOB; tolerating po; +BM this a.m. x 1 loose stool; denies abd pain/cramps; voiding spontaneously    General Appearance: Appears well, smiling sitting up in bed, alert, oriented in NAD  HEENT; no pallor  Chest: clear, equal breath sounds  CV: regular S1S2 @ 72 presently; no murmur apprec  Abdomen: +BS, soft,non-tender  Extremities: RLE w ace wrap in knee immobilizer; dressing C&D; toes warm, pink, mobile; cap refill <3secs; 1+ right thigh edema, soft; no calf or thigh tenderness LLE; bilat DP pulses +2    I&O's Summary: reviewed    MEDICATIONS: noted  LABS:                        10.7   14.1  )-----------( 414      ( 01 May 2019 06:41 )             34.6     05-01    138  |  102  |  15  ----------------------------<  238<H>  4.5   |  29  |  0.71    Ca    8.8      01 May 2019 06:41    A: s/p right tibial plateau fx/ORIF POD#1      Anemia-acute blood loss on chronic?      DM II      HLD  P: Mobilize w PT/OT, NWB RLE     Lovenox for DVT proph     Monitor H/H     Consider for Acute Rehab     Medical management

## 2019-05-01 NOTE — PROGRESS NOTE ADULT - SUBJECTIVE AND OBJECTIVE BOX
LAANNA MCNEAL  48y  Female    Patient is a 48y old  Female who presents with a chief complaint of Fall, fracture (01 May 2019 08:42)    Pt seen and examined  no acute events overnight   pain is controlled     PAST MEDICAL & SURGICAL HISTORY:  Depression  HLD (hyperlipidemia)  Diabetes mellitus  HLD (hyperlipidemia)  Diabetes  H/O knee surgery  H/O:   S/P plastic surgery: abdominalplasty  S/P knee surgery: right  Breast cyst, left  Ankle arthralgia, right: surgey  S/P knee surgery: right knee twice  S/P shoulder surgery: right  S/P tubal ligation: 17 years ago  S/P  section: twice        MedsMEDICATIONS  (STANDING):  atorvastatin 10 milliGRAM(s) Oral at bedtime  ceFAZolin   IVPB 2000 milliGRAM(s) IV Intermittent every 8 hours  dextrose 50% Injectable 12.5 Gram(s) IV Push once  dextrose 50% Injectable 25 Gram(s) IV Push once  dextrose 50% Injectable 25 Gram(s) IV Push once  enoxaparin Injectable 40 milliGRAM(s) SubCutaneous daily  influenza   Vaccine 0.5 milliLiter(s) IntraMuscular once  insulin lispro (HumaLOG) corrective regimen sliding scale   SubCutaneous three times a day before meals  insulin lispro (HumaLOG) corrective regimen sliding scale   SubCutaneous at bedtime  lactated ringers. 1000 milliLiter(s) (100 mL/Hr) IV Continuous <Continuous>    MEDICATIONS  (PRN):  dextrose 40% Gel 15 Gram(s) Oral once PRN Blood Glucose LESS THAN 70 milliGRAM(s)/deciliter  glucagon  Injectable 1 milliGRAM(s) IntraMuscular once PRN Glucose LESS THAN 70 milligrams/deciliter  HYDROmorphone  Injectable 0.5 milliGRAM(s) IV Push every 3 hours PRN Severe Pain (7 - 10)  oxyCODONE    IR 5 milliGRAM(s) Oral every 3 hours PRN Mild Pain (1 - 3)  oxyCODONE    IR 10 milliGRAM(s) Oral every 3 hours PRN Moderate Pain (4 - 6)      Vital Signs Last 24 Hrs  T(C): 36.6 (01 May 2019 07:49), Max: 37.5 (2019 18:45)  T(F): 97.9 (01 May 2019 07:49), Max: 99.5 (2019 18:45)  HR: 76 (01 May 2019 07:49) (68 - 88)  BP: 116/64 (01 May 2019 07:49) (97/52 - 151/76)  BP(mean): --  RR: 17 (01 May 2019 07:49) (14 - 18)  SpO2: 96% (01 May 2019 07:49) (95% - 100%)    PHYSICAL EXAM:  GENERAL: NAD  NERVOUS SYSTEM:  Alert & Oriented X3  CHEST/LUNG: Clear lungs b/l  HEART: S1S2, RRR   ABDOMEN: Soft, non-tender, non-distended, + bowel sounds  EXTREMITIES:  2+ Peripheral Pulses, No clubbing, cyanosis, or edema, RLE immobilizer       LABS:                          10.7   14.1  )-----------( 414      ( 01 May 2019 06:41 )             34.6       05-01    138  |  102  |  15  ----------------------------<  238<H>  4.5   |  29  |  0.71    Ca    8.8      01 May 2019 06:41    TPro  7.5  /  Alb  3.1<L>  /  TBili  0.6  /  DBili  x   /  AST  35  /  ALT  28  /  AlkPhos  106  04-30      PT/INR - ( 01 May 2019 06:41 )   PT: 13.1 sec;   INR: 1.16 ratio         PTT - ( 2019 12:50 )  PTT:22.9 sec                              RADIOLOGY & ADDITIONAL TESTS:    Imaging Personally Reviewed:  [ x] YES  [ ] NO      HEALTH ISSUES - PROBLEM Dx:          Care Discussed with Consultants/Other Providers [ x] YES  [ ] NO

## 2019-05-01 NOTE — PROGRESS NOTE ADULT - ASSESSMENT
Pt is a 48 y o M presented with fall admitted for right tibial plateau fracture to go to OR with Dr. Hazel.    #Right tibial plateau fracture s/p repair  POD #1  PT/OT eval for acute rehab   pain control  ortho following     #DMII: SSI HgA1C 7.8%    #HLD: continue statin    #DVT PPX: Lovenox

## 2019-05-01 NOTE — CONSULT NOTE ADULT - ASSESSMENT
This is a 49 y/o female s/p fall resulting in right tibial plateau fracture s/p ORIF POD #1 with gait impairments.     Recommendations: Continue medical management per primary team.   Continue bedside therapies. This is a 47 y/o female s/p fall resulting in right tibial plateau fracture s/p ORIF POD #1 with gait impairments.     Recommendations: Continue medical management per primary team.   Continue bedside therapies.   Will discuss with rehab attending. Likely will recommend to d/c home once medically stable with outpatient therapies (to continue to work on transfers, ADLs, and maintaining NWB status), maintaining NWB to right LE, follow up with Dr Hazel.

## 2019-05-02 LAB
ALBUMIN SERPL ELPH-MCNC: 3 G/DL — LOW (ref 3.3–5)
ANION GAP SERPL CALC-SCNC: 6 MMOL/L — SIGNIFICANT CHANGE UP (ref 5–17)
BUN SERPL-MCNC: 10 MG/DL — SIGNIFICANT CHANGE UP (ref 7–23)
CALCIUM SERPL-MCNC: 8.9 MG/DL — SIGNIFICANT CHANGE UP (ref 8.4–10.5)
CHLORIDE SERPL-SCNC: 100 MMOL/L — SIGNIFICANT CHANGE UP (ref 96–108)
CO2 SERPL-SCNC: 31 MMOL/L — SIGNIFICANT CHANGE UP (ref 22–31)
CREAT SERPL-MCNC: 0.62 MG/DL — SIGNIFICANT CHANGE UP (ref 0.5–1.3)
GLUCOSE BLDC GLUCOMTR-MCNC: 166 MG/DL — HIGH (ref 70–99)
GLUCOSE BLDC GLUCOMTR-MCNC: 167 MG/DL — HIGH (ref 70–99)
GLUCOSE BLDC GLUCOMTR-MCNC: 201 MG/DL — HIGH (ref 70–99)
GLUCOSE BLDC GLUCOMTR-MCNC: 222 MG/DL — HIGH (ref 70–99)
GLUCOSE SERPL-MCNC: 210 MG/DL — HIGH (ref 70–99)
HCT VFR BLD CALC: 30.8 % — LOW (ref 34.5–45)
HGB BLD-MCNC: 10.2 G/DL — LOW (ref 11.5–15.5)
MCHC RBC-ENTMCNC: 30.8 PG — SIGNIFICANT CHANGE UP (ref 27–34)
MCHC RBC-ENTMCNC: 33.2 GM/DL — SIGNIFICANT CHANGE UP (ref 32–36)
MCV RBC AUTO: 92.9 FL — SIGNIFICANT CHANGE UP (ref 80–100)
PHOSPHATE SERPL-MCNC: 3.3 MG/DL — SIGNIFICANT CHANGE UP (ref 2.5–4.5)
PLATELET # BLD AUTO: 403 K/UL — HIGH (ref 150–400)
POTASSIUM SERPL-MCNC: 3.8 MMOL/L — SIGNIFICANT CHANGE UP (ref 3.5–5.3)
POTASSIUM SERPL-SCNC: 3.8 MMOL/L — SIGNIFICANT CHANGE UP (ref 3.5–5.3)
RBC # BLD: 3.32 M/UL — LOW (ref 3.8–5.2)
RBC # FLD: 12.8 % — SIGNIFICANT CHANGE UP (ref 10.3–14.5)
SODIUM SERPL-SCNC: 137 MMOL/L — SIGNIFICANT CHANGE UP (ref 135–145)
WBC # BLD: 12.4 K/UL — HIGH (ref 3.8–10.5)
WBC # FLD AUTO: 12.4 K/UL — HIGH (ref 3.8–10.5)

## 2019-05-02 PROCEDURE — 99233 SBSQ HOSP IP/OBS HIGH 50: CPT

## 2019-05-02 RX ORDER — ACETAMINOPHEN 500 MG
1000 TABLET ORAL ONCE
Qty: 0 | Refills: 0 | Status: COMPLETED | OUTPATIENT
Start: 2019-05-02 | End: 2019-05-02

## 2019-05-02 RX ORDER — CELECOXIB 200 MG/1
200 CAPSULE ORAL
Qty: 0 | Refills: 0 | Status: COMPLETED | OUTPATIENT
Start: 2019-05-02 | End: 2019-05-05

## 2019-05-02 RX ADMIN — HYDROMORPHONE HYDROCHLORIDE 0.5 MILLIGRAM(S): 2 INJECTION INTRAMUSCULAR; INTRAVENOUS; SUBCUTANEOUS at 09:00

## 2019-05-02 RX ADMIN — HYDROMORPHONE HYDROCHLORIDE 0.5 MILLIGRAM(S): 2 INJECTION INTRAMUSCULAR; INTRAVENOUS; SUBCUTANEOUS at 08:23

## 2019-05-02 RX ADMIN — HYDROMORPHONE HYDROCHLORIDE 0.5 MILLIGRAM(S): 2 INJECTION INTRAMUSCULAR; INTRAVENOUS; SUBCUTANEOUS at 17:20

## 2019-05-02 RX ADMIN — HYDROMORPHONE HYDROCHLORIDE 0.5 MILLIGRAM(S): 2 INJECTION INTRAMUSCULAR; INTRAVENOUS; SUBCUTANEOUS at 22:47

## 2019-05-02 RX ADMIN — CELECOXIB 200 MILLIGRAM(S): 200 CAPSULE ORAL at 17:19

## 2019-05-02 RX ADMIN — HYDROMORPHONE HYDROCHLORIDE 0.5 MILLIGRAM(S): 2 INJECTION INTRAMUSCULAR; INTRAVENOUS; SUBCUTANEOUS at 14:00

## 2019-05-02 RX ADMIN — Medication 4: at 17:20

## 2019-05-02 RX ADMIN — HYDROMORPHONE HYDROCHLORIDE 0.5 MILLIGRAM(S): 2 INJECTION INTRAMUSCULAR; INTRAVENOUS; SUBCUTANEOUS at 18:18

## 2019-05-02 RX ADMIN — HYDROMORPHONE HYDROCHLORIDE 0.5 MILLIGRAM(S): 2 INJECTION INTRAMUSCULAR; INTRAVENOUS; SUBCUTANEOUS at 01:46

## 2019-05-02 RX ADMIN — HYDROMORPHONE HYDROCHLORIDE 0.5 MILLIGRAM(S): 2 INJECTION INTRAMUSCULAR; INTRAVENOUS; SUBCUTANEOUS at 11:53

## 2019-05-02 RX ADMIN — HYDROMORPHONE HYDROCHLORIDE 0.5 MILLIGRAM(S): 2 INJECTION INTRAMUSCULAR; INTRAVENOUS; SUBCUTANEOUS at 11:18

## 2019-05-02 RX ADMIN — CELECOXIB 200 MILLIGRAM(S): 200 CAPSULE ORAL at 18:17

## 2019-05-02 RX ADMIN — ATORVASTATIN CALCIUM 10 MILLIGRAM(S): 80 TABLET, FILM COATED ORAL at 21:21

## 2019-05-02 RX ADMIN — Medication 400 MILLIGRAM(S): at 11:26

## 2019-05-02 RX ADMIN — Medication 2: at 11:27

## 2019-05-02 RX ADMIN — HYDROMORPHONE HYDROCHLORIDE 0.5 MILLIGRAM(S): 2 INJECTION INTRAMUSCULAR; INTRAVENOUS; SUBCUTANEOUS at 02:00

## 2019-05-02 RX ADMIN — HYDROMORPHONE HYDROCHLORIDE 0.5 MILLIGRAM(S): 2 INJECTION INTRAMUSCULAR; INTRAVENOUS; SUBCUTANEOUS at 05:03

## 2019-05-02 RX ADMIN — HYDROMORPHONE HYDROCHLORIDE 0.5 MILLIGRAM(S): 2 INJECTION INTRAMUSCULAR; INTRAVENOUS; SUBCUTANEOUS at 23:45

## 2019-05-02 RX ADMIN — Medication 1000 MILLIGRAM(S): at 12:00

## 2019-05-02 RX ADMIN — HYDROMORPHONE HYDROCHLORIDE 0.5 MILLIGRAM(S): 2 INJECTION INTRAMUSCULAR; INTRAVENOUS; SUBCUTANEOUS at 14:26

## 2019-05-02 RX ADMIN — Medication 2: at 08:23

## 2019-05-02 RX ADMIN — HYDROMORPHONE HYDROCHLORIDE 0.5 MILLIGRAM(S): 2 INJECTION INTRAMUSCULAR; INTRAVENOUS; SUBCUTANEOUS at 05:20

## 2019-05-02 RX ADMIN — ENOXAPARIN SODIUM 40 MILLIGRAM(S): 100 INJECTION SUBCUTANEOUS at 11:26

## 2019-05-02 NOTE — PROGRESS NOTE ADULT - SUBJECTIVE AND OBJECTIVE BOX
Surgery PA Note:  POD#2    S/P ORIF Right Tibial Plateau     Patient was seen and examined by me .  Patient is still c/o surgical discomfort 9/10  requiring Dilaudid every   3 hours.  I have discussed with patient taking less narcotic and adding IV tylenol and celebrex as a pain   adjunct.      Vital Signs Last 24 Hrs  T(C): 37.4 (02 May 2019 08:00), Max: 37.4 (02 May 2019 08:00)  T(F): 99.3 (02 May 2019 08:00), Max: 99.3 (02 May 2019 08:00)  HR: 75 (02 May 2019 08:00) (67 - 75)  BP: 110/58 (02 May 2019 08:00) (104/63 - 120/71)  BP(mean): --  RR: 16 (02 May 2019 08:00) (16 - 17)  SpO2: 95% (02 May 2019 08:00) (94% - 95%)      PE:  Alert and oriented x3   Lungs:  Cta  Cor:  RR   Abd:  soft, + BS -BM tolerating a diet, voiding  Right Leg in Knee Immobilizer , Ace wrap dressing c/D/I, toes warm, good ROM + neurovascular intact.    Patient denies calf pain - Doppler study for DVT    labs:                           10.2   12.4  )-----------( 403      ( 02 May 2019 06:43 )             30.8   05-02    137  |  100  |  10  ----------------------------<  210<H>  3.8   |  31  |  0.62    Ca    8.9      02 May 2019 06:43  Phos  3.3     05-02    TPro  x   /  Alb  3.0<L>  /  TBili  x   /  DBili  x   /  AST  x   /  ALT  x   /  AlkPhos  x   05-02

## 2019-05-02 NOTE — OCCUPATIONAL THERAPY INITIAL EVALUATION ADULT - ADDITIONAL COMMENTS
Per pt and family, pt had assistance 5 days per week for a "few hours" a day to assist with IADL cooking, cleaning, and intermittently assisting with bathing. Pt does not drive or work, used a SAC at all times prior to admission. Otherwise no other DME owned.
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
## 2019-05-02 NOTE — PROGRESS NOTE ADULT - ASSESSMENT
Pt is a 48 y o M presented with fall admitted for right tibial plateau fracture to go to OR with Dr. Hazel.    #Right tibial plateau fracture s/p repair  POD #2  PT/OT eval for acute rehab   pain control  ortho following     #DMII: SSI HgA1C 7.8%    #HLD: continue statin    #DVT PPX: Lovenox

## 2019-05-02 NOTE — PROGRESS NOTE ADULT - ASSESSMENT
Sugically stable /pain   Diabetes  HLD  Depression    PLan:  PT/OT            NWB right leg             Keep immobilizer on x 1 week do not begin flexion at this time            Pain Management             Acute rehab auth pending

## 2019-05-02 NOTE — PROGRESS NOTE ADULT - SUBJECTIVE AND OBJECTIVE BOX
ALANNA MCNEAL  48y  Female    Patient is a 48y old  Female who presents with a chief complaint of Fall, fracture (02 May 2019 09:21)    Pt seen and examined  no events overnight     PAST MEDICAL & SURGICAL HISTORY:  Depression  HLD (hyperlipidemia)  Diabetes mellitus  HLD (hyperlipidemia)  Diabetes  H/O knee surgery  H/O:   S/P plastic surgery: abdominalplasty  S/P knee surgery: right  Breast cyst, left  Ankle arthralgia, right: surgey  S/P knee surgery: right knee twice  S/P shoulder surgery: right  S/P tubal ligation: 17 years ago  S/P  section: twice        MedsMEDICATIONS  (STANDING):  acetaminophen  IVPB .. 1000 milliGRAM(s) IV Intermittent once  atorvastatin 10 milliGRAM(s) Oral at bedtime  celecoxib 200 milliGRAM(s) Oral two times a day  dextrose 50% Injectable 12.5 Gram(s) IV Push once  dextrose 50% Injectable 25 Gram(s) IV Push once  dextrose 50% Injectable 25 Gram(s) IV Push once  enoxaparin Injectable 40 milliGRAM(s) SubCutaneous daily  influenza   Vaccine 0.5 milliLiter(s) IntraMuscular once  insulin lispro (HumaLOG) corrective regimen sliding scale   SubCutaneous three times a day before meals  insulin lispro (HumaLOG) corrective regimen sliding scale   SubCutaneous at bedtime  melatonin 6 milliGRAM(s) Oral once    MEDICATIONS  (PRN):  dextrose 40% Gel 15 Gram(s) Oral once PRN Blood Glucose LESS THAN 70 milliGRAM(s)/deciliter  glucagon  Injectable 1 milliGRAM(s) IntraMuscular once PRN Glucose LESS THAN 70 milligrams/deciliter  HYDROmorphone  Injectable 0.5 milliGRAM(s) IV Push every 3 hours PRN Severe Pain (7 - 10)  oxyCODONE    IR 5 milliGRAM(s) Oral every 3 hours PRN Mild Pain (1 - 3)  oxyCODONE    IR 10 milliGRAM(s) Oral every 3 hours PRN Moderate Pain (4 - 6)      Vital Signs Last 24 Hrs  T(C): 37.4 (02 May 2019 08:00), Max: 37.4 (02 May 2019 08:00)  T(F): 99.3 (02 May 2019 08:00), Max: 99.3 (02 May 2019 08:00)  HR: 75 (02 May 2019 08:00) (67 - 75)  BP: 110/58 (02 May 2019 08:00) (104/63 - 120/71)  BP(mean): --  RR: 16 (02 May 2019 08:00) (16 - 17)  SpO2: 95% (02 May 2019 08:00) (94% - 95%)    PHYSICAL EXAM:  GENERAL: NAD  NERVOUS SYSTEM:  Alert & Oriented X3  CHEST/LUNG: Clear lungs b/l  HEART: S1S2, RRR   ABDOMEN: Soft, non-tender, non-distended, + bowel sounds      LABS:                          10.2   12.4  )-----------( 403      ( 02 May 2019 06:43 )             30.8       05-02    137  |  100  |  10  ----------------------------<  210<H>  3.8   |  31  |  0.62    Ca    8.9      02 May 2019 06:43  Phos  3.3     05-02    TPro  x   /  Alb  3.0<L>  /  TBili  x   /  DBili  x   /  AST  x   /  ALT  x   /  AlkPhos  x   05-02      PT/INR - ( 01 May 2019 06:41 )   PT: 13.1 sec;   INR: 1.16 ratio         PTT - ( 2019 12:50 )  PTT:22.9 sec                              RADIOLOGY & ADDITIONAL TESTS:    Imaging Personally Reviewed:  [ ] YES  [ ] NO      HEALTH ISSUES - PROBLEM Dx:          Care Discussed with Consultants/Other Providers [ x] YES  [ ] NO

## 2019-05-03 ENCOUNTER — TRANSCRIPTION ENCOUNTER (OUTPATIENT)
Age: 49
End: 2019-05-03

## 2019-05-03 LAB
ANION GAP SERPL CALC-SCNC: 8 MMOL/L — SIGNIFICANT CHANGE UP (ref 5–17)
BUN SERPL-MCNC: 10 MG/DL — SIGNIFICANT CHANGE UP (ref 7–23)
CALCIUM SERPL-MCNC: 9.1 MG/DL — SIGNIFICANT CHANGE UP (ref 8.4–10.5)
CHLORIDE SERPL-SCNC: 100 MMOL/L — SIGNIFICANT CHANGE UP (ref 96–108)
CO2 SERPL-SCNC: 30 MMOL/L — SIGNIFICANT CHANGE UP (ref 22–31)
CREAT SERPL-MCNC: 0.54 MG/DL — SIGNIFICANT CHANGE UP (ref 0.5–1.3)
GLUCOSE BLDC GLUCOMTR-MCNC: 165 MG/DL — HIGH (ref 70–99)
GLUCOSE BLDC GLUCOMTR-MCNC: 198 MG/DL — HIGH (ref 70–99)
GLUCOSE BLDC GLUCOMTR-MCNC: 211 MG/DL — HIGH (ref 70–99)
GLUCOSE BLDC GLUCOMTR-MCNC: 233 MG/DL — HIGH (ref 70–99)
GLUCOSE SERPL-MCNC: 174 MG/DL — HIGH (ref 70–99)
HCT VFR BLD CALC: 34.2 % — LOW (ref 34.5–45)
HGB BLD-MCNC: 10.9 G/DL — LOW (ref 11.5–15.5)
MCHC RBC-ENTMCNC: 29.3 PG — SIGNIFICANT CHANGE UP (ref 27–34)
MCHC RBC-ENTMCNC: 31.7 GM/DL — LOW (ref 32–36)
MCV RBC AUTO: 92.3 FL — SIGNIFICANT CHANGE UP (ref 80–100)
PLATELET # BLD AUTO: 412 K/UL — HIGH (ref 150–400)
POTASSIUM SERPL-MCNC: 3.9 MMOL/L — SIGNIFICANT CHANGE UP (ref 3.5–5.3)
POTASSIUM SERPL-SCNC: 3.9 MMOL/L — SIGNIFICANT CHANGE UP (ref 3.5–5.3)
RBC # BLD: 3.71 M/UL — LOW (ref 3.8–5.2)
RBC # FLD: 12.9 % — SIGNIFICANT CHANGE UP (ref 10.3–14.5)
SODIUM SERPL-SCNC: 138 MMOL/L — SIGNIFICANT CHANGE UP (ref 135–145)
WBC # BLD: 10.5 K/UL — SIGNIFICANT CHANGE UP (ref 3.8–10.5)
WBC # FLD AUTO: 10.5 K/UL — SIGNIFICANT CHANGE UP (ref 3.8–10.5)

## 2019-05-03 PROCEDURE — 99239 HOSP IP/OBS DSCHRG MGMT >30: CPT

## 2019-05-03 RX ORDER — ENOXAPARIN SODIUM 100 MG/ML
40 INJECTION SUBCUTANEOUS
Qty: 0 | Refills: 0 | COMMUNITY
Start: 2019-05-03

## 2019-05-03 RX ORDER — METFORMIN HYDROCHLORIDE 850 MG/1
1 TABLET ORAL
Qty: 0 | Refills: 0 | COMMUNITY

## 2019-05-03 RX ORDER — AZTREONAM 2 G
1 VIAL (EA) INJECTION
Qty: 0 | Refills: 0 | COMMUNITY

## 2019-05-03 RX ORDER — ATORVASTATIN CALCIUM 80 MG/1
1 TABLET, FILM COATED ORAL
Qty: 0 | Refills: 0 | COMMUNITY

## 2019-05-03 RX ORDER — OXYCODONE HYDROCHLORIDE 5 MG/1
1 TABLET ORAL
Qty: 0 | Refills: 0 | COMMUNITY
Start: 2019-05-03

## 2019-05-03 RX ADMIN — CELECOXIB 200 MILLIGRAM(S): 200 CAPSULE ORAL at 17:09

## 2019-05-03 RX ADMIN — ENOXAPARIN SODIUM 40 MILLIGRAM(S): 100 INJECTION SUBCUTANEOUS at 12:07

## 2019-05-03 RX ADMIN — CELECOXIB 200 MILLIGRAM(S): 200 CAPSULE ORAL at 05:40

## 2019-05-03 RX ADMIN — OXYCODONE HYDROCHLORIDE 10 MILLIGRAM(S): 5 TABLET ORAL at 13:00

## 2019-05-03 RX ADMIN — HYDROMORPHONE HYDROCHLORIDE 0.5 MILLIGRAM(S): 2 INJECTION INTRAMUSCULAR; INTRAVENOUS; SUBCUTANEOUS at 21:04

## 2019-05-03 RX ADMIN — Medication 2: at 12:07

## 2019-05-03 RX ADMIN — OXYCODONE HYDROCHLORIDE 10 MILLIGRAM(S): 5 TABLET ORAL at 04:25

## 2019-05-03 RX ADMIN — OXYCODONE HYDROCHLORIDE 10 MILLIGRAM(S): 5 TABLET ORAL at 18:53

## 2019-05-03 RX ADMIN — OXYCODONE HYDROCHLORIDE 10 MILLIGRAM(S): 5 TABLET ORAL at 12:20

## 2019-05-03 RX ADMIN — HYDROMORPHONE HYDROCHLORIDE 0.5 MILLIGRAM(S): 2 INJECTION INTRAMUSCULAR; INTRAVENOUS; SUBCUTANEOUS at 21:19

## 2019-05-03 RX ADMIN — Medication 4: at 17:10

## 2019-05-03 RX ADMIN — OXYCODONE HYDROCHLORIDE 10 MILLIGRAM(S): 5 TABLET ORAL at 19:53

## 2019-05-03 RX ADMIN — ATORVASTATIN CALCIUM 10 MILLIGRAM(S): 80 TABLET, FILM COATED ORAL at 21:02

## 2019-05-03 RX ADMIN — CELECOXIB 200 MILLIGRAM(S): 200 CAPSULE ORAL at 18:00

## 2019-05-03 RX ADMIN — CELECOXIB 200 MILLIGRAM(S): 200 CAPSULE ORAL at 07:00

## 2019-05-03 RX ADMIN — Medication 2: at 08:23

## 2019-05-03 NOTE — DISCHARGE NOTE PROVIDER - NSDCCPCAREPLAN_GEN_ALL_CORE_FT
PRINCIPAL DISCHARGE DIAGNOSIS  Diagnosis: Closed fracture of right tibial plateau, initial encounter  Assessment and Plan of Treatment: Pain control  Non-weight bearing to right leg x 3 months  Wear right knee imobilizer x 7 days  okay to flex your knee starting in 7 days, but cannot bear weight  Follow up with ortho in 2 weeks PRINCIPAL DISCHARGE DIAGNOSIS  Diagnosis: Closed fracture of right tibial plateau, initial encounter  Assessment and Plan of Treatment: -Pain control  -Non-weight bearing to right leg x 3 months  -Wear right knee imobilizer x 7 days  -No flexion of knee for 7 days  -Follow up with ortho in 2 weeks  -When you leave rehab, you can take Aspirin 325 mg twice daily to prevent blood clots (while in rehab, you will receieve Lovenox injection instead of taking Aspirin)

## 2019-05-03 NOTE — PROGRESS NOTE ADULT - ASSESSMENT
Pt is a 48M presented with fall admitted for right tibial plateau fracture s/p repair with Dr. Hazel     #Right tibial plateau fracture s/p repair  POD #3  Awaiting auth for [facility] placement  Mobilize w PT-Pt to remain NON-weight bearing x 3 mos  No flexion to right knee x 7 days  Wear knee immobilizer x 7 days  F/U Dr. Hazel    #DMII: SSI HgA1C 7.8%  -ISS  -Resume metformin as outpatient    #HLD: continue statin    #DVT PPX: Lovenox    Dispo: Awaiting auth for d/c to [facility]

## 2019-05-03 NOTE — DISCHARGE NOTE NURSING/CASE MANAGEMENT/SOCIAL WORK - NSDCDPATPORTLINK_GEN_ALL_CORE
You can access the [facility] Portal, offered by [facility], by registering with the following website: [email or web address]

## 2019-05-03 NOTE — DISCHARGE NOTE PROVIDER - CARE PROVIDER_API CALL
Chad Hazel)  Orthopaedic Surgery  [address]  [place] [address]  Phone: [phone]  Fax: [phone]  Follow Up Time:

## 2019-05-03 NOTE — PROGRESS NOTE ADULT - SUBJECTIVE AND OBJECTIVE BOX
Patient is a 48y old  Female who presents with a chief complaint of Fall, fracture (03 May 2019 09:09)    Patient seen and examined at bedside. No new complaints.     ALLERGIES:  No Known Allergies    MEDICATIONS  (STANDING):  atorvastatin 10 milliGRAM(s) Oral at bedtime  celecoxib 200 milliGRAM(s) Oral two times a day  dextrose 50% Injectable 12.5 Gram(s) IV Push once  dextrose 50% Injectable 25 Gram(s) IV Push once  dextrose 50% Injectable 25 Gram(s) IV Push once  enoxaparin Injectable 40 milliGRAM(s) SubCutaneous daily  influenza   Vaccine 0.5 milliLiter(s) IntraMuscular once  insulin lispro (HumaLOG) corrective regimen sliding scale   SubCutaneous three times a day before meals  insulin lispro (HumaLOG) corrective regimen sliding scale   SubCutaneous at bedtime  melatonin 6 milliGRAM(s) Oral once    MEDICATIONS  (PRN):  dextrose 40% Gel 15 Gram(s) Oral once PRN Blood Glucose LESS THAN 70 milliGRAM(s)/deciliter  glucagon  Injectable 1 milliGRAM(s) IntraMuscular once PRN Glucose LESS THAN 70 milligrams/deciliter  HYDROmorphone  Injectable 0.5 milliGRAM(s) IV Push every 3 hours PRN Severe Pain (7 - 10)  oxyCODONE    IR 5 milliGRAM(s) Oral every 3 hours PRN Mild Pain (1 - 3)  oxyCODONE    IR 10 milliGRAM(s) Oral every 3 hours PRN Moderate Pain (4 - 6)    Vital Signs Last 24 Hrs  T(F): 97.9 (03 May 2019 10:18), Max: 98.9 (02 May 2019 21:09)  HR: 77 (03 May 2019 10:18) (71 - 77)  BP: 95/55 (03 May 2019 10:18) (95/55 - 105/58)  RR: 14 (03 May 2019 10:18) (14 - 16)  SpO2: 97% (03 May 2019 10:18) (95% - 98%)  I&O's Summary    02 May 2019 07:01  -  03 May 2019 07:00  --------------------------------------------------------  IN: 480 mL / OUT: 0 mL / NET: 480 mL      PHYSICAL EXAM:  General: NAD, A/O x 3  ENT: MMM  Neck: Supple, No JVD  Lungs: Clear to auscultation bilaterally  Cardio: RRR, S1/S2, No murmurs  Abdomen: Soft, Nontender, Nondistended; Bowel sounds present  Extremities: No calf tenderness, No pitting edema, right knee in immobilizer, +DP pulses bilaterally     LABS:                        10.9   10.5  )-----------( 412      ( 03 May 2019 05:31 )             34.2     05-03    138  |  100  |  10  ----------------------------<  174  3.9   |  30  |  0.54    Ca    9.1      03 May 2019 05:31  Phos  3.3     05-02    TPro  x   /  Alb  3.0  /  TBili  x   /  DBili  x   /  AST  x   /  ALT  x   /  AlkPhos  x   05-02    eGFR if Non African American: 111 mL/min/1.73M2 (05-03-19 @ 05:31)  eGFR if African American: 129 mL/min/1.73M2 (05-03-19 @ 05:31)    PT/INR - ( 01 May 2019 06:41 )   PT: 13.1 sec;   INR: 1.16 ratio         PTT - ( 30 Apr 2019 12:50 )  PTT:22.9 sec    CAPILLARY BLOOD GLUCOSE      POCT Blood Glucose.: 165 mg/dL (03 May 2019 07:36)  POCT Blood Glucose.: 201 mg/dL (02 May 2019 21:09)  POCT Blood Glucose.: 222 mg/dL (02 May 2019 17:16)  POCT Blood Glucose.: 167 mg/dL (02 May 2019 11:25)    05-01 [id number] 7.8

## 2019-05-03 NOTE — PROGRESS NOTE ADULT - SUBJECTIVE AND OBJECTIVE BOX
STATUS POST: right tibial plateau fx/ORIF      POST OPERATIVE DAY: #3     Vital Signs Last 24 Hrs  T(C): 36.8 (03 May 2019 05:42), Max: 37.2 (02 May 2019 21:09)  T(F): 98.2 (03 May 2019 05:42), Max: 98.9 (02 May 2019 21:09)  HR: 71 (03 May 2019 05:42) (71 - 77)  BP: 100/55 (03 May 2019 05:42) (100/55 - 105/58)  BP(mean): --  RR: 14 (03 May 2019 05:42) (14 - 16)  SpO2: 98% (03 May 2019 05:42) (95% - 98%)      SUBJECTIVE: Pt seen resting in bed w RLE elevated; overall feeling improved though c/o pain in RLE w movement, relief w Oxycodone; +OOB w PT, NWB on RLE; tolerating po diet; + BM; voiding spontaneously; denies CP, SOB or dizziness; await Rehab      General Appearance: somewhat anxious but cooperative, sitting up in bed, alert, oriented in NAD  Chest: clear, equal breath sounds bilat  CV: regular S1S2 @ 72 presently  Abdomen: +BS, soft,non-tender, non-tense  Extremities: RLE w ace wrap/knee immobilizer; toes pink, warm, cap refill <3secs; trace ankle edema LLE; right thigh supple, non-tender; sensation intact     MEDICATIONS: noted  LABS: stable                        10.9   10.5  )-----------( 412      ( 03 May 2019 05:31 )             34.2     05-03    138  |  100  |  10  ----------------------------<  174<H>  3.9   |  30  |  0.54    Ca    9.1      03 May 2019 05:31  Phos  3.3     05-02    TPro  x   /  Alb  3.0<L>  /  TBili  x   /  DBili  x   /  AST  x   /  ALT  x   /  AlkPhos  x   05-0    RADIOLOGY & ADDITIONAL STUDIES:        BLE sono neg for DVT    A: s/p right tibial plateau fx/ORIF POD#3      Anemia-asx      DM II  P: D/C [facility] when bed available      Mobilize w PT-Pt to remain NON-weight bearing x 3 mos                           No flexion to right knee x 7 days                           Wear knee immobilizer x 7 days                           F/U Dr. Hazel      Lovenox 40mg sq daily for DVT prophylaxis STATUS POST: right tibial plateau fx/ORIF      POST OPERATIVE DAY: #3     Vital Signs Last 24 Hrs  T(C): 36.8 (03 May 2019 05:42), Max: 37.2 (02 May 2019 21:09)  T(F): 98.2 (03 May 2019 05:42), Max: 98.9 (02 May 2019 21:09)  HR: 71 (03 May 2019 05:42) (71 - 77)  BP: 100/55 (03 May 2019 05:42) (100/55 - 105/58)  BP(mean): --  RR: 14 (03 May 2019 05:42) (14 - 16)  SpO2: 98% (03 May 2019 05:42) (95% - 98%)      SUBJECTIVE: Pt seen resting in bed w RLE elevated; overall feeling improved though c/o pain in RLE w movement, relief w Oxycodone; +OOB w PT, NWB on RLE; tolerating po diet; + BM; voiding spontaneously; denies CP, SOB or dizziness; await Rehab      General Appearance: somewhat anxious but cooperative, sitting up in bed, alert, oriented in NAD  Chest: clear, equal breath sounds bilat  CV: regular S1S2 @ 72 presently  Abdomen: +BS, soft,non-tender, non-tense  Extremities: RLE w ace wrap/knee immobilizer; toes pink, warm, cap refill <3secs; trace ankle edema LLE; right thigh supple, non-tender; sensation intact     MEDICATIONS: noted  LABS: stable                        10.9   10.5  )-----------( 412      ( 03 May 2019 05:31 )             34.2     05-03    138  |  100  |  10  ----------------------------<  174<H>  3.9   |  30  |  0.54    Ca    9.1      03 May 2019 05:31  Phos  3.3     05-02    TPro  x   /  Alb  3.0<L>  /  TBili  x   /  DBili  x   /  AST  x   /  ALT  x   /  AlkPhos  x   05-0    RADIOLOGY & ADDITIONAL STUDIES:        BLE sono neg for DVT    A: s/p right tibial plateau fx/ORIF POD#3      Anemia-asx      DM II  P: D/C [facility] when bed available      Mobilize w PT-Pt to remain NON-weight bearing x 3 mos                           No flexion to right knee x 7 days                           Wear knee immobilizer x 7 days                           F/U Dr. Hazel       Lovenox 40mg sq daily for DVT prophylaxis while in Rehab, then Ecotrin 325mg po BID until seen by Dr. Hazel

## 2019-05-03 NOTE — DISCHARGE NOTE PROVIDER - HOSPITAL COURSE
Patient is a 48F admitted for a right tibial plateau fracture after a mechanical fall, s/p repair with Dr. Hazel. She is NWB x 3 months and stable for d/c to [facility].         *Please refer to progress note from same date for an extended detailed summary of all of the patient's medical diagnoses during the course of this hospital stay.

## 2019-05-04 LAB
GLUCOSE BLDC GLUCOMTR-MCNC: 173 MG/DL — HIGH (ref 70–99)
GLUCOSE BLDC GLUCOMTR-MCNC: 188 MG/DL — HIGH (ref 70–99)
GLUCOSE BLDC GLUCOMTR-MCNC: 190 MG/DL — HIGH (ref 70–99)
GLUCOSE BLDC GLUCOMTR-MCNC: 304 MG/DL — HIGH (ref 70–99)

## 2019-05-04 PROCEDURE — 99233 SBSQ HOSP IP/OBS HIGH 50: CPT

## 2019-05-04 RX ADMIN — CELECOXIB 200 MILLIGRAM(S): 200 CAPSULE ORAL at 17:11

## 2019-05-04 RX ADMIN — CELECOXIB 200 MILLIGRAM(S): 200 CAPSULE ORAL at 06:22

## 2019-05-04 RX ADMIN — ENOXAPARIN SODIUM 40 MILLIGRAM(S): 100 INJECTION SUBCUTANEOUS at 11:54

## 2019-05-04 RX ADMIN — Medication 2: at 17:11

## 2019-05-04 RX ADMIN — ATORVASTATIN CALCIUM 10 MILLIGRAM(S): 80 TABLET, FILM COATED ORAL at 21:41

## 2019-05-04 RX ADMIN — CELECOXIB 200 MILLIGRAM(S): 200 CAPSULE ORAL at 05:22

## 2019-05-04 RX ADMIN — Medication 2: at 08:30

## 2019-05-04 RX ADMIN — Medication 4: at 21:42

## 2019-05-04 RX ADMIN — CELECOXIB 200 MILLIGRAM(S): 200 CAPSULE ORAL at 18:30

## 2019-05-04 RX ADMIN — OXYCODONE HYDROCHLORIDE 10 MILLIGRAM(S): 5 TABLET ORAL at 12:00

## 2019-05-04 RX ADMIN — Medication 2: at 11:54

## 2019-05-04 RX ADMIN — OXYCODONE HYDROCHLORIDE 10 MILLIGRAM(S): 5 TABLET ORAL at 11:49

## 2019-05-04 NOTE — PROGRESS NOTE ADULT - SUBJECTIVE AND OBJECTIVE BOX
Procedure: S/P ORIF Right Tibial plateau  POD #: 4    S: Pt without complaints. No SOB,CP, N/V. Tolerated Diet well.  Pain comfortable (3-4/10 ) on  Interval Rx. Katerine Rx well.  [+BM], + flatus, No abdominal pain.  NWB Right-  Walked with walker  Pain Rx:  celecoxib 200 milliGRAM(s) Oral two times a day  HYDROmorphone  Injectable 0.5 milliGRAM(s) IV Push every 3 hours PRN  melatonin 6 milliGRAM(s) Oral once  oxyCODONE    IR 5 milliGRAM(s) Oral every 3 hours PRN  oxyCODONE    IR 10 milliGRAM(s) Oral every 3 hours PRN    O: General: On exam, No Apparent Distress  Vital Signs Last 24 Hrs  T(C): 36.6 (04 May 2019 08:32), Max: 36.9 (04 May 2019 05:43)  T(F): 97.8 (04 May 2019 08:32), Max: 98.5 (04 May 2019 05:43)  HR: 66 (04 May 2019 08:32) (66 - 86)  BP: 92/49 (04 May 2019 08:32) (92/49 - 104/54)  RR: 16 (04 May 2019 08:32) (14 - 16)  SpO2: 94% (04 May 2019 08:32) (94% - 100%)    Lungs: BS clear bilat.  [Abdomen]: + BS, soft , benign exam     Ext(Knee): Right Knee [ Jimenes ] Dressing changed; [Incision] clean, dry, & intact]; Nylon sutures intact; no  dehiscence; No  cellulitis; Min.  effusion [ soft ]. Minimal soft tissue swelling knee/thigh; Ecchymosis lateral side noted.   ROM: Extension 0 deg. ; Flexion 0 deg. NO flexion allowed for at least 1 week Post-Op  Neurologic:  Has sensation over feet & toes bilat, though Right with slight diminished fine touch dorsum foot; harsh touch intact right foot, toes, & ankle. Full AROM bilat feet & toes. EHL/AT = 4-5/5 on Right and 5/5 on left  Vascular: Feet toes warm, pink. DP = 2+. Car refill brisk 1-2 sec bilat. toes. No calf tenderness bilat..    VTEP: On Bilat. Venodynes + enoxaparin Injectable 40 milliGRAM(s) SubCutaneous daily    Activity in PT yesterday Noted. [ Walked 15 ft. with walker with Contact guard]. [Sat up for 2 hours].  Labs yesterday noted.  [Post Op Anemia (min)(tolerated with amb); Chem:  stable   Hospitalist input noted.    Primary Orthopedic Assessment:  • Stable from Orthopedic perspective  • Neuro motor exam stable - min decrease sensation foot due to Post Op effect  • Labs: CBC / Chem stable    Plan:   • Continue:  PT/OT/ NWB with assistance of a walker/Ice to knee/ Knee immobilizer /elevation   • Continue DVT prophylaxis - Lovenox  • Continue Pain Rx  • Discharge planning – anticipated discharge is  Subacute Rehab facility per patient choice when authorization obtained.

## 2019-05-04 NOTE — PROGRESS NOTE ADULT - ASSESSMENT
Pt is a 48M presented with fall admitted for right tibial plateau fracture s/p repair with Dr. Hazel     Right tibial plateau fracture s/p repair  POD #4  Awaiting auth for [facility] placement  Mobilize w PT-Pt to remain NON-weight bearing x 3 month  No flexion to right knee x 7 days  Wear knee immobilizer x 7 days  F/U Dr. Hazel    Diabetes Type 2: SSI HgA1C 7.8%  -ISS  -Resume metformin as outpatient    Hyperlipidemia: continue statin    Thrombocytosis  - likely reactive  - monitor    DVT PPX: Lovenox    Awaiting auth for d/c to [facility]

## 2019-05-04 NOTE — PROGRESS NOTE ADULT - SUBJECTIVE AND OBJECTIVE BOX
Patient is a 48y old  Female who presents with a chief complaint of Fall, fracture (03 May 2019 13:50)    No issues.     Patient seen and examined at bedside.    ALLERGIES:  No Known Allergies    MEDICATIONS  (STANDING):  atorvastatin 10 milliGRAM(s) Oral at bedtime  celecoxib 200 milliGRAM(s) Oral two times a day  dextrose 50% Injectable 12.5 Gram(s) IV Push once  dextrose 50% Injectable 25 Gram(s) IV Push once  dextrose 50% Injectable 25 Gram(s) IV Push once  enoxaparin Injectable 40 milliGRAM(s) SubCutaneous daily  influenza   Vaccine 0.5 milliLiter(s) IntraMuscular once  insulin lispro (HumaLOG) corrective regimen sliding scale   SubCutaneous three times a day before meals  insulin lispro (HumaLOG) corrective regimen sliding scale   SubCutaneous at bedtime  melatonin 6 milliGRAM(s) Oral once    MEDICATIONS  (PRN):  dextrose 40% Gel 15 Gram(s) Oral once PRN Blood Glucose LESS THAN 70 milliGRAM(s)/deciliter  glucagon  Injectable 1 milliGRAM(s) IntraMuscular once PRN Glucose LESS THAN 70 milligrams/deciliter  HYDROmorphone  Injectable 0.5 milliGRAM(s) IV Push every 3 hours PRN Severe Pain (7 - 10)  oxyCODONE    IR 5 milliGRAM(s) Oral every 3 hours PRN Mild Pain (1 - 3)  oxyCODONE    IR 10 milliGRAM(s) Oral every 3 hours PRN Moderate Pain (4 - 6)    Vital Signs Last 24 Hrs  T(F): 98.5 (04 May 2019 05:43), Max: 98.5 (04 May 2019 05:43)  HR: 85 (04 May 2019 05:43) (77 - 86)  BP: 104/54 (04 May 2019 05:43) (94/67 - 104/54)  RR: 16 (04 May 2019 05:43) (14 - 16)  SpO2: 100% (04 May 2019 05:43) (95% - 100%)  I&O's Summary    03 May 2019 07:01  -  04 May 2019 07:00  --------------------------------------------------------  IN: 720 mL / OUT: 0 mL / NET: 720 mL    BMI (kg/m2): 31.7 (04-30-19 @ 14:15)  PHYSICAL EXAM:  General: NAD, A/O x 3  ENT: MMM  Neck: Supple, No JVD  Lungs: Clear to auscultation bilaterally  Cardio: RRR, S1/S2, No murmurs  Abdomen: Soft, Nontender, Nondistended; Bowel sounds present  Extremities: Right leg in immobilizer/cast    LABS:                        10.9   10.5  )-----------( 412      ( 03 May 2019 05:31 )             34.2       05-03    138  |  100  |  10  ----------------------------<  174  3.9   |  30  |  0.54    Ca    9.1      03 May 2019 05:31  Phos  3.3     05-02    TPro  x   /  Alb  3.0  /  TBili  x   /  DBili  x   /  AST  x   /  ALT  x   /  AlkPhos  x   05-02     eGFR if Non African American: 111 mL/min/1.73M2 (05-03-19 @ 05:31)  eGFR if African American: 129 mL/min/1.73M2 (05-03-19 @ 05:31)    CAPILLARY BLOOD GLUCOSE    POCT Blood Glucose.: 211 mg/dL (03 May 2019 21:01)  POCT Blood Glucose.: 233 mg/dL (03 May 2019 16:36)  POCT Blood Glucose.: 198 mg/dL (03 May 2019 11:15)  POCT Blood Glucose.: 165 mg/dL (03 May 2019 07:36)    05-01 [id number] 7.8    RADIOLOGY & ADDITIONAL TESTS:    Care Discussed with Consultants/Other Providers:

## 2019-05-05 LAB
GLUCOSE BLDC GLUCOMTR-MCNC: 168 MG/DL — HIGH (ref 70–99)
GLUCOSE BLDC GLUCOMTR-MCNC: 175 MG/DL — HIGH (ref 70–99)
GLUCOSE BLDC GLUCOMTR-MCNC: 204 MG/DL — HIGH (ref 70–99)
GLUCOSE BLDC GLUCOMTR-MCNC: 212 MG/DL — HIGH (ref 70–99)

## 2019-05-05 PROCEDURE — 99232 SBSQ HOSP IP/OBS MODERATE 35: CPT

## 2019-05-05 RX ADMIN — Medication 4: at 12:02

## 2019-05-05 RX ADMIN — OXYCODONE HYDROCHLORIDE 10 MILLIGRAM(S): 5 TABLET ORAL at 19:01

## 2019-05-05 RX ADMIN — OXYCODONE HYDROCHLORIDE 10 MILLIGRAM(S): 5 TABLET ORAL at 01:14

## 2019-05-05 RX ADMIN — Medication 2: at 08:40

## 2019-05-05 RX ADMIN — Medication 2: at 16:43

## 2019-05-05 RX ADMIN — ENOXAPARIN SODIUM 40 MILLIGRAM(S): 100 INJECTION SUBCUTANEOUS at 13:04

## 2019-05-05 RX ADMIN — CELECOXIB 200 MILLIGRAM(S): 200 CAPSULE ORAL at 06:09

## 2019-05-05 RX ADMIN — CELECOXIB 200 MILLIGRAM(S): 200 CAPSULE ORAL at 07:09

## 2019-05-05 RX ADMIN — OXYCODONE HYDROCHLORIDE 10 MILLIGRAM(S): 5 TABLET ORAL at 00:14

## 2019-05-05 RX ADMIN — ATORVASTATIN CALCIUM 10 MILLIGRAM(S): 80 TABLET, FILM COATED ORAL at 20:47

## 2019-05-05 RX ADMIN — OXYCODONE HYDROCHLORIDE 10 MILLIGRAM(S): 5 TABLET ORAL at 18:01

## 2019-05-05 NOTE — PROGRESS NOTE ADULT - SUBJECTIVE AND OBJECTIVE BOX
Patient is a 48y old  Female who presents with a chief complaint of S/P ORIF Right Tibial Plateau Fx - 4/30/19 (04 May 2019 12:17)    Patient feels well.  Denies chest pain, sob, nausea, vomiting.      Patient seen and examined at bedside.    ALLERGIES:  No Known Allergies    MEDICATIONS  (STANDING):  atorvastatin 10 milliGRAM(s) Oral at bedtime  dextrose 50% Injectable 12.5 Gram(s) IV Push once  dextrose 50% Injectable 25 Gram(s) IV Push once  dextrose 50% Injectable 25 Gram(s) IV Push once  enoxaparin Injectable 40 milliGRAM(s) SubCutaneous daily  influenza   Vaccine 0.5 milliLiter(s) IntraMuscular once  insulin lispro (HumaLOG) corrective regimen sliding scale   SubCutaneous three times a day before meals  insulin lispro (HumaLOG) corrective regimen sliding scale   SubCutaneous at bedtime  melatonin 6 milliGRAM(s) Oral once    MEDICATIONS  (PRN):  dextrose 40% Gel 15 Gram(s) Oral once PRN Blood Glucose LESS THAN 70 milliGRAM(s)/deciliter  glucagon  Injectable 1 milliGRAM(s) IntraMuscular once PRN Glucose LESS THAN 70 milligrams/deciliter  HYDROmorphone  Injectable 0.5 milliGRAM(s) IV Push every 3 hours PRN Severe Pain (7 - 10)  oxyCODONE    IR 5 milliGRAM(s) Oral every 3 hours PRN Mild Pain (1 - 3)  oxyCODONE    IR 10 milliGRAM(s) Oral every 3 hours PRN Moderate Pain (4 - 6)    Vital Signs Last 24 Hrs  T(F): 97.8 (05 May 2019 05:42), Max: 97.9 (04 May 2019 16:41)  HR: 74 (05 May 2019 05:42) (66 - 85)  BP: 95/57 (05 May 2019 05:42) (92/49 - 98/52)  RR: 16 (05 May 2019 05:42) (16 - 17)  SpO2: 99% (05 May 2019 05:42) (94% - 99%)  I&O's Summary    04 May 2019 07:01  -  05 May 2019 07:00  --------------------------------------------------------  IN: 640 mL / OUT: 1 mL / NET: 639 mL    BMI (kg/m2): 31.7 (04-30-19 @ 14:15)  PHYSICAL EXAM:  General: NAD, A/O x 3  ENT: MMM  Neck: Supple, No JVD  Lungs: Clear to auscultation bilaterally  Cardio: RRR, S1/S2, No murmurs  Abdomen: Soft, Nontender, Nondistended; Bowel sounds present  Extremities: No calf tenderness, No pitting edema    LABS:             10.9   10.5  )-----------( 412      ( 03 May 2019 05:31 )             34.2     05-03  138  |  100  |  10  ----------------------------<  174  3.9   |  30  |  0.54    Ca    9.1      03 May 2019 05:31     eGFR if Non African American: 111 mL/min/1.73M2 (05-03-19 @ 05:31)  eGFR if African American: 129 mL/min/1.73M2 (05-03-19 @ 05:31)    CAPILLARY BLOOD GLUCOSE    POCT Blood Glucose.: 304 mg/dL (04 May 2019 21:40)  POCT Blood Glucose.: 188 mg/dL (04 May 2019 17:09)  POCT Blood Glucose.: 190 mg/dL (04 May 2019 11:53)  POCT Blood Glucose.: 173 mg/dL (04 May 2019 08:28)    05-01 [id number] 7.8    RADIOLOGY & ADDITIONAL TESTS:    Care Discussed with Consultants/Other Providers:

## 2019-05-05 NOTE — PROGRESS NOTE ADULT - ASSESSMENT
Pt is a 48M presented with fall admitted for right tibial plateau fracture s/p repair with Dr. Hazel.     Right tibial plateau fracture s/p repair  POD #5  Awaiting auth for [facility] placement  Mobilize w PT-Pt to remain NON-weight bearing x 3 month  No flexion to right knee x 2 more days  Wear knee immobilizer x 2 more days  F/U Dr. Hazel    Diabetes Type 2: SSI HgA1C 7.8%  -ISS  -Resume metformin as outpatient    Hyperlipidemia: continue statin    Thrombocytosis  - likely reactive  - monitor    DVT PPX: Lovenox    Still awaiting auth for d/c to [facility]

## 2019-05-05 NOTE — PROGRESS NOTE ADULT - SUBJECTIVE AND OBJECTIVE BOX
Procedure: S/P ORIF Right Tibial plateau  POD #: 5    S: Pt without complaints. No SOB,CP, N/V. Tolerated Diet well.  Pain comfortable (3-4/10 ) on  Interval Rx. Katerine Rx well.  [+BM], + flatus, No abdominal pain.  NWB Right-  Walked with walker  Pain Rx:  celecoxib 200 milliGRAM(s) Oral two times a day  HYDROmorphone  Injectable 0.5 milliGRAM(s) IV Push every 3 hours PRN  melatonin 6 milliGRAM(s) Oral once  oxyCODONE    IR 5 milliGRAM(s) Oral every 3 hours PRN  oxyCODONE    IR 10 milliGRAM(s) Oral every 3 hours PRN    O: General: On exam, No Apparent Distress  Vital Signs Stable, Afebrile    Lungs: BS clear bilat.  [Abdomen]: + BS, soft , benign exam     Ext(Knee): Right Knee [ Jimenes ] Dressing changed; [Incision] clean, dry, & intact]; Nylon sutures intact; no  dehiscence; No  cellulitis; Xeroform over incision ; Min.  effusion [ soft ]. Minimal soft tissue swelling knee/thigh; Ecchymosis lateral side noted.   ROM: Extension 0 deg. ; Flexion 0 deg. NO flexion allowed for at least 1 week Post-Op  Neurologic:  Has sensation over feet & toes bilat, though Right with slight diminished fine touch dorsum foot; harsh touch intact right foot, toes, & ankle. Full AROM bilat feet & toes. EHL/AT = 4-5/5 on Right and 5/5 on left  Vascular: Feet toes warm, pink. DP = 2+. Car refill brisk 1-2 sec bilat. toes. No calf tenderness bilat..    VTEP: On Bilat. Venodynes + enoxaparin Injectable 40 milliGRAM(s) SubCutaneous daily    Activity in PT yesterday Noted. [ Walked 30 ft. with walker with Contact guard]. [Sat up for 6 hours total].  Hospitalist input noted.    Primary Orthopedic Assessment:  • Stable from Orthopedic perspective  • Neuro motor exam stable - min decrease sensation foot due to Post Op effect      Plan:   • Continue:  PT/OT/ NWB with assistance of a walker/Ice to knee/ Knee immobilizer /elevation   • Continue DVT prophylaxis - Lovenox  • Continue Pain Rx  • Discharge planning – anticipated discharge is  Subacute Rehab facility per patient choice when authorization obtained.

## 2019-05-06 VITALS
SYSTOLIC BLOOD PRESSURE: 97 MMHG | OXYGEN SATURATION: 95 % | TEMPERATURE: 98 F | DIASTOLIC BLOOD PRESSURE: 76 MMHG | RESPIRATION RATE: 16 BRPM | HEART RATE: 79 BPM

## 2019-05-06 LAB
GLUCOSE BLDC GLUCOMTR-MCNC: 164 MG/DL — HIGH (ref 70–99)
GLUCOSE BLDC GLUCOMTR-MCNC: 172 MG/DL — HIGH (ref 70–99)
GLUCOSE BLDC GLUCOMTR-MCNC: 184 MG/DL — HIGH (ref 70–99)

## 2019-05-06 PROCEDURE — 85027 COMPLETE CBC AUTOMATED: CPT

## 2019-05-06 PROCEDURE — 97116 GAIT TRAINING THERAPY: CPT

## 2019-05-06 PROCEDURE — 80069 RENAL FUNCTION PANEL: CPT

## 2019-05-06 PROCEDURE — 85610 PROTHROMBIN TIME: CPT

## 2019-05-06 PROCEDURE — 80048 BASIC METABOLIC PNL TOTAL CA: CPT

## 2019-05-06 PROCEDURE — 73562 X-RAY EXAM OF KNEE 3: CPT

## 2019-05-06 PROCEDURE — 82962 GLUCOSE BLOOD TEST: CPT

## 2019-05-06 PROCEDURE — 99285 EMERGENCY DEPT VISIT HI MDM: CPT | Mod: 25

## 2019-05-06 PROCEDURE — 93970 EXTREMITY STUDY: CPT

## 2019-05-06 PROCEDURE — 86850 RBC ANTIBODY SCREEN: CPT

## 2019-05-06 PROCEDURE — 36415 COLL VENOUS BLD VENIPUNCTURE: CPT

## 2019-05-06 PROCEDURE — 85730 THROMBOPLASTIN TIME PARTIAL: CPT

## 2019-05-06 PROCEDURE — 86901 BLOOD TYPING SEROLOGIC RH(D): CPT

## 2019-05-06 PROCEDURE — 99239 HOSP IP/OBS DSCHRG MGMT >30: CPT

## 2019-05-06 PROCEDURE — C1713: CPT

## 2019-05-06 PROCEDURE — 86900 BLOOD TYPING SEROLOGIC ABO: CPT

## 2019-05-06 PROCEDURE — 83036 HEMOGLOBIN GLYCOSYLATED A1C: CPT

## 2019-05-06 PROCEDURE — C1889: CPT

## 2019-05-06 PROCEDURE — 80053 COMPREHEN METABOLIC PANEL: CPT

## 2019-05-06 PROCEDURE — 71045 X-RAY EXAM CHEST 1 VIEW: CPT

## 2019-05-06 PROCEDURE — 76000 FLUOROSCOPY <1 HR PHYS/QHP: CPT

## 2019-05-06 PROCEDURE — 93005 ELECTROCARDIOGRAM TRACING: CPT

## 2019-05-06 PROCEDURE — 97162 PT EVAL MOD COMPLEX 30 MIN: CPT

## 2019-05-06 RX ADMIN — OXYCODONE HYDROCHLORIDE 10 MILLIGRAM(S): 5 TABLET ORAL at 05:28

## 2019-05-06 RX ADMIN — OXYCODONE HYDROCHLORIDE 10 MILLIGRAM(S): 5 TABLET ORAL at 10:30

## 2019-05-06 RX ADMIN — ENOXAPARIN SODIUM 40 MILLIGRAM(S): 100 INJECTION SUBCUTANEOUS at 11:52

## 2019-05-06 RX ADMIN — OXYCODONE HYDROCHLORIDE 10 MILLIGRAM(S): 5 TABLET ORAL at 10:46

## 2019-05-06 RX ADMIN — Medication 2: at 11:48

## 2019-05-06 RX ADMIN — Medication 2: at 16:25

## 2019-05-06 RX ADMIN — OXYCODONE HYDROCHLORIDE 10 MILLIGRAM(S): 5 TABLET ORAL at 17:36

## 2019-05-06 RX ADMIN — Medication 2: at 08:49

## 2019-05-06 NOTE — CHART NOTE - NSCHARTNOTEFT_GEN_A_CORE
NP called pt. at home in order to rectify discharge instructions (given today - 5/6/19), after Surgical BONNY Max brought to the NP attention  that the discharge instructions are incorrect.   Pt. was told by the NP that she does not need lovenox or full dose aspirin. She has to take ASA 81 mg po bid x 6 weeks, instead.  NP also reiterated to the pt. NTB in the right leg and to follow up with ortho surgery - Dr. Hazel.  Pt. verbalized understanding.   NP fluent in Togolese - had the conversations in Togolese with the pt, to make sure the pt. has a full understanding of the discharge instructions.

## 2019-05-06 NOTE — PROGRESS NOTE ADULT - ASSESSMENT
Pt is a 48M presented with fall admitted for right tibial plateau fracture s/p repair with Dr. Hazel.     Right tibial plateau fracture s/p repair  POD #6  Awaiting auth for [facility] placement  Mobilize w PT-Pt to remain NON-weight bearing x 3 month  No flexion to right knee x 2 more days  Wear knee immobilizer x 2 more days  F/U Dr. Hazel    Diabetes Type 2: SSI HgA1C 7.8%  -ISS  -Resume metformin as outpatient    Hyperlipidemia: continue statin    Thrombocytosis  - likely reactive  - monitor    DVT PPX: Lovenox    Still awaiting auth for d/c to [facility]

## 2019-05-06 NOTE — PROGRESS NOTE ADULT - REASON FOR ADMISSION
Fall, fracture
S/P ORIF Right Tibial Plateau Fx - 4/30/19
S/P ORIF Right Tibial Plateau Fx 4-30-19
Fall, fracture

## 2019-05-06 NOTE — PROGRESS NOTE ADULT - ASSESSMENT
Surgically stable Surgically stable   depression  hld, Diabetes    PLan:  NWB right leg            Lovenox for DVT prophylaxis then when discharged  ASA 81 mg po bid for 6 weeks            Follow up with Dr Hazel in one week            May begin gentle flexion tomorrow as tolerated           Wear immobilizer when ambulating for stability

## 2019-05-06 NOTE — CHART NOTE - NSCHARTNOTEFT_GEN_A_CORE
Due to the patient's fracture of the right tibial plateau and non-weight bearing status, patient requires a standard wheelchair with elevated leg rests for ADLS within the home.

## 2019-05-06 NOTE — PROGRESS NOTE ADULT - PROVIDER SPECIALTY LIST ADULT
Hospitalist
Orthopedics
Surgery
Surgery
Hospitalist
Hospitalist

## 2019-05-06 NOTE — PROGRESS NOTE ADULT - SUBJECTIVE AND OBJECTIVE BOX
Surgery PA Note:  POD #6    S/P ORIF Right Tibial Plateau Fracture     Patient was seen and examined by me.  Patient is sitting up in bed without complaints this am.  Her pain is manageable and she states that she wants to go home.  She is awaiting authorization for [facility] .    Vital Signs Last 24 Hrs  T(C): 36.8 (06 May 2019 10:00), Max: 37 (06 May 2019 05:21)  T(F): 98.3 (06 May 2019 10:00), Max: 98.6 (06 May 2019 05:21)  HR: 72 (06 May 2019 10:00) (64 - 74)  BP: 90/52 (06 May 2019 10:00) (90/52 - 105/53)  BP(mean): --  RR: 16 (06 May 2019 10:00) (16 - 16)  SpO2: 97% (06 May 2019 05:21) (94% - 98%)     PE:  Alert and oriented x3   Lungs:  CTA   Cor:  S1S2  Abd:  soft, non tender +BS +BM voiding , tolerating a regular diet   Ext:  Right Knee dressing changed , Incision clean, dry , intact.  There are nylon sutures in place.  There is ecchymosis   along lateral leg.  Thigh is soft, calf is soft.  +EHL/FHL 5/5, + capillary refill < than 3 sec. , + pulses     labs stable

## 2019-05-06 NOTE — PROGRESS NOTE ADULT - SUBJECTIVE AND OBJECTIVE BOX
Patient is a 48y old  Female who presents with a chief complaint of S/P ORIF Right Tibial Plateau Fx 4-30-19 (05 May 2019 13:57)      Patient seen and examined at bedside. feels well, no complaints  pain controlled     ALLERGIES:  No Known Allergies    MEDICATIONS:  influenza   Vaccine 0.5 milliLiter(s) IntraMuscular once  melatonin 6 milliGRAM(s) Oral once    Vital Signs Last 24 Hrs  T(F): 98.6 (06 May 2019 05:21), Max: 98.6 (06 May 2019 05:21)  HR: 64 (06 May 2019 05:21) (64 - 74)  BP: 105/50 (06 May 2019 05:21) (104/46 - 105/53)  RR: 16 (06 May 2019 05:21) (16 - 16)  SpO2: 97% (06 May 2019 05:21) (94% - 98%)  I&O's Summary    05 May 2019 07:01  -  06 May 2019 07:00  --------------------------------------------------------  IN: 1200 mL / OUT: 1 mL / NET: 1199 mL        PHYSICAL EXAM:  General: NAD, AO x 3  Neck: Supple, No JVD  Lungs: Clear to auscultation bilaterally  Cardio: RRR, S1/S2, No murmurs  Abdomen: Soft, Nontender, Nondistended; Bowel sounds present  Extremities: No clubbing, cyanosis, or edema. immobilizer on right leg      LABS:  CAPILLARY BLOOD GLUCOSE      POCT Blood Glucose.: 172 mg/dL (06 May 2019 07:42)  POCT Blood Glucose.: 204 mg/dL (05 May 2019 20:43)  POCT Blood Glucose.: 175 mg/dL (05 May 2019 16:32)  POCT Blood Glucose.: 212 mg/dL (05 May 2019 11:53)    05-01 [id number] 7.8    RADIOLOGY & ADDITIONAL TESTS:    Care Discussed with Consultants/Other Providers:

## 2019-05-09 PROBLEM — E11.9 TYPE 2 DIABETES MELLITUS WITHOUT COMPLICATIONS: Chronic | Status: ACTIVE | Noted: 2019-04-30

## 2019-05-09 PROBLEM — F32.9 MAJOR DEPRESSIVE DISORDER, SINGLE EPISODE, UNSPECIFIED: Chronic | Status: ACTIVE | Noted: 2019-04-30

## 2019-05-09 PROBLEM — E78.5 HYPERLIPIDEMIA, UNSPECIFIED: Chronic | Status: ACTIVE | Noted: 2019-04-30

## 2019-05-14 ENCOUNTER — APPOINTMENT (OUTPATIENT)
Dept: ORTHOPEDIC SURGERY | Facility: CLINIC | Age: 49
End: 2019-05-14
Payer: MEDICARE

## 2019-05-14 PROCEDURE — 99024 POSTOP FOLLOW-UP VISIT: CPT

## 2019-05-14 RX ORDER — IBUPROFEN 800 MG/1
800 TABLET, FILM COATED ORAL
Qty: 30 | Refills: 3 | Status: ACTIVE | COMMUNITY
Start: 2019-05-14 | End: 1900-01-01

## 2019-05-16 NOTE — END OF VISIT
[FreeTextEntry3] : I, Chad Hazel MD, ordering physician, have read and attest that all the information, medical decision making and discharge instructions within are true and accurate.

## 2019-05-16 NOTE — HISTORY OF PRESENT ILLNESS
[de-identified] : s/p ORIF right tibial plateau on 04/30/2019 [de-identified] : Patient is WDWN, alert, and in no acute distress. Breathing is unlabored. She is grossly oriented to person, place, and time. \par \par Knee immobilizer was removed. Incision is healed. There are no signs of infection. Sutures were removed. Edema and tenderness are present. \par ROM: 0-60 °  [de-identified] : The patient returns for the 1st postoperative visit after undergoing ORIF right tibial plateau at [facility]. Patient was brought into the ED after being transferred from [facility] with a history of a fall at home. Xrays revealed a right bicondylar tibial plateau fracture. The surgery was on 04/30/2019. She reports mild postoperative pain. The patient is recovering at home. She is NWB in a wheelchair with a knee immobilizer. She presents today for suture removal.  [de-identified] : No imaging done today.  [de-identified] : A script for 800mg Ibuprofen was provided. Massage the scar with vitamin E oil once the strips have fallen off. Gentle range of motion exercises were encouraged. Remain NWB in the immobilizer. Follow up in 4 weeks. Xrays upon return.

## 2019-05-16 NOTE — ADDENDUM
[FreeTextEntry1] : I, Angella Kevin wrote this note acting as a scribe for Dr. Chad Hazel on May 14, 2019.

## 2019-06-06 ENCOUNTER — EMERGENCY (EMERGENCY)
Facility: HOSPITAL | Age: 49
LOS: 1 days | Discharge: HOME | End: 2019-06-06
Attending: EMERGENCY MEDICINE
Payer: MEDICARE

## 2019-06-06 VITALS
HEIGHT: 63 IN | RESPIRATION RATE: 18 BRPM | OXYGEN SATURATION: 97 % | DIASTOLIC BLOOD PRESSURE: 76 MMHG | TEMPERATURE: 99 F | HEART RATE: 88 BPM | SYSTOLIC BLOOD PRESSURE: 109 MMHG | WEIGHT: 167.99 LBS

## 2019-06-06 DIAGNOSIS — Z98.891 HISTORY OF UTERINE SCAR FROM PREVIOUS SURGERY: Chronic | ICD-10-CM

## 2019-06-06 DIAGNOSIS — M25.571 PAIN IN RIGHT ANKLE AND JOINTS OF RIGHT FOOT: Chronic | ICD-10-CM

## 2019-06-06 DIAGNOSIS — Z98.89 OTHER SPECIFIED POSTPROCEDURAL STATES: Chronic | ICD-10-CM

## 2019-06-06 DIAGNOSIS — Z98.890 OTHER SPECIFIED POSTPROCEDURAL STATES: Chronic | ICD-10-CM

## 2019-06-06 DIAGNOSIS — Z98.51 TUBAL LIGATION STATUS: Chronic | ICD-10-CM

## 2019-06-06 DIAGNOSIS — N60.02 SOLITARY CYST OF LEFT BREAST: Chronic | ICD-10-CM

## 2019-06-06 PROCEDURE — 99283 EMERGENCY DEPT VISIT LOW MDM: CPT | Mod: 25

## 2019-06-06 PROCEDURE — 99283 EMERGENCY DEPT VISIT LOW MDM: CPT

## 2019-06-06 PROCEDURE — 73590 X-RAY EXAM OF LOWER LEG: CPT | Mod: 26,RT

## 2019-06-06 PROCEDURE — 73590 X-RAY EXAM OF LOWER LEG: CPT

## 2019-06-06 RX ORDER — IBUPROFEN 200 MG
1 TABLET ORAL
Qty: 20 | Refills: 0
Start: 2019-06-06 | End: 2019-06-10

## 2019-06-06 RX ORDER — IBUPROFEN 200 MG
600 TABLET ORAL ONCE
Refills: 0 | Status: COMPLETED | OUTPATIENT
Start: 2019-06-06 | End: 2019-06-06

## 2019-06-06 RX ADMIN — Medication 600 MILLIGRAM(S): at 19:20

## 2019-06-06 RX ADMIN — Medication 1 TABLET(S): at 21:33

## 2019-06-06 RX ADMIN — Medication 600 MILLIGRAM(S): at 19:57

## 2019-06-06 NOTE — ED ADULT NURSE NOTE - NSIMPLEMENTINTERV_GEN_ALL_ED
Implemented All Fall Risk Interventions:  [place] to call system. Call bell, personal items and telephone within reach. Instruct patient to call for assistance. Room bathroom lighting operational. Non-slip footwear when patient is off stretcher. Physically safe environment: no spills, clutter or unnecessary equipment. Stretcher in lowest position, wheels locked, appropriate side rails in place. Provide visual cue, wrist band, yellow gown, etc. Monitor gait and stability. Monitor for mental status changes and reorient to person, place, and time. Review medications for side effects contributing to fall risk. Reinforce activity limits and safety measures with patient and family.

## 2019-06-06 NOTE — ED PROVIDER NOTE - PSH
Ankle arthralgia, right  surgey  Breast cyst, left    H/O knee surgery    H/O:     S/P  section  twice  S/P knee surgery  right  S/P knee surgery  right knee twice  S/P plastic surgery  abdominalplasty  S/P shoulder surgery  right  S/P tubal ligation  17 years ago

## 2019-06-06 NOTE — ED PROVIDER NOTE - NSFOLLOWUPINSTRUCTIONS_ED_ALL_ED_FT
rest, hydrate well.    medication as prescribed  wash with soap and water, clean dry bandage  follow up with orthopedics    Cellulitis    Cellulitis is a skin infection caused by bacteria. This condition occurs most often in the arms and lower legs but can occur anywhere over the body. Symptoms include redness, swelling, warm skin, tenderness, and chills/fever. If you were prescribed an antibiotic medicine, take it as told by your health care provider. Do not stop taking the antibiotic even if you start to feel better.    SEEK IMMEDIATE MEDICAL CARE IF YOU HAVE ANY OF THE FOLLOWING SYMPTOMS: worsening fever, red streaks coming from affected area, vomiting or diarrhea, or dizziness/lightheadedness.

## 2019-06-06 NOTE — ED PROVIDER NOTE - OBJECTIVE STATEMENT
49 y/o F patient with a significant PMHx of Depression, DM, HLD, and a significant PSHx of ankle arthralgia, breast cyst, knee surgery, , knee surgery, shoulder surgery, and tubal ligation presents to the ED for a wound check. Patient reports undergoing tibial plateau surgery x1 month ago at [facility] s/p fall. Patient says her PMD advised her to present to the emergency room for further evaluation. Patient says she noticed redness, drainage, and a foul smell coming from the site. Patient denies any other complaints. NKDA.

## 2019-06-06 NOTE — ED PROVIDER NOTE - CLINICAL SUMMARY MEDICAL DECISION MAKING FREE TEXT BOX
49 y/o s/p tibial plateau fracture. Suture removed x3 weeks ago, likely superficial infection. Obtain X-ray, analgesia, and consult ortho.

## 2019-06-06 NOTE — ED PROVIDER NOTE - CARE PROVIDER_API CALL
Chad Hazel)  Orthopaedic Surgery  [address], [address]  [place] [address]  Phone: [phone]  Fax: [phone]  Follow Up Time: 1-3 Days

## 2019-06-13 ENCOUNTER — APPOINTMENT (OUTPATIENT)
Dept: ORTHOPEDIC SURGERY | Facility: CLINIC | Age: 49
End: 2019-06-13
Payer: MEDICARE

## 2019-06-13 PROCEDURE — 73562 X-RAY EXAM OF KNEE 3: CPT | Mod: RT

## 2019-06-13 PROCEDURE — 99024 POSTOP FOLLOW-UP VISIT: CPT

## 2019-06-13 NOTE — ADDENDUM
[FreeTextEntry1] : I, Angella Kevin wrote this note acting as a scribe for Dr. Chad Hazel on Jun 13, 2019.

## 2019-07-19 NOTE — ED PROCEDURE NOTE - NS ED PERI NEURO NEG
The patient/caregiver verbalized understanding of how to care for the injured extremity with splint/Pre-application: Motor, sensory, and vascular responses intact in the injured extremity./Post-application: Motor, sensory, and vascular responses intact in the injured extremity. Vaginal Delivery

## 2019-07-25 ENCOUNTER — APPOINTMENT (OUTPATIENT)
Dept: ORTHOPEDIC SURGERY | Facility: CLINIC | Age: 49
End: 2019-07-25
Payer: MEDICARE

## 2019-07-25 DIAGNOSIS — S82.141D DISPLACED BICONDYLAR FRACTURE OF RIGHT TIBIA, SUBSEQUENT ENCOUNTER FOR CLOSED FRACTURE WITH ROUTINE HEALING: ICD-10-CM

## 2019-07-25 PROCEDURE — 73564 X-RAY EXAM KNEE 4 OR MORE: CPT | Mod: RT

## 2019-07-25 PROCEDURE — 99024 POSTOP FOLLOW-UP VISIT: CPT

## 2019-07-25 RX ORDER — IBUPROFEN 800 MG/1
800 TABLET, FILM COATED ORAL
Qty: 60 | Refills: 0 | Status: ACTIVE | COMMUNITY
Start: 2019-07-25 | End: 1900-01-01

## 2019-07-25 NOTE — HISTORY OF PRESENT ILLNESS
[de-identified] : s/p ORIF right tibial plateau on 04/30/2019 [de-identified] : The patient returns for the 3rd postoperative visit after undergoing ORIF right tibial plateau at [facility]. Patient was brought into the ED after being transferred from [facility] with a history of a fall at home. Xrays revealed a right bicondylar tibial plateau fracture. The surgery was on 04/30/2019. She reports mild postoperative pain. The patient is recovering at home. She is WBAT with a cane. [de-identified] : Patient is WDWN, alert, and in no acute distress. Breathing is unlabored. She is grossly oriented to person, place, and time. \par \par Incision is healed. There are no signs of infection. Edema and tenderness are present. \par ROM: 0-60 °  [de-identified] : AP, lateral, skyline, and tunnel views of the right knee were obtained and revealed a well-aligned tibial plateau fracture with a plate and multiple screws in place.  [de-identified] : A script for a hinge knee brace was provided. Walking and physical activity were encouraged. Massage the scar with vitamin E oil. Gentle range of motion exercises were encouraged. Follow up in 3 months. 4 view knee  Xrays upon return.

## 2019-07-25 NOTE — ADDENDUM
[FreeTextEntry1] : I, Angella Kevin wrote this note acting as a scribe for Dr. Chad Hazel on Jul 25, 2019.

## 2020-03-19 NOTE — ASU DISCHARGE PLAN (ADULT/PEDIATRIC). - B. DRINK ALCOHOL, BEER, OR WINE
Prescription approved per G Refill Protocol or patient Primary care provider (PCP) Chart and last OV reviewed   NAYELI Melo RN/Miguel Leiva         Statement Selected

## 2020-04-30 NOTE — ED PROVIDER NOTE - NS ED MD EM SELECTION
Madelyn Baldwin needs to be seen for an appointment before further refills are given.   [id number] Exp Problem Focused - Mod. Complex

## 2020-11-10 NOTE — PATIENT PROFILE ADULT - IS THERE A SUSPICION OF ABUSE/NEGLIGENCE?
Follow up with Dr. Solis in 1 week for removal of seton drains  You should also follow up with an outpatient urologist for bladder findings on CT. no

## 2021-01-23 NOTE — ED PROVIDER NOTE - TOBACCO USE
Rest.  Plenty of fluids.  Call your PCP on Monday for follow-up.  Return to the emergency department if acutely worse.   Current some day smoker

## 2021-03-01 NOTE — ED ADULT NURSE NOTE - PRO INTERPRETER NEED 2
Try Melatonin for sleep. If that does not help, please notify office and we will try Mirtazapine.    Try to cut back on Coffee intake, add more water to diet.     Bring in lab results after you have them completed at work.     Continue with routine exercise.     English

## 2021-05-23 ENCOUNTER — TRANSCRIPTION ENCOUNTER (OUTPATIENT)
Age: 51
End: 2021-05-23

## 2021-06-22 ENCOUNTER — EMERGENCY (EMERGENCY)
Facility: HOSPITAL | Age: 51
LOS: 1 days | Discharge: HOME | End: 2021-06-22
Attending: EMERGENCY MEDICINE
Payer: MEDICARE

## 2021-06-22 VITALS
SYSTOLIC BLOOD PRESSURE: 103 MMHG | WEIGHT: 159.84 LBS | DIASTOLIC BLOOD PRESSURE: 63 MMHG | OXYGEN SATURATION: 100 % | TEMPERATURE: 98 F | HEART RATE: 70 BPM | RESPIRATION RATE: 17 BRPM | HEIGHT: 63 IN

## 2021-06-22 DIAGNOSIS — Z98.89 OTHER SPECIFIED POSTPROCEDURAL STATES: Chronic | ICD-10-CM

## 2021-06-22 DIAGNOSIS — Z98.51 TUBAL LIGATION STATUS: Chronic | ICD-10-CM

## 2021-06-22 DIAGNOSIS — N60.02 SOLITARY CYST OF LEFT BREAST: Chronic | ICD-10-CM

## 2021-06-22 DIAGNOSIS — Z98.890 OTHER SPECIFIED POSTPROCEDURAL STATES: Chronic | ICD-10-CM

## 2021-06-22 DIAGNOSIS — M25.571 PAIN IN RIGHT ANKLE AND JOINTS OF RIGHT FOOT: Chronic | ICD-10-CM

## 2021-06-22 DIAGNOSIS — Z98.891 HISTORY OF UTERINE SCAR FROM PREVIOUS SURGERY: Chronic | ICD-10-CM

## 2021-06-22 PROCEDURE — 99283 EMERGENCY DEPT VISIT LOW MDM: CPT

## 2021-06-22 PROCEDURE — 73562 X-RAY EXAM OF KNEE 3: CPT | Mod: 26,50

## 2021-06-22 PROCEDURE — 99284 EMERGENCY DEPT VISIT MOD MDM: CPT | Mod: 25

## 2021-06-22 PROCEDURE — 73562 X-RAY EXAM OF KNEE 3: CPT

## 2021-06-22 RX ORDER — OXYCODONE AND ACETAMINOPHEN 5; 325 MG/1; MG/1
1 TABLET ORAL ONCE
Refills: 0 | Status: DISCONTINUED | OUTPATIENT
Start: 2021-06-22 | End: 2021-06-22

## 2021-06-22 RX ADMIN — OXYCODONE AND ACETAMINOPHEN 1 TABLET(S): 5; 325 TABLET ORAL at 15:15

## 2021-06-22 RX ADMIN — OXYCODONE AND ACETAMINOPHEN 1 TABLET(S): 5; 325 TABLET ORAL at 14:36

## 2021-06-22 NOTE — ED PROVIDER NOTE - OBJECTIVE STATEMENT
51 y/o F patient with a significant PMHx of DM on metformin and significant PSHx of screw to the right leg and ligaments to the left leg presents to the ED with c/o bilateral knee pain x 1.5 months. No fever and chills. Patient reports being unable to walk. Pt reports taking Tylenol and Voltaren for the pain.

## 2021-06-22 NOTE — ED PROVIDER NOTE - PATIENT PORTAL LINK FT
You can access the FollowMyHealth Patient Portal offered by [facility] by registering at the following website: [email or web address]. By joining [facility]’s FollowMyHealth portal, you will also be able to view your health information using other applications (apps) compatible with our system.

## 2021-06-22 NOTE — ED ADULT NURSE NOTE - OBJECTIVE STATEMENT
As per pt, c/o BL knee pain x1.5 months. Pt unable to ambulate. Pt reports taking Tylenol and Voltaren for the pain. Ptd denies h/a, SOB, f/c, n/v/d, CP, or cough.

## 2021-06-22 NOTE — ED PROVIDER NOTE - CLINICAL SUMMARY MEDICAL DECISION MAKING FREE TEXT BOX
Will do XR of knees. Will give percocet for the pain. Will do XR of knees. Will give percocet for the pain.  will discharge on percocet

## 2021-07-27 ENCOUNTER — TRANSCRIPTION ENCOUNTER (OUTPATIENT)
Age: 51
End: 2021-07-27

## 2021-09-19 ENCOUNTER — EMERGENCY (EMERGENCY)
Facility: HOSPITAL | Age: 51
LOS: 1 days | Discharge: HOME | End: 2021-09-19
Attending: STUDENT IN AN ORGANIZED HEALTH CARE EDUCATION/TRAINING PROGRAM
Payer: MEDICARE

## 2021-09-19 VITALS
SYSTOLIC BLOOD PRESSURE: 106 MMHG | OXYGEN SATURATION: 98 % | HEART RATE: 68 BPM | WEIGHT: 154.98 LBS | DIASTOLIC BLOOD PRESSURE: 71 MMHG | TEMPERATURE: 98 F | RESPIRATION RATE: 17 BRPM | HEIGHT: 63 IN

## 2021-09-19 DIAGNOSIS — Z98.89 OTHER SPECIFIED POSTPROCEDURAL STATES: Chronic | ICD-10-CM

## 2021-09-19 DIAGNOSIS — Z98.890 OTHER SPECIFIED POSTPROCEDURAL STATES: Chronic | ICD-10-CM

## 2021-09-19 DIAGNOSIS — Z98.51 TUBAL LIGATION STATUS: Chronic | ICD-10-CM

## 2021-09-19 DIAGNOSIS — N60.02 SOLITARY CYST OF LEFT BREAST: Chronic | ICD-10-CM

## 2021-09-19 DIAGNOSIS — M25.571 PAIN IN RIGHT ANKLE AND JOINTS OF RIGHT FOOT: Chronic | ICD-10-CM

## 2021-09-19 DIAGNOSIS — Z98.891 HISTORY OF UTERINE SCAR FROM PREVIOUS SURGERY: Chronic | ICD-10-CM

## 2021-09-19 PROCEDURE — 99283 EMERGENCY DEPT VISIT LOW MDM: CPT

## 2021-09-19 RX ORDER — FLUCONAZOLE 150 MG/1
1 TABLET ORAL
Qty: 4 | Refills: 0
Start: 2021-09-19 | End: 2021-10-16

## 2021-09-19 NOTE — ED PROVIDER NOTE - PHYSICAL EXAMINATION
GEN:   comfortable, in no apparent distress, AOx3  EYES:   PERRL, extra-occular movements intact  CV:   regular rate and rhythm, normal S1/S2, no murmur  RESP:   clear to auscultation bilaterally, non-labored, speaking in full sentences  ABD:   soft, non tender, no guarding  :   no cva tenderness  MSK:   no musculoskeletal tenderness, 5/5 strength, moving all extremities  SKIN:   dry, intact, oval shaped ulerythematous base rash behind right ear and scaling to right auricle of ear, left chest wall, right hip and right lower back scaling type oval rash. no surrounding warmth, erythema, streaking.  NEURO:   AOx3, no focal weakness or loss of sensation, gait normal, GCS 15  PSYCH: calm, cooperative, no apparent risk to self and others

## 2021-09-19 NOTE — ED PROVIDER NOTE - NSICDXPASTMEDICALHX_GEN_ALL_CORE_FT
PAST MEDICAL HISTORY:  Depression     Diabetes     Diabetes mellitus     HLD (hyperlipidemia)     HLD (hyperlipidemia)

## 2021-09-19 NOTE — ED PROVIDER NOTE - NSFOLLOWUPCLINICS_GEN_ALL_ED_FT
[facility]  Dermatology  [address] [place] [address]  Phone: [phone]  Fax: [phone]

## 2021-09-19 NOTE — ED PROVIDER NOTE - OBJECTIVE STATEMENT
50 year old female hx of DM, HLD presents for rash x5 days.  States itchy and at times painful, started on right ear and behind right ear, then went to left chest wall and right lower back.  No fevers, chills, known allergies.

## 2021-09-19 NOTE — ED PROVIDER NOTE - NSICDXPASTSURGICALHX_GEN_ALL_CORE_FT
PAST SURGICAL HISTORY:  Ankle arthralgia, right surgey    Breast cyst, left     H/O knee surgery     H/O:      S/P  section twice    S/P knee surgery right knee twice    S/P knee surgery right    S/P plastic surgery abdominalplasty    S/P shoulder surgery right    S/P tubal ligation 17 years ago

## 2021-09-19 NOTE — ED PROVIDER NOTE - PATIENT PORTAL LINK FT
You can access the FollowMyHealth Patient Portal offered by [facility] by registering at the following website: [email or web address]. By joining [facility]’s FollowMyHealth portal, you will also be able to view your health information using other applications (apps) compatible with our system.

## 2021-09-19 NOTE — ED PROVIDER NOTE - ATTENDING CONTRIBUTION TO CARE
I performed the initial face to face bedside interview with this patient regarding history of present illness, review of symptoms and past medical, social and family history.  I completed an independent physical examination.  I was the initial provider who evaluated this patient.  The history, review of symptoms and examination was documented by the scribe in my presence and I attest to the accuracy of the documentation.  I have signed out the follow up of any pending tests (i.e. labs, radiological studies) to the PA/NP.  I have discussed the patient’s plan of care and disposition with the PA/NP.   well appearing female, no acute distress, fungal rash along right ear, center chest and right hip. no lesions in ears. will treat.

## 2021-09-28 NOTE — ED ADULT TRIAGE NOTE - BSA (M2)
1.8 Azithromycin Pregnancy And Lactation Text: This medication is considered safe during pregnancy and is also secreted in breast milk.

## 2022-10-04 NOTE — PATIENT PROFILE ADULT - NSASFALLNEEDSASSIST_GEN_A_NUR
Occupational Therapy    Visit Type: discharge  Precautions:  Medical precautions:  fall risk; standard precautions.  Pt admitted due to SOB, hypoxia, and acute on chronic congestive heart failure. 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   Lines:     Basic: telemetry and capped IV (4 liters)      Lines in chart and on patient reviewed, precautions maintained throughout session.  Lower Extremity:    Pt wears prosthesis on L lower extremity,   Safety Measures: chair alarm  SUBJECTIVE  Patient agreed to participate in therapy this date.  Pt stated \" I hope to go home today\"  Patient / Family Goal: return to previous functional status     OBJECTIVE             Functional Ambulation  - Assistance: modified independent  - Assistive device: 2-wheeled walker  - Distance (ft):20; 20  - Surface: even  Activities of Daily Living (ADLs)  Grooming/Oral Hygiene:   - Grooming assist: modified independent       - Oral hygiene assist: modified independent  - Position: chair  - Assist needed for: set up  Upper Body Dressing:  - Assist: modified independent  Lower Body Dressing:   - Assist: modified independent  - Position: chair  - Footwear:       - Assistance: modified independent  Toileting:   - Assist: modified independent  Bathing:   - Assist: modified independent  - Position: chair             ASSESSMENT  Patient seen on es Nursing Unit.     Today's treatment session focused on sponge bathing, dressing, toileting, oral cares, grooming. Pt is at a Mod I level with sponge bathing, toileting, oral cares and grooming. Pt able to wipe self following BM without difficulty. Pt able to mounika and doff prosthetic ( mother assists at times with placement). Pt completed oral cares at a Mod I level with set-up. It appears pt will be able to return home when medically stable.    Skilled OT indicated to address the above deficits.           Discharge Recommendations:  Recommendation for Discharge Location: OT WI: Home without therapy needs  Recommendation for Discharge Support: OT WI: Intermittent assist daily  PT/OT Mobility Equipment for Discharge: has 2ww  PT/OT ADL Equipment for Discharge: Has shower chair  OT Identified Barriers to Discharge: Activity tolerance, medical acuity (O2 needs), balance             • Skilled therapy is not required due to pt being at baseline    Patient at End of Session:   Location: in chair  Safety measures: call light within reach    PLAN  Suggestions for next session as indicated: Discharge OT      GOALS  Review Date: 10/6/2022  Long Term Goals: (to be met by time of discharge from hospital)  Grooming: Patient will complete grooming tasks independent. Status: met   Upper body dressing: Patient will complete upper body dressing independent. Status: met   Lower body dressing: Patient will complete lower body dressing modified independent. Status: met   Toileting: Patient will complete toileting modified independent.  Status: met   Bathing: Patient will complete bathingmodified independent  Status: met Toilet transfer: Patient will complete toilet transfer with 2-wheeled walker, modified independent.  Status: met   Home setting transfer: Patient will complete home setting transfers with 2-wheeled walker, modified independent.   Status: met         Documented in the chart in the following areas: Assessment. Plan.      Therapy procedure time and total treatment time can be found documented on the Time Entry flowsheet   yes

## 2022-12-16 NOTE — ASU DISCHARGE PLAN (ADULT/PEDIATRIC). - NS DC INTERPRETER YES NO
Teena Carole Procedure Request Form: Exclusion: Bilateral Masectomy      Date Requested: 23  Patient: Tony Cesar  Patient : [id number]   Referring Provider: Sae James MD        Date of Procedure ______________________________       The above patient has informed us that they have completed their   most recent Exclusion: Bilateral Masectomy at your facility  Please complete   this form and attach all corresponding procedure reports/results  Comments __________________________________________________________  ____________________________________________________________________  ____________________________________________________________________  ____________________________________________________________________    Facility Completing Procedure _________________________________________    Form Completed By (print name) _______________________________________      Signature __________________________________________________________      These reports are needed for  compliance  Please fax this completed form and a copy of the procedure report to our office located at [place] [address] as soon as possible to [phone] yeison Carbone: Phone [phone]    We thank you for your assistance in treating our mutual patient     [facility]  AND [facility] - [phone] F [phone] No

## 2022-12-19 NOTE — ED PROVIDER NOTE - MUSCULOSKELETAL, MLM
Swelling to proximal, lateral aspect of right calf. Patella appears proximally over distal femur. Pt unable to perform any movement with right leg. Hips intact. NV intact. 3 = A little assistance

## 2023-04-14 NOTE — OCCUPATIONAL THERAPY INITIAL EVALUATION ADULT - BATHING, PREVIOUS LEVEL OF FUNCTION, OT EVAL
Medical Necessity Clause: This procedure was medically necessary because the lesions that were treated were: Medical Necessity Information: It is in your best interest to select a reason for this procedure from the list below. All of these items fulfill various CMS LCD requirements except the new and changing color options. Number Of Freeze-Thaw Cycles: 2 freeze-thaw cycles Render Post-Care Instructions In Note?: no Post-Care Instructions: I reviewed with the patient in detail post-care instructions. Patient is to wear sunprotection, and avoid picking at any of the treated lesions. Pt may apply Vaseline to crusted or scabbing areas. Consent: The patient's consent was obtained including but not limited to risks of crusting, scabbing, blistering, scarring, darker or lighter pigmentary change, recurrence, incomplete removal and infection. Duration Of Freeze Thaw-Cycle (Seconds): 5-10 by home health aide/needed assist Include Z78.9 (Other Specified Conditions Influencing Health Status) As An Associated Diagnosis?: Yes Detail Level: Detailed Other, specify...

## 2023-04-25 ENCOUNTER — EMERGENCY (EMERGENCY)
Facility: HOSPITAL | Age: 53
LOS: 1 days | Discharge: HOME | End: 2023-04-25
Attending: STUDENT IN AN ORGANIZED HEALTH CARE EDUCATION/TRAINING PROGRAM
Payer: MEDICARE

## 2023-04-25 VITALS
SYSTOLIC BLOOD PRESSURE: 112 MMHG | HEART RATE: 70 BPM | TEMPERATURE: 98 F | RESPIRATION RATE: 18 BRPM | OXYGEN SATURATION: 100 % | DIASTOLIC BLOOD PRESSURE: 62 MMHG

## 2023-04-25 VITALS
RESPIRATION RATE: 20 BRPM | HEIGHT: 61 IN | TEMPERATURE: 97 F | WEIGHT: 147.05 LBS | OXYGEN SATURATION: 98 % | HEART RATE: 76 BPM | SYSTOLIC BLOOD PRESSURE: 107 MMHG | DIASTOLIC BLOOD PRESSURE: 65 MMHG

## 2023-04-25 DIAGNOSIS — Z98.89 OTHER SPECIFIED POSTPROCEDURAL STATES: Chronic | ICD-10-CM

## 2023-04-25 DIAGNOSIS — M25.571 PAIN IN RIGHT ANKLE AND JOINTS OF RIGHT FOOT: Chronic | ICD-10-CM

## 2023-04-25 DIAGNOSIS — Z98.890 OTHER SPECIFIED POSTPROCEDURAL STATES: Chronic | ICD-10-CM

## 2023-04-25 DIAGNOSIS — Z98.891 HISTORY OF UTERINE SCAR FROM PREVIOUS SURGERY: Chronic | ICD-10-CM

## 2023-04-25 DIAGNOSIS — N60.02 SOLITARY CYST OF LEFT BREAST: Chronic | ICD-10-CM

## 2023-04-25 DIAGNOSIS — Z98.51 TUBAL LIGATION STATUS: Chronic | ICD-10-CM

## 2023-04-25 LAB
ALBUMIN SERPL ELPH-MCNC: 3.4 G/DL — LOW (ref 3.5–5)
ALP SERPL-CCNC: 67 U/L — SIGNIFICANT CHANGE UP (ref 40–120)
ALT FLD-CCNC: 16 U/L DA — SIGNIFICANT CHANGE UP (ref 10–60)
ANION GAP SERPL CALC-SCNC: -1 MMOL/L — LOW (ref 5–17)
APPEARANCE UR: CLEAR — SIGNIFICANT CHANGE UP
AST SERPL-CCNC: 12 U/L — SIGNIFICANT CHANGE UP (ref 10–40)
BASOPHILS # BLD AUTO: 0.04 K/UL — SIGNIFICANT CHANGE UP (ref 0–0.2)
BASOPHILS NFR BLD AUTO: 0.4 % — SIGNIFICANT CHANGE UP (ref 0–2)
BILIRUB SERPL-MCNC: 0.5 MG/DL — SIGNIFICANT CHANGE UP (ref 0.2–1.2)
BILIRUB UR-MCNC: NEGATIVE — SIGNIFICANT CHANGE UP
BUN SERPL-MCNC: 8 MG/DL — SIGNIFICANT CHANGE UP (ref 7–18)
CALCIUM SERPL-MCNC: 9.2 MG/DL — SIGNIFICANT CHANGE UP (ref 8.4–10.5)
CHLORIDE SERPL-SCNC: 110 MMOL/L — HIGH (ref 96–108)
CO2 SERPL-SCNC: 33 MMOL/L — HIGH (ref 22–31)
COLOR SPEC: YELLOW — SIGNIFICANT CHANGE UP
CREAT SERPL-MCNC: 0.64 MG/DL — SIGNIFICANT CHANGE UP (ref 0.5–1.3)
DIFF PNL FLD: NEGATIVE — SIGNIFICANT CHANGE UP
EGFR: 106 ML/MIN/1.73M2 — SIGNIFICANT CHANGE UP
EOSINOPHIL # BLD AUTO: 0.3 K/UL — SIGNIFICANT CHANGE UP (ref 0–0.5)
EOSINOPHIL NFR BLD AUTO: 2.6 % — SIGNIFICANT CHANGE UP (ref 0–6)
GLUCOSE SERPL-MCNC: 172 MG/DL — HIGH (ref 70–99)
GLUCOSE UR QL: 1000 MG/DL
HCG UR QL: NEGATIVE — SIGNIFICANT CHANGE UP
HCT VFR BLD CALC: 42.7 % — SIGNIFICANT CHANGE UP (ref 34.5–45)
HGB BLD-MCNC: 13.7 G/DL — SIGNIFICANT CHANGE UP (ref 11.5–15.5)
IMM GRANULOCYTES NFR BLD AUTO: 0.3 % — SIGNIFICANT CHANGE UP (ref 0–0.9)
KETONES UR-MCNC: NEGATIVE — SIGNIFICANT CHANGE UP
LEUKOCYTE ESTERASE UR-ACNC: NEGATIVE — SIGNIFICANT CHANGE UP
LIDOCAIN IGE QN: 95 U/L — SIGNIFICANT CHANGE UP (ref 73–393)
LYMPHOCYTES # BLD AUTO: 2.9 K/UL — SIGNIFICANT CHANGE UP (ref 1–3.3)
LYMPHOCYTES # BLD AUTO: 25.4 % — SIGNIFICANT CHANGE UP (ref 13–44)
MAGNESIUM SERPL-MCNC: 1.9 MG/DL — SIGNIFICANT CHANGE UP (ref 1.6–2.6)
MCHC RBC-ENTMCNC: 29.8 PG — SIGNIFICANT CHANGE UP (ref 27–34)
MCHC RBC-ENTMCNC: 32.1 GM/DL — SIGNIFICANT CHANGE UP (ref 32–36)
MCV RBC AUTO: 92.8 FL — SIGNIFICANT CHANGE UP (ref 80–100)
MONOCYTES # BLD AUTO: 0.7 K/UL — SIGNIFICANT CHANGE UP (ref 0–0.9)
MONOCYTES NFR BLD AUTO: 6.1 % — SIGNIFICANT CHANGE UP (ref 2–14)
NEUTROPHILS # BLD AUTO: 7.43 K/UL — HIGH (ref 1.8–7.4)
NEUTROPHILS NFR BLD AUTO: 65.2 % — SIGNIFICANT CHANGE UP (ref 43–77)
NITRITE UR-MCNC: NEGATIVE — SIGNIFICANT CHANGE UP
NRBC # BLD: 0 /100 WBCS — SIGNIFICANT CHANGE UP (ref 0–0)
PH UR: 8 — SIGNIFICANT CHANGE UP (ref 5–8)
PLATELET # BLD AUTO: 287 K/UL — SIGNIFICANT CHANGE UP (ref 150–400)
POTASSIUM SERPL-MCNC: 3.6 MMOL/L — SIGNIFICANT CHANGE UP (ref 3.5–5.3)
POTASSIUM SERPL-SCNC: 3.6 MMOL/L — SIGNIFICANT CHANGE UP (ref 3.5–5.3)
PROT SERPL-MCNC: 6.9 G/DL — SIGNIFICANT CHANGE UP (ref 6–8.3)
PROT UR-MCNC: NEGATIVE — SIGNIFICANT CHANGE UP
RBC # BLD: 4.6 M/UL — SIGNIFICANT CHANGE UP (ref 3.8–5.2)
RBC # FLD: 12.3 % — SIGNIFICANT CHANGE UP (ref 10.3–14.5)
SODIUM SERPL-SCNC: 142 MMOL/L — SIGNIFICANT CHANGE UP (ref 135–145)
SP GR SPEC: 1.01 — SIGNIFICANT CHANGE UP (ref 1.01–1.02)
UROBILINOGEN FLD QL: 1 MG/DL
WBC # BLD: 11.4 K/UL — HIGH (ref 3.8–10.5)
WBC # FLD AUTO: 11.4 K/UL — HIGH (ref 3.8–10.5)

## 2023-04-25 PROCEDURE — 83690 ASSAY OF LIPASE: CPT

## 2023-04-25 PROCEDURE — 96361 HYDRATE IV INFUSION ADD-ON: CPT

## 2023-04-25 PROCEDURE — 81025 URINE PREGNANCY TEST: CPT

## 2023-04-25 PROCEDURE — 74177 CT ABD & PELVIS W/CONTRAST: CPT | Mod: MA

## 2023-04-25 PROCEDURE — 36415 COLL VENOUS BLD VENIPUNCTURE: CPT

## 2023-04-25 PROCEDURE — 80053 COMPREHEN METABOLIC PANEL: CPT

## 2023-04-25 PROCEDURE — 87086 URINE CULTURE/COLONY COUNT: CPT

## 2023-04-25 PROCEDURE — 85025 COMPLETE CBC W/AUTO DIFF WBC: CPT

## 2023-04-25 PROCEDURE — 99284 EMERGENCY DEPT VISIT MOD MDM: CPT | Mod: 25

## 2023-04-25 PROCEDURE — 83735 ASSAY OF MAGNESIUM: CPT

## 2023-04-25 PROCEDURE — 99284 EMERGENCY DEPT VISIT MOD MDM: CPT

## 2023-04-25 PROCEDURE — 81003 URINALYSIS AUTO W/O SCOPE: CPT

## 2023-04-25 PROCEDURE — 96374 THER/PROPH/DIAG INJ IV PUSH: CPT | Mod: XU

## 2023-04-25 PROCEDURE — 74177 CT ABD & PELVIS W/CONTRAST: CPT | Mod: 26,MA

## 2023-04-25 RX ORDER — ONDANSETRON 8 MG/1
1 TABLET, FILM COATED ORAL
Qty: 10 | Refills: 0
Start: 2023-04-25 | End: 2023-04-27

## 2023-04-25 RX ORDER — ONDANSETRON 8 MG/1
4 TABLET, FILM COATED ORAL ONCE
Refills: 0 | Status: COMPLETED | OUTPATIENT
Start: 2023-04-25 | End: 2023-04-25

## 2023-04-25 RX ORDER — SODIUM CHLORIDE 9 MG/ML
1000 INJECTION INTRAMUSCULAR; INTRAVENOUS; SUBCUTANEOUS ONCE
Refills: 0 | Status: COMPLETED | OUTPATIENT
Start: 2023-04-25 | End: 2023-04-25

## 2023-04-25 RX ORDER — LOPERAMIDE HCL 2 MG
4 TABLET ORAL ONCE
Refills: 0 | Status: COMPLETED | OUTPATIENT
Start: 2023-04-25 | End: 2023-04-25

## 2023-04-25 RX ADMIN — Medication 4 MILLIGRAM(S): at 19:38

## 2023-04-25 RX ADMIN — SODIUM CHLORIDE 1000 MILLILITER(S): 9 INJECTION INTRAMUSCULAR; INTRAVENOUS; SUBCUTANEOUS at 17:18

## 2023-04-25 RX ADMIN — SODIUM CHLORIDE 1000 MILLILITER(S): 9 INJECTION INTRAMUSCULAR; INTRAVENOUS; SUBCUTANEOUS at 18:18

## 2023-04-25 RX ADMIN — ONDANSETRON 4 MILLIGRAM(S): 8 TABLET, FILM COATED ORAL at 17:17

## 2023-04-25 RX ADMIN — Medication 30 MILLILITER(S): at 17:17

## 2023-04-25 NOTE — ED PROVIDER NOTE - NS ED ATTENDING STATEMENT MOD
This was a shared visit with the ALFREDO. I reviewed and verified the documentation and independently performed the documented:

## 2023-04-25 NOTE — ED ADULT NURSE NOTE - IS THE PATIENT ABLE TO BE SCREENED?
Delivery Note  Information for the patient's :  Cheyanne Esqueda [[id number]]   Birth Information  YOB: 2021  Time of birth: 7:16 PM  Delivering clinician: Loida Ash  Sex: female  Delivery type: Vaginal, Spontaneous  Presentation: Vertex  Gestational Age: 39w0d    APGARS:    One Minute: 9   Five Minutes: 9    Ten Minutes:         Weight: 6 lb 5.2 oz (2870 g)   NICU Staff Present at Delivery?: No     Cervical Ripening: none  Pitocin: Used for induction, Used for augmentation and Used postpartum     Anesthesia: Epidural  EBL: 205 cc    Antibiotics Received During Labor: Ampicillin  Antibiotic Reason: Prophylaxis    Episiotomy: none  Lacerations: 2nd degree    Uterus Explored: No    Complications: None    Condition: Stable    Delivery:  The infant presented in OA position, restituted to ROT with left shoulder anterior. The infant was delivered and bulb suctioned.  Cord clamping was delayed for 30 seconds, then the cord was doubly clamped and cut.  The infant was brought to the warmer to the waiting nursing staff..  Placenta was delivered spontaneously and intact.  No cervical or vaginal lacerations noted.  The 2nd degree perineal laceration was repaired with 2-0 Vicryl in layers.  Mom and baby stable.     Attending: Dr. Jaime   Resident: Bel Loja MD, PGY-1  Review the Delivery Report for details            No

## 2023-04-25 NOTE — ED PROVIDER NOTE - OBJECTIVE STATEMENT
52-year-old female, history of HLD, diabetes, presents for evaluation of abdominal pain and diarrhea x1 week.  Gradual onset of localized intermittent left lower quadrant pain.  Initially had about 2–3 episodes of nonbloody diarrhea per day but since last night had diarrhea all night long.  Denies any melena or BRBPR.  Also reports having nausea and had 2 episodes of NBNB vomiting since the pain started.  Also reports feeling bloated and a lot of gas in the abdomen.  Denies any recent illness, fever, night sweats, urinary symptoms or any other complaints.  No history of diverticulitis or colitis.  No other family members with similar symptoms. Last colonoscopy ~ 6 years ago.

## 2023-04-25 NOTE — ED PROVIDER NOTE - PATIENT PORTAL LINK FT
You can access the FollowMyHealth Patient Portal offered by [facility] by registering at the following website: [email or web address]. By joining [facility] [employer]’s FollowMyHealth portal, you will also be able to view your health information using other applications (apps) compatible with our system.

## 2023-04-25 NOTE — ED PROVIDER NOTE - CLINICAL SUMMARY MEDICAL DECISION MAKING FREE TEXT BOX
52-year-old female, presents with left lower quadrant pain x1 week associated with nonbloody diarrhea.  Patient is well-appearing, no systemic symptoms, vital signs stable, afebrile.  Abdomen is soft, nondistended and nontender.  Labs grossly unremarkable.  CT shows no signs of colitis or diverticulitis.  Also no abscess or free air. Low clinical suspicion for ovarian torsion. History and exam consistent with enteritis.  No indication for antibiotic at this time.  Will discuss home care and PMD follow-up.

## 2023-04-27 LAB
CULTURE RESULTS: NO GROWTH — SIGNIFICANT CHANGE UP
SPECIMEN SOURCE: SIGNIFICANT CHANGE UP

## 2023-06-09 NOTE — ED PROVIDER NOTE - SKIN, MLM
Hemigard Postcare Instructions: The HEMIGARD strips are to remain completely dry for at least 5-7 days. Skin normal color for race, warm, dry and intact. No evidence of rash.

## 2023-07-11 NOTE — ED ADULT TRIAGE NOTE - TEMPERATURE IN CELSIUS (DEGREES C)
From: Tosin Molina  To: Tommy Bella. Lew Rawls MD  Sent: 7/11/2023 8:32 AM CDT  Subject: prescription     I got a message that my metroprolol has been denied. I am not ready for refill since the prescription is for 90 days. I have not called for refill.     Dahlia Seth 37.1

## 2023-09-12 ENCOUNTER — NON-APPOINTMENT (OUTPATIENT)
Age: 53
End: 2023-09-12

## 2024-01-23 NOTE — ED PROVIDER NOTE - CROS ED NEURO POS
Spoke to patients   regarding scheduling an appointment with Dr. Gallardo. Patients  does not want to schedule any appointments at this time. They will request the PCP refill all her medications.    
HEADACHE/LOC, L-sided facial numbness, head trauma

## 2024-01-25 NOTE — ED ADULT NURSE NOTE - CAS EDN DISCHARGE INTERVENTIONS
Gen: WDWN, holding right side of head  HEENT: PERRLA, EOMI, no nasal discharge, mucous membranes moist  CV: RRR, +S1/S2, no M/R/G, 2+ radial pulses b/l  Resp: CTAB, no W/R/R, no accessory muscle use, no increased work of breathing  GI: RLQ tenderness to palpation without rebound or guarding.   MSK: No open wounds, no bruising, no LE edema  Neuro: CN II-XII grossly intact. finger to nose intact. no pronator drift. following commands, moving all four extremities spontaneously  Psych: appropriate mood IV discontinued, cath removed intact

## 2024-02-09 NOTE — PATIENT PROFILE ADULT - HARM RISK FACTORS
----- Message from REGLA Boland sent at 2/6/2024 11:43 AM CST -----  Call patient, If no improvement in sinus congestion, please ssend out cefdinir 300 BID x 10 days     yes

## 2024-02-27 NOTE — ED ADULT NURSE NOTE - CAS ELECT INFOMATION PROVIDED
Follow up with your primary care physician to schedule an appointment for further evaluation and management. Return to the ED for any new or concerning symptoms including but not limited to severe worsening of shortness of breath, chest pain with associated shortness of breath, if you become short of breath after walking up the stairs of accross a room requiring a rest to recover, chest pain that worsens with exertion and improves wtih rest which may require further wrokup and management.     Optimal treatment for this is similar to other viral illnesses including tylenol/motrin for fever and body aches, promoting hydration with water/gatorade/pedialyte, and plenty of rest. If your symptoms become severe and you have symptoms as described above, low oxygen levels requiring oxygen supplementation would be a reason to keep you in the hospital for treatment. You can return to the ED at any time if your symptoms worsen for further evaluation. If your oxygen level is not low and your initial workup is normal, most cases can be managed at home.    Patient seen and discharged in intake by Devante NP/DC instructions

## 2024-04-04 ENCOUNTER — EMERGENCY (EMERGENCY)
Facility: HOSPITAL | Age: 54
LOS: 1 days | Discharge: HOME | End: 2024-04-04
Attending: EMERGENCY MEDICINE
Payer: MEDICARE

## 2024-04-04 VITALS
RESPIRATION RATE: 18 BRPM | HEIGHT: 63 IN | HEART RATE: 79 BPM | DIASTOLIC BLOOD PRESSURE: 78 MMHG | SYSTOLIC BLOOD PRESSURE: 120 MMHG | WEIGHT: 157.19 LBS | TEMPERATURE: 98 F | OXYGEN SATURATION: 97 %

## 2024-04-04 DIAGNOSIS — Z98.89 OTHER SPECIFIED POSTPROCEDURAL STATES: Chronic | ICD-10-CM

## 2024-04-04 DIAGNOSIS — N60.02 SOLITARY CYST OF LEFT BREAST: Chronic | ICD-10-CM

## 2024-04-04 DIAGNOSIS — Z98.891 HISTORY OF UTERINE SCAR FROM PREVIOUS SURGERY: Chronic | ICD-10-CM

## 2024-04-04 DIAGNOSIS — Z98.890 OTHER SPECIFIED POSTPROCEDURAL STATES: Chronic | ICD-10-CM

## 2024-04-04 DIAGNOSIS — M25.571 PAIN IN RIGHT ANKLE AND JOINTS OF RIGHT FOOT: Chronic | ICD-10-CM

## 2024-04-04 DIAGNOSIS — Z98.51 TUBAL LIGATION STATUS: Chronic | ICD-10-CM

## 2024-04-04 PROCEDURE — 73030 X-RAY EXAM OF SHOULDER: CPT | Mod: 26,LT

## 2024-04-04 PROCEDURE — 96372 THER/PROPH/DIAG INJ SC/IM: CPT

## 2024-04-04 PROCEDURE — 73030 X-RAY EXAM OF SHOULDER: CPT

## 2024-04-04 PROCEDURE — 99284 EMERGENCY DEPT VISIT MOD MDM: CPT

## 2024-04-04 PROCEDURE — 99283 EMERGENCY DEPT VISIT LOW MDM: CPT | Mod: 25

## 2024-04-04 RX ORDER — DICLOFENAC SODIUM 75 MG/1
1 TABLET, DELAYED RELEASE ORAL
Qty: 20 | Refills: 0
Start: 2024-04-04 | End: 2024-04-13

## 2024-04-04 RX ORDER — KETOROLAC TROMETHAMINE 30 MG/ML
30 SYRINGE (ML) INJECTION ONCE
Refills: 0 | Status: DISCONTINUED | OUTPATIENT
Start: 2024-04-04 | End: 2024-04-04

## 2024-04-04 RX ORDER — DIAZEPAM 5 MG
2 TABLET ORAL ONCE
Refills: 0 | Status: DISCONTINUED | OUTPATIENT
Start: 2024-04-04 | End: 2024-04-04

## 2024-04-04 RX ADMIN — Medication 2 MILLIGRAM(S): at 17:41

## 2024-04-04 RX ADMIN — Medication 30 MILLIGRAM(S): at 17:41

## 2024-04-04 NOTE — ED ADULT NURSE NOTE - OBJECTIVE STATEMENT
As per pt. c/o left shoulder pain x 3 months with no noticeable cause. pt. denies falling, injuring the shoulder or  any indirect traumatic events. Denies chest pain, palpitations and all other symptoms.

## 2024-04-04 NOTE — ED PROVIDER NOTE - PATIENT PORTAL LINK FT
You can access the FollowMyHealth Patient Portal offered by [facility] by registering at the following website: [email or web address]. By joining [email or web address]’s FollowMyHealth portal, you will also be able to view your health information using other applications (apps) compatible with our system.

## 2024-04-04 NOTE — ED PROVIDER NOTE - CHPI ED SYMPTOMS POS
ARTHRALGIAS/JOINT SWELLING/PAIN/STIFFNESS/TENDERNESS [5___] : hamstring 5[unfilled]/5 [Left] : left knee [Degenerative change] : Degenerative change [Negative] : negative Garry's [All Views] : anteroposterior, lateral, skyline, and anteroposterior standing [] : non-antalgic [[id number]] : flexion 120 degrees [de-identified] : extension 0 degrees

## 2024-04-04 NOTE — ED ADULT NURSE NOTE - NSFALLUNIVINTERV_ED_ALL_ED
Bed/Stretcher in lowest position, wheels locked, appropriate side rails in place/Call bell, personal items and telephone in reach/Instruct patient to call for assistance before getting out of bed/chair/stretcher/Non-slip footwear applied when patient is off stretcher/[place] to call system/Physically safe environment - no spills, clutter or unnecessary equipment/Purposeful proactive rounding/Room/bathroom lighting operational, light cord in reach

## 2024-04-04 NOTE — ED PROVIDER NOTE - OBJECTIVE STATEMENT
pt co of persistant pain to left shoulder x 3months.under care of orthopedic/pt no relief.due for injection

## 2024-04-04 NOTE — ED ADULT NURSE NOTE - RADIATION
VERO MILLER  MRN-[id number]  Patient is a 76y old  Male who presents with a chief complaint of CABG (07 Jan 2022 07:34)    HPI:  76 yr old male with PMH of HTN, HLD, Type 2 DM, CAD, former smoker presented to Dr. Augusto Cordero for follow up and underwent stress test which showed inferior, inferolateral, and septal ischemia, EF 60%. Patient presents today for further ischemic workup. Denies any chest pain or shortness of breath today.  Of note, patient recently lost wife to pancreatic cancer and son to COVID.  (05 Jan 2022 09:28)      Surgery/Hospital course:  1/6 R Fem pre-op IABP, CABG     Today:    REVIEW OF SYSTEMS:  Gen: No fever  EYES/ENT: No visual changes;  No vertigo or throat pain   NECK: No pain   RES:  No shortness of breath or Cough  CARD: No chest pain   GI: No abdominal pain  : No dysuria  NEURO: No weakness  SKIN: No itching, rashes     Physical Exam:  Vital Signs Last 24 Hrs  T(C): 37.3 (08 Jan 2022 04:00), Max: 37.7 (08 Jan 2022 00:00)  T(F): 99.1 (08 Jan 2022 04:00), Max: 99.9 (08 Jan 2022 00:00)  HR: 94 (08 Jan 2022 07:00) (82 - 109)  BP: 93/55 (08 Jan 2022 07:00) (85/52 - 121/61)  BP(mean): 70 (08 Jan 2022 07:00) (65 - 85)  RR: 25 (08 Jan 2022 07:00) (13 - 44)  SpO2: 96% (08 Jan 2022 07:00) (94% - 100%)  Gen:  Awake, alert   CNS: non focal 	  Neck: no JVD  RES : clear , no wheezing                       CVS: Regular  rhythm. Normal S1/S2  Abd: Soft, non-distended. Bowel sounds present.  Skin: No rash.  Ext:  no edema, A Line  ============================I/O===========================   I&O's Detail    07 Jan 2022 07:01  -  08 Jan 2022 07:00  --------------------------------------------------------  IN:    Albumin 5%  - 250 mL: 500 mL    Insulin: 66 mL    Insulin: 6 mL    IV PiggyBack: 150 mL    Norepinephrine: 39.7 mL    Oral Fluid: 600 mL    PRBCs (Packed Red Blood Cells): 300 mL    sodium chloride 0.9%: 240 mL    Vasopressin: 3 mL  Total IN: 1904.7 mL    OUT:    Chest Tube (mL): 340 mL    Chest Tube (mL): 330 mL    Indwelling Catheter - Urethral (mL): 1210 mL    Voided (mL): 300 mL  Total OUT: 2180 mL    Total NET: -275.3 mL        ============================ LABS =========================                        7.8    7.30  )-----------( 167      ( 08 Jan 2022 00:34 )             23.7     01-08    138  |  104  |  29<H>  ----------------------------<  124<H>  4.0   |  21<L>  |  1.22    Ca    8.5      08 Jan 2022 00:32  Phos  2.6     01-08  Mg     2.5     01-08    TPro  5.6<L>  /  Alb  3.6  /  TBili  0.7  /  DBili  x   /  AST  23  /  ALT  23  /  AlkPhos  37<L>  01-08    LIVER FUNCTIONS - ( 08 Jan 2022 00:32 )  Alb: 3.6 g/dL / Pro: 5.6 g/dL / ALK PHOS: 37 U/L / ALT: 23 U/L / AST: 23 U/L / GGT: x           PT/INR - ( 07 Jan 2022 02:03 )   PT: 13.3 sec;   INR: 1.11 ratio         PTT - ( 07 Jan 2022 02:03 )  PTT:26.2 sec  ABG - ( 08 Jan 2022 03:40 )  pH, Arterial: 7.42  pH, Blood: x     /  pCO2: 36    /  pO2: 80    / HCO3: 23    / Base Excess: -0.9  /  SaO2: 94.3                ======================Micro/Rad/Cardio=================  CXR: Reviewed  Echo:Reviewed  ======================================================  PAST MEDICAL & SURGICAL HISTORY:  HTN (hypertension)    Diabetes    Hyperlipidemia    CAD (coronary artery disease)    History of tonsillectomy      ====================ASSESSMENT ==============  CAD s/p CABG 1/6   Former smoker  Hypertension   Diabetes mellitus type 2  Hyperlipidemia       Plan:  ====================== NEUROLOGY=====================  Continue close monitoring of neuro status   Tylenol, Gabapentin, Hydromorphone, and Oxycodone PRN for analgesia     acetaminophen     Tablet .. 650 milliGRAM(s) Oral every 6 hours  gabapentin 100 milliGRAM(s) Oral every 8 hours  HYDROmorphone  Injectable 0.5 milliGRAM(s) IV Push every 6 hours PRN Severe Pain (7 - 10)  oxyCODONE    IR 5 milliGRAM(s) Oral every 4 hours PRN Moderate Pain (4 - 6)  oxyCODONE    IR 10 milliGRAM(s) Oral every 4 hours PRN Severe Pain (7 - 10)    ==================== RESPIRATORY======================  Stable on RA, SpO2 94% - 100%  Encourage incentive spirometry, continue pulse ox monitoring, follow ABGs     ====================CARDIOVASCULAR==================  CAD s/p CABG 1/6, IABP placement 1/6  with good augmentation 1:1   Continue invasive hemodynamic monitoring   Lactate 0.8, continue trending   Lopressor for rate control   ASA/Lipitor for CAD     metoprolol tartrate 25 milliGRAM(s) Oral every 8 hours  atorvastatin 80 milliGRAM(s) Oral at bedtime  aspirin enteric coated 325 milliGRAM(s) Oral daily    ===================HEMATOLOGIC/ONC ===================  Continue to monitor H&H/Plts     VTE prophylaxis, enoxaparin Injectable 40 milliGRAM(s) SubCutaneous daily    ===================== RENAL =========================  Merrill for monitoring urine output  Continue monitoring I&Os and BUN/Cr  Replete lytes PRN. Keep K> 4 and Mg >2    ==================== GASTROINTESTINAL===================  Tolerating regular PO diet   Bowel regimen with Miralax and Senna     ascorbic acid 500 milliGRAM(s) Oral two times a day  bisacodyl Suppository 10 milliGRAM(s) Rectal once  dextrose 5%. 1000 milliLiter(s) (50 mL/Hr) IV Continuous <Continuous>  multivitamin 1 Tablet(s) Oral daily  GI prophylaxis, pantoprazole    Tablet 40 milliGRAM(s) Oral before breakfast  polyethylene glycol 3350 17 Gram(s) Oral daily  senna 2 Tablet(s) Oral at bedtime  sodium chloride 0.9%. 1000 milliLiter(s) (10 mL/Hr) IV Continuous <Continuous>    =======================    ENDOCRINE  =====================  DM2 continue glycemic control with insulin gtt     dextrose 40% Gel 15 Gram(s) Oral once  dextrose 50% Injectable 50 milliLiter(s) IV Push every 15 minutes  glucagon  Injectable 1 milliGRAM(s) IntraMuscular once  insulin regular Infusion 3 Unit(s)/Hr (3 mL/Hr) IV Continuous <Continuous>    ========================INFECTIOUS DISEASE================  Temp 99.1F, WBC within normal limits  Continue trending WBC and monitoring fever curve         By signing my name below, I, Maggie Fontana, attest that this documentation has been prepared under the direction and in the presence of Madie Arana MD.  Electronically signed: Quentin Valentine, 01-08-22 @ 08:52    I, Madie Arana, personally performed the services described in this documentation. all medical record entries made by the scribe were at my direction and in my presence. I have reviewed the chart and agree that the record reflects my personal performance and is accurate and complete  Electronically signed: Madie Arana, 01-08-22 @ 08:52       VERO MILLER  MRN-[id number]  Patient is a 76y old  Male who presents with a chief complaint of CABG (07 Jan 2022 07:34)    HPI:  76 yr old male with PMH of HTN, HLD, Type 2 DM, CAD, former smoker presented to Dr. Augusto Cordero for follow up and underwent stress test which showed inferior, inferolateral, and septal ischemia, EF 60%. Patient presents today for further ischemic workup. Denies any chest pain or shortness of breath today.  Of note, patient recently lost wife to pancreatic cancer and son to COVID.  (05 Jan 2022 09:28)      Surgery/Hospital course:  1/6 R Fem pre-op IABP, CABG     Today:  - patient is OOBTC   - recieved 1U PRBC overnight   - repeat CBC     REVIEW OF SYSTEMS:  Gen: No fever  EYES/ENT: No visual changes;  No vertigo or throat pain   NECK: No pain   RES:  No shortness of breath or Cough  CARD: No chest pain   GI: No abdominal pain  : No dysuria  NEURO: No weakness  SKIN: No itching, rashes     Physical Exam:  Vital Signs Last 24 Hrs  T(C): 37.3 (08 Jan 2022 04:00), Max: 37.7 (08 Jan 2022 00:00)  T(F): 99.1 (08 Jan 2022 04:00), Max: 99.9 (08 Jan 2022 00:00)  HR: 94 (08 Jan 2022 07:00) (82 - 109)  BP: 93/55 (08 Jan 2022 07:00) (85/52 - 121/61)  BP(mean): 70 (08 Jan 2022 07:00) (65 - 85)  RR: 25 (08 Jan 2022 07:00) (13 - 44)  SpO2: 96% (08 Jan 2022 07:00) (94% - 100%)  Gen:  Awake, alert   CNS: non focal 	  Neck: no JVD  RES : clear , no wheezing                       CVS: Regular  rhythm. Normal S1/S2  Abd: Soft, non-distended. Bowel sounds present.  Skin: No rash.  Ext:  no edema, A Line  ============================I/O===========================   I&O's Detail    07 Jan 2022 07:01  -  08 Jan 2022 07:00  --------------------------------------------------------  IN:    Albumin 5%  - 250 mL: 500 mL    Insulin: 66 mL    Insulin: 6 mL    IV PiggyBack: 150 mL    Norepinephrine: 39.7 mL    Oral Fluid: 600 mL    PRBCs (Packed Red Blood Cells): 300 mL    sodium chloride 0.9%: 240 mL    Vasopressin: 3 mL  Total IN: 1904.7 mL    OUT:    Chest Tube (mL): 340 mL    Chest Tube (mL): 330 mL    Indwelling Catheter - Urethral (mL): 1210 mL    Voided (mL): 300 mL  Total OUT: 2180 mL    Total NET: -275.3 mL        ============================ LABS =========================                        7.8    7.30  )-----------( 167      ( 08 Jan 2022 00:34 )             23.7     01-08    138  |  104  |  29<H>  ----------------------------<  124<H>  4.0   |  21<L>  |  1.22    Ca    8.5      08 Jan 2022 00:32  Phos  2.6     01-08  Mg     2.5     01-08    TPro  5.6<L>  /  Alb  3.6  /  TBili  0.7  /  DBili  x   /  AST  23  /  ALT  23  /  AlkPhos  37<L>  01-08    LIVER FUNCTIONS - ( 08 Jan 2022 00:32 )  Alb: 3.6 g/dL / Pro: 5.6 g/dL / ALK PHOS: 37 U/L / ALT: 23 U/L / AST: 23 U/L / GGT: x           PT/INR - ( 07 Jan 2022 02:03 )   PT: 13.3 sec;   INR: 1.11 ratio         PTT - ( 07 Jan 2022 02:03 )  PTT:26.2 sec  ABG - ( 08 Jan 2022 03:40 )  pH, Arterial: 7.42  pH, Blood: x     /  pCO2: 36    /  pO2: 80    / HCO3: 23    / Base Excess: -0.9  /  SaO2: 94.3                ======================Micro/Rad/Cardio=================  CXR: Reviewed  Echo:Reviewed  ======================================================  PAST MEDICAL & SURGICAL HISTORY:  HTN (hypertension)    Diabetes    Hyperlipidemia    CAD (coronary artery disease)    History of tonsillectomy      ====================ASSESSMENT ==============  CAD s/p CABG 1/6   Former smoker  Hypertension   Diabetes mellitus type 2  Hyperlipidemia       Plan:  ====================== NEUROLOGY=====================  Continue close monitoring of neuro status   Tylenol, Gabapentin, Hydromorphone, and Oxycodone PRN for analgesia   OOBTC     acetaminophen     Tablet .. 650 milliGRAM(s) Oral every 6 hours  gabapentin 100 milliGRAM(s) Oral every 8 hours  HYDROmorphone  Injectable 0.5 milliGRAM(s) IV Push every 6 hours PRN Severe Pain (7 - 10)  oxyCODONE    IR 5 milliGRAM(s) Oral every 4 hours PRN Moderate Pain (4 - 6)  oxyCODONE    IR 10 milliGRAM(s) Oral every 4 hours PRN Severe Pain (7 - 10)    ==================== RESPIRATORY======================  Stable on RA, SpO2 94% - 100%  Encourage incentive spirometry, continue pulse ox monitoring, follow ABGs     ====================CARDIOVASCULAR==================  CAD s/p CABG 1/6, IABP placement 1/6  with good augmentation 1:1   Continue invasive hemodynamic monitoring   Lactate 0.8, continue trending   Lopressor for rate control   ASA/Lipitor for CAD     metoprolol tartrate 25 milliGRAM(s) Oral every 8 hours  atorvastatin 80 milliGRAM(s) Oral at bedtime  aspirin enteric coated 325 milliGRAM(s) Oral daily    ===================HEMATOLOGIC/ONC ===================  Continue to monitor H&H/Plts     VTE prophylaxis, enoxaparin Injectable 40 milliGRAM(s) SubCutaneous daily    ===================== RENAL =========================  Merrill for monitoring urine output  Continue monitoring I&Os and BUN/Cr  Replete lytes PRN. Keep K> 4 and Mg >2    ==================== GASTROINTESTINAL===================  Tolerating regular PO diet   Bowel regimen with Miralax and Senna     ascorbic acid 500 milliGRAM(s) Oral two times a day  bisacodyl Suppository 10 milliGRAM(s) Rectal once  dextrose 5%. 1000 milliLiter(s) (50 mL/Hr) IV Continuous <Continuous>  multivitamin 1 Tablet(s) Oral daily  GI prophylaxis, pantoprazole    Tablet 40 milliGRAM(s) Oral before breakfast  polyethylene glycol 3350 17 Gram(s) Oral daily  senna 2 Tablet(s) Oral at bedtime  sodium chloride 0.9%. 1000 milliLiter(s) (10 mL/Hr) IV Continuous <Continuous>    =======================    ENDOCRINE  =====================  DM2 continue glycemic control with insulin gtt     dextrose 40% Gel 15 Gram(s) Oral once  dextrose 50% Injectable 50 milliLiter(s) IV Push every 15 minutes  glucagon  Injectable 1 milliGRAM(s) IntraMuscular once  insulin regular Infusion 3 Unit(s)/Hr (3 mL/Hr) IV Continuous <Continuous>    ========================INFECTIOUS DISEASE================  Temp 99.1F, WBC within normal limits  Continue trending WBC and monitoring fever curve         By signing my name below, I, Maggie Fontana, attest that this documentation has been prepared under the direction and in the presence of Madie Arana MD.  Electronically signed: Quentin Valentine, 01-08-22 @ 08:52    I, Madie Arana, personally performed the services described in this documentation. all medical record entries made by the scribe were at my direction and in my presence. I have reviewed the chart and agree that the record reflects my personal performance and is accurate and complete  Electronically signed: Madie Arana, 01-08-22 @ 08:52       VERO MILLER  MRN-[id number]  Patient is a 76y old  Male who presents with a chief complaint of CABG (07 Jan 2022 07:34)    HPI:  76 yr old male with PMH of HTN, HLD, Type 2 DM, CAD, former smoker presented to Dr. Augusto Cordero for follow up and underwent stress test which showed inferior, inferolateral, and septal ischemia, EF 60%. Patient presents today for further ischemic workup. Denies any chest pain or shortness of breath today.  Of note, patient recently lost wife to pancreatic cancer and son to COVID.  (05 Jan 2022 09:28)      Surgery/Hospital course:  1/6 R Fem pre-op IABP, CABG     Today:  - patient is OOBTC   - recieved 1U PRBC overnight   - repeat CBC     REVIEW OF SYSTEMS:  Gen: No fever  EYES/ENT: No visual changes;  No vertigo or throat pain   NECK: No pain   RES:  No shortness of breath or Cough  CARD: No chest pain   GI: No abdominal pain  : No dysuria  NEURO: No weakness  SKIN: No itching, rashes     Physical Exam:  Vital Signs Last 24 Hrs  T(C): 37.3 (08 Jan 2022 04:00), Max: 37.7 (08 Jan 2022 00:00)  T(F): 99.1 (08 Jan 2022 04:00), Max: 99.9 (08 Jan 2022 00:00)  HR: 94 (08 Jan 2022 07:00) (82 - 109)  BP: 93/55 (08 Jan 2022 07:00) (85/52 - 121/61)  BP(mean): 70 (08 Jan 2022 07:00) (65 - 85)  RR: 25 (08 Jan 2022 07:00) (13 - 44)  SpO2: 96% (08 Jan 2022 07:00) (94% - 100%)  Gen:  Awake, alert   CNS: non focal 	  Neck: no JVD  RES : clear , no wheezing                       CVS: Regular  rhythm. Normal S1/S2  Abd: Soft, non-distended. Bowel sounds present.  Skin: No rash.  Ext:  no edema, A Line  ============================I/O===========================   I&O's Detail    07 Jan 2022 07:01  -  08 Jan 2022 07:00  --------------------------------------------------------  IN:    Albumin 5%  - 250 mL: 500 mL    Insulin: 66 mL    Insulin: 6 mL    IV PiggyBack: 150 mL    Norepinephrine: 39.7 mL    Oral Fluid: 600 mL    PRBCs (Packed Red Blood Cells): 300 mL    sodium chloride 0.9%: 240 mL    Vasopressin: 3 mL  Total IN: 1904.7 mL    OUT:    Chest Tube (mL): 340 mL    Chest Tube (mL): 330 mL    Indwelling Catheter - Urethral (mL): 1210 mL    Voided (mL): 300 mL  Total OUT: 2180 mL    Total NET: -275.3 mL        ============================ LABS =========================                        7.8    7.30  )-----------( 167      ( 08 Jan 2022 00:34 )             23.7     01-08    138  |  104  |  29<H>  ----------------------------<  124<H>  4.0   |  21<L>  |  1.22    Ca    8.5      08 Jan 2022 00:32  Phos  2.6     01-08  Mg     2.5     01-08    TPro  5.6<L>  /  Alb  3.6  /  TBili  0.7  /  DBili  x   /  AST  23  /  ALT  23  /  AlkPhos  37<L>  01-08    LIVER FUNCTIONS - ( 08 Jan 2022 00:32 )  Alb: 3.6 g/dL / Pro: 5.6 g/dL / ALK PHOS: 37 U/L / ALT: 23 U/L / AST: 23 U/L / GGT: x           PT/INR - ( 07 Jan 2022 02:03 )   PT: 13.3 sec;   INR: 1.11 ratio         PTT - ( 07 Jan 2022 02:03 )  PTT:26.2 sec  ABG - ( 08 Jan 2022 03:40 )  pH, Arterial: 7.42  pH, Blood: x     /  pCO2: 36    /  pO2: 80    / HCO3: 23    / Base Excess: -0.9  /  SaO2: 94.3                ======================Micro/Rad/Cardio=================  CXR: Reviewed  Echo:Reviewed  ======================================================  PAST MEDICAL & SURGICAL HISTORY:  HTN (hypertension)    Diabetes    Hyperlipidemia    CAD (coronary artery disease)    History of tonsillectomy      ====================ASSESSMENT ==============  CAD s/p CABG 1/6   Former smoker  Hypertension   Diabetes mellitus type 2  Hyperlipidemia       Plan:  ====================== NEUROLOGY=====================  Continue close monitoring of neuro status   Tylenol, Gabapentin, Hydromorphone, and Oxycodone PRN for analgesia   OOBTC     acetaminophen     Tablet .. 650 milliGRAM(s) Oral every 6 hours  gabapentin 100 milliGRAM(s) Oral every 8 hours  HYDROmorphone  Injectable 0.5 milliGRAM(s) IV Push every 6 hours PRN Severe Pain (7 - 10)  oxyCODONE    IR 5 milliGRAM(s) Oral every 4 hours PRN Moderate Pain (4 - 6)  oxyCODONE    IR 10 milliGRAM(s) Oral every 4 hours PRN Severe Pain (7 - 10)    ==================== RESPIRATORY======================  Stable on RA, SpO2 94% - 100%  Encourage incentive spirometry, continue pulse ox monitoring, follow ABGs     ====================CARDIOVASCULAR==================  CAD s/p CABG 1/6, IABP placement 1/6  with good augmentation 1:1   Continue invasive hemodynamic monitoring   Lactate 0.8, continue trending   Lopressor for rate control   ASA/Lipitor for CAD     metoprolol tartrate 25 milliGRAM(s) Oral every 8 hours  atorvastatin 80 milliGRAM(s) Oral at bedtime  aspirin enteric coated 325 milliGRAM(s) Oral daily    ===================HEMATOLOGIC/ONC ===================  Continue to monitor H&H/Plts     VTE prophylaxis, enoxaparin Injectable 40 milliGRAM(s) SubCutaneous daily    ===================== RENAL =========================  Merrill for monitoring urine output  Continue monitoring I&Os and BUN/Cr  Replete lytes PRN. Keep K> 4 and Mg >2    ==================== GASTROINTESTINAL===================  Tolerating regular PO diet   Bowel regimen with Miralax and Senna     ascorbic acid 500 milliGRAM(s) Oral two times a day  bisacodyl Suppository 10 milliGRAM(s) Rectal once  dextrose 5%. 1000 milliLiter(s) (50 mL/Hr) IV Continuous <Continuous>  multivitamin 1 Tablet(s) Oral daily  GI prophylaxis, pantoprazole    Tablet 40 milliGRAM(s) Oral before breakfast  polyethylene glycol 3350 17 Gram(s) Oral daily  senna 2 Tablet(s) Oral at bedtime  sodium chloride 0.9%. 1000 milliLiter(s) (10 mL/Hr) IV Continuous <Continuous>    =======================    ENDOCRINE  =====================  DM2 continue glycemic control with insulin gtt     dextrose 40% Gel 15 Gram(s) Oral once  dextrose 50% Injectable 50 milliLiter(s) IV Push every 15 minutes  glucagon  Injectable 1 milliGRAM(s) IntraMuscular once  insulin regular Infusion 3 Unit(s)/Hr (3 mL/Hr) IV Continuous <Continuous>    ========================INFECTIOUS DISEASE================  Temp 99.1F, WBC within normal limits  Continue trending WBC and monitoring fever curve         By signing my name below, I, Maggie Fontana, attest that this documentation has been prepared under the direction and in the presence of Madie Arana MD.  Electronically signed: Quentin Valentine, 01-08-22 @ 08:52    I, Madie Arana, personally performed the services described in this documentation. all medical record entries made by the scribe were at my direction and in my presence. I have reviewed the chart and agree that the record reflects my personal performance and is accurate and complete  Electronically signed: Madie Arana, 01-08-22 @ 08:52       VERO MILLER  MRN-[id number]  Patient is a 76y old  Male who presents with a chief complaint of CABG (07 Jan 2022 07:34)    HPI:  76 yr old male with PMH of HTN, HLD, Type 2 DM, CAD, former smoker presented to Dr. Augusto Cordero for follow up and underwent stress test which showed inferior, inferolateral, and septal ischemia, EF 60%. Patient presents today for further ischemic workup. Denies any chest pain or shortness of breath today.  Of note, patient recently lost wife to pancreatic cancer and son to COVID.  (05 Jan 2022 09:28)      Surgery/Hospital course:  1/6 R Fem pre-op IABP, CABG     Today:   new onset atrial fibrillation, patient receiving amiodarone loading dose  Heart rate control with escalating dose of Lopressor, monitor blood pressure which is borderline  Glycemic control for hyperglycemia with insulin drip and started on Lantus tonight  - recieved 1U PRBC overnight   - repeat CBC     REVIEW OF SYSTEMS:  Gen: No fever  EYES/ENT: No visual changes;  No vertigo or throat pain   NECK: No pain   RES:  No shortness of breath or Cough  CARD: No chest pain   GI: No abdominal pain  : No dysuria  NEURO: No weakness  SKIN: No itching, rashes     Physical Exam:  Vital Signs Last 24 Hrs  T(C): 37.3 (08 Jan 2022 04:00), Max: 37.7 (08 Jan 2022 00:00)  T(F): 99.1 (08 Jan 2022 04:00), Max: 99.9 (08 Jan 2022 00:00)  HR: 94 (08 Jan 2022 07:00) (82 - 109)  BP: 93/55 (08 Jan 2022 07:00) (85/52 - 121/61)  BP(mean): 70 (08 Jan 2022 07:00) (65 - 85)  RR: 25 (08 Jan 2022 07:00) (13 - 44)  SpO2: 96% (08 Jan 2022 07:00) (94% - 100%)  Gen:  Awake, alert   CNS: non focal 	  Neck: no JVD  RES : clear , no wheezing                       CVS:irRegular  rhythm. Normal S1/S2  Abd: Soft, non-distended. Bowel sounds present.  Skin: No rash.  Ext:  no edema, A Line  ============================I/O===========================   I&O's Detail    07 Jan 2022 07:01  -  08 Jan 2022 07:00  --------------------------------------------------------  IN:    Albumin 5%  - 250 mL: 500 mL    Insulin: 66 mL    Insulin: 6 mL    IV PiggyBack: 150 mL    Norepinephrine: 39.7 mL    Oral Fluid: 600 mL    PRBCs (Packed Red Blood Cells): 300 mL    sodium chloride 0.9%: 240 mL    Vasopressin: 3 mL  Total IN: 1904.7 mL    OUT:    Chest Tube (mL): 340 mL    Chest Tube (mL): 330 mL    Indwelling Catheter - Urethral (mL): 1210 mL    Voided (mL): 300 mL  Total OUT: 2180 mL    Total NET: -275.3 mL        ============================ LABS =========================                        7.8    7.30  )-----------( 167      ( 08 Jan 2022 00:34 )             23.7     01-08    138  |  104  |  29<H>  ----------------------------<  124<H>  4.0   |  21<L>  |  1.22    Ca    8.5      08 Jan 2022 00:32  Phos  2.6     01-08  Mg     2.5     01-08    TPro  5.6<L>  /  Alb  3.6  /  TBili  0.7  /  DBili  x   /  AST  23  /  ALT  23  /  AlkPhos  37<L>  01-08    LIVER FUNCTIONS - ( 08 Jan 2022 00:32 )  Alb: 3.6 g/dL / Pro: 5.6 g/dL / ALK PHOS: 37 U/L / ALT: 23 U/L / AST: 23 U/L / GGT: x           PT/INR - ( 07 Jan 2022 02:03 )   PT: 13.3 sec;   INR: 1.11 ratio         PTT - ( 07 Jan 2022 02:03 )  PTT:26.2 sec  ABG - ( 08 Jan 2022 03:40 )  pH, Arterial: 7.42  pH, Blood: x     /  pCO2: 36    /  pO2: 80    / HCO3: 23    / Base Excess: -0.9  /  SaO2: 94.3                ======================Micro/Rad/Cardio=================  CXR: Reviewed  Echo:Reviewed  ======================================================  PAST MEDICAL & SURGICAL HISTORY:  HTN (hypertension)    Diabetes    Hyperlipidemia    CAD (coronary artery disease)    History of tonsillectomy      ====================ASSESSMENT ==============  CAD/ASHD s/p CABG 1/6   Hemodynamic instability  Anemia blood loss  Atrial fibrillation   Hyperglycemia                                                                                                                        Former smoker  Hypertension   Diabetes mellitus type 2  Hyperlipidemia       Plan:  ====================== NEUROLOGY=====================  Continue close monitoring of neuro status   Tylenol, Gabapentin, Hydromorphone, and Oxycodone PRN for analgesia   OOBTC     acetaminophen     Tablet .. 650 milliGRAM(s) Oral every 6 hours  gabapentin 100 milliGRAM(s) Oral every 8 hours  HYDROmorphone  Injectable 0.5 milliGRAM(s) IV Push every 6 hours PRN Severe Pain (7 - 10)  oxyCODONE    IR 5 milliGRAM(s) Oral every 4 hours PRN Moderate Pain (4 - 6)  oxyCODONE    IR 10 milliGRAM(s) Oral every 4 hours PRN Severe Pain (7 - 10)    ==================== RESPIRATORY======================  Stable on  nasal cannula 4 liter/minute, SpO2 94% - 100%  Encourage incentive spirometry, continue pulse ox monitoring, follow ABGs     ====================CARDIOVASCULAR==================  CAD s/p CABG 1/6, IABP placement 1/6  with good augmentation 1:1    intra-aortic balloon pump was D/C day yesterday 1/7  New onset atrial fibrillation today fast heart rate started on amiodarone drip and increase dose Lopressor  Continue invasive hemodynamic monitoring   Lactate 0.8, continue trending   Lopressor for rate control ,  increase dose to 25 mg q.8 hours and then 50 mg q.8 hours monitor blood pressure which is borderline systolic in the 90s diastolic in the 50s  ASA/Lipitor for CAD     metoprolol tartrate 25 milliGRAM(s) Oral every 8 hours  atorvastatin 80 milliGRAM(s) Oral at bedtime  aspirin enteric coated 325 milliGRAM(s) Oral daily    ===================HEMATOLOGIC/ONC ===================  Continue to monitor H&H/Plts    received 1 PRBC, repeat CBC in the afternoon to check hemoglobin hematocrit  VTE prophylaxis, enoxaparin Injectable 40 milliGRAM(s) SubCutaneous daily    ===================== RENAL =========================  Merrill for monitoring urine output  Continue monitoring I&Os and BUN/Cr  Replete lytes PRN. Keep K> 4 and Mg >2    ==================== GASTROINTESTINAL===================  Tolerating regular PO diet   Bowel regimen with Miralax and Senna     ascorbic acid 500 milliGRAM(s) Oral two times a day  bisacodyl Suppository 10 milliGRAM(s) Rectal once  dextrose 5%. 1000 milliLiter(s) (50 mL/Hr) IV Continuous <Continuous>  multivitamin 1 Tablet(s) Oral daily  GI prophylaxis, pantoprazole    Tablet 40 milliGRAM(s) Oral before breakfast  polyethylene glycol 3350 17 Gram(s) Oral daily  senna 2 Tablet(s) Oral at bedtime  sodium chloride 0.9%. 1000 milliLiter(s) (10 mL/Hr) IV Continuous <Continuous>    =======================    ENDOCRINE  =====================  DM2 continue glycemic control with insulin gtt     dextrose 40% Gel 15 Gram(s) Oral once  dextrose 50% Injectable 50 milliLiter(s) IV Push every 15 minutes  glucagon  Injectable 1 milliGRAM(s) IntraMuscular once  insulin regular Infusion 3 Unit(s)/Hr (3 mL/Hr) IV Continuous <Continuous>    ========================INFECTIOUS DISEASE================  Temp 99.1F, WBC within normal limits  Continue trending WBC and monitoring fever curve     Patient requires continuous monitoring with:  bedside rhythm monitoring, arterial line, pulse oximetry, ventilator management and monitoring; intermittent blood gas analysis.  Care plan discussed with ICU care team.  patient remain critical; required more than usual post op care; I have spent 35 minutes providing non routine post op care, revaluated multiple times.      By signing my name below, I, Maggie Fontana, attest that this documentation has been prepared under the direction and in the presence of Madie Arana MD.  Electronically signed: Dre Valentine, 01-08-22 @ 08:52    I, Madie Arana, personally performed the services described in this documentation. all medical record entries made by the dre were at my direction and in my presence. I have reviewed the chart and agree that the record reflects my personal performance and is accurate and complete  Electronically signed: Madie Arana, 01-08-22 @ 08:52       no radiation

## 2024-05-09 ENCOUNTER — EMERGENCY (EMERGENCY)
Facility: HOSPITAL | Age: 54
LOS: 1 days | Discharge: HOME | End: 2024-05-09
Attending: EMERGENCY MEDICINE
Payer: MEDICARE

## 2024-05-09 VITALS
HEART RATE: 92 BPM | WEIGHT: 149.91 LBS | OXYGEN SATURATION: 99 % | TEMPERATURE: 98 F | DIASTOLIC BLOOD PRESSURE: 63 MMHG | RESPIRATION RATE: 17 BRPM | HEIGHT: 63 IN | SYSTOLIC BLOOD PRESSURE: 106 MMHG

## 2024-05-09 DIAGNOSIS — Z98.89 OTHER SPECIFIED POSTPROCEDURAL STATES: Chronic | ICD-10-CM

## 2024-05-09 DIAGNOSIS — Z98.891 HISTORY OF UTERINE SCAR FROM PREVIOUS SURGERY: Chronic | ICD-10-CM

## 2024-05-09 DIAGNOSIS — N60.02 SOLITARY CYST OF LEFT BREAST: Chronic | ICD-10-CM

## 2024-05-09 DIAGNOSIS — Z98.890 OTHER SPECIFIED POSTPROCEDURAL STATES: Chronic | ICD-10-CM

## 2024-05-09 DIAGNOSIS — Z98.51 TUBAL LIGATION STATUS: Chronic | ICD-10-CM

## 2024-05-09 DIAGNOSIS — M25.571 PAIN IN RIGHT ANKLE AND JOINTS OF RIGHT FOOT: Chronic | ICD-10-CM

## 2024-05-09 LAB
ALBUMIN SERPL ELPH-MCNC: 4 G/DL — SIGNIFICANT CHANGE UP (ref 3.5–5)
ALP SERPL-CCNC: 88 U/L — SIGNIFICANT CHANGE UP (ref 40–120)
ALT FLD-CCNC: 22 U/L DA — SIGNIFICANT CHANGE UP (ref 10–60)
ANION GAP SERPL CALC-SCNC: 8 MMOL/L — SIGNIFICANT CHANGE UP (ref 5–17)
APPEARANCE UR: CLEAR — SIGNIFICANT CHANGE UP
AST SERPL-CCNC: 21 U/L — SIGNIFICANT CHANGE UP (ref 10–40)
BACTERIA # UR AUTO: ABNORMAL /HPF
BASOPHILS # BLD AUTO: 0.06 K/UL — SIGNIFICANT CHANGE UP (ref 0–0.2)
BASOPHILS NFR BLD AUTO: 0.5 % — SIGNIFICANT CHANGE UP (ref 0–2)
BILIRUB SERPL-MCNC: 0.5 MG/DL — SIGNIFICANT CHANGE UP (ref 0.2–1.2)
BILIRUB UR-MCNC: NEGATIVE — SIGNIFICANT CHANGE UP
BUN SERPL-MCNC: 11 MG/DL — SIGNIFICANT CHANGE UP (ref 7–18)
CALCIUM SERPL-MCNC: 9.1 MG/DL — SIGNIFICANT CHANGE UP (ref 8.4–10.5)
CHLORIDE SERPL-SCNC: 101 MMOL/L — SIGNIFICANT CHANGE UP (ref 96–108)
CO2 SERPL-SCNC: 24 MMOL/L — SIGNIFICANT CHANGE UP (ref 22–31)
COLOR SPEC: YELLOW — SIGNIFICANT CHANGE UP
CREAT SERPL-MCNC: 0.62 MG/DL — SIGNIFICANT CHANGE UP (ref 0.5–1.3)
DIFF PNL FLD: NEGATIVE — SIGNIFICANT CHANGE UP
EGFR: 106 ML/MIN/1.73M2 — SIGNIFICANT CHANGE UP
EOSINOPHIL # BLD AUTO: 0.08 K/UL — SIGNIFICANT CHANGE UP (ref 0–0.5)
EOSINOPHIL NFR BLD AUTO: 0.7 % — SIGNIFICANT CHANGE UP (ref 0–6)
EPI CELLS # UR: PRESENT
ERYTHROCYTE [SEDIMENTATION RATE] IN BLOOD: 7 MM/HR — SIGNIFICANT CHANGE UP (ref 0–20)
GLUCOSE SERPL-MCNC: 126 MG/DL — HIGH (ref 70–99)
GLUCOSE UR QL: >=1000 MG/DL
HCT VFR BLD CALC: 50.4 % — HIGH (ref 34.5–45)
HGB BLD-MCNC: 16.4 G/DL — HIGH (ref 11.5–15.5)
IMM GRANULOCYTES NFR BLD AUTO: 0.3 % — SIGNIFICANT CHANGE UP (ref 0–0.9)
KETONES UR-MCNC: >=160 MG/DL
LEUKOCYTE ESTERASE UR-ACNC: ABNORMAL
LYMPHOCYTES # BLD AUTO: 26.1 % — SIGNIFICANT CHANGE UP (ref 13–44)
LYMPHOCYTES # BLD AUTO: 3.15 K/UL — SIGNIFICANT CHANGE UP (ref 1–3.3)
MCHC RBC-ENTMCNC: 29.9 PG — SIGNIFICANT CHANGE UP (ref 27–34)
MCHC RBC-ENTMCNC: 32.5 GM/DL — SIGNIFICANT CHANGE UP (ref 32–36)
MCV RBC AUTO: 91.8 FL — SIGNIFICANT CHANGE UP (ref 80–100)
MONOCYTES # BLD AUTO: 0.82 K/UL — SIGNIFICANT CHANGE UP (ref 0–0.9)
MONOCYTES NFR BLD AUTO: 6.8 % — SIGNIFICANT CHANGE UP (ref 2–14)
NEUTROPHILS # BLD AUTO: 7.9 K/UL — HIGH (ref 1.8–7.4)
NEUTROPHILS NFR BLD AUTO: 65.6 % — SIGNIFICANT CHANGE UP (ref 43–77)
NITRITE UR-MCNC: NEGATIVE — SIGNIFICANT CHANGE UP
NRBC # BLD: 0 /100 WBCS — SIGNIFICANT CHANGE UP (ref 0–0)
PH UR: 5.5 — SIGNIFICANT CHANGE UP (ref 5–8)
PLATELET # BLD AUTO: 400 K/UL — SIGNIFICANT CHANGE UP (ref 150–400)
POTASSIUM SERPL-MCNC: 4.3 MMOL/L — SIGNIFICANT CHANGE UP (ref 3.5–5.3)
POTASSIUM SERPL-SCNC: 4.3 MMOL/L — SIGNIFICANT CHANGE UP (ref 3.5–5.3)
PROT SERPL-MCNC: 7.9 G/DL — SIGNIFICANT CHANGE UP (ref 6–8.3)
PROT UR-MCNC: NEGATIVE MG/DL — SIGNIFICANT CHANGE UP
RBC # BLD: 5.49 M/UL — HIGH (ref 3.8–5.2)
RBC # FLD: 12.6 % — SIGNIFICANT CHANGE UP (ref 10.3–14.5)
RBC CASTS # UR COMP ASSIST: 3 /HPF — SIGNIFICANT CHANGE UP (ref 0–4)
SODIUM SERPL-SCNC: 133 MMOL/L — LOW (ref 135–145)
SP GR SPEC: 1.02 — SIGNIFICANT CHANGE UP (ref 1–1.03)
UROBILINOGEN FLD QL: 0.2 MG/DL — SIGNIFICANT CHANGE UP (ref 0.2–1)
WBC # BLD: 12.05 K/UL — HIGH (ref 3.8–10.5)
WBC # FLD AUTO: 12.05 K/UL — HIGH (ref 3.8–10.5)
WBC UR QL: 5 /HPF — SIGNIFICANT CHANGE UP (ref 0–5)

## 2024-05-09 PROCEDURE — 80053 COMPREHEN METABOLIC PANEL: CPT

## 2024-05-09 PROCEDURE — 85025 COMPLETE CBC W/AUTO DIFF WBC: CPT

## 2024-05-09 PROCEDURE — 96375 TX/PRO/DX INJ NEW DRUG ADDON: CPT

## 2024-05-09 PROCEDURE — 73610 X-RAY EXAM OF ANKLE: CPT

## 2024-05-09 PROCEDURE — 81001 URINALYSIS AUTO W/SCOPE: CPT

## 2024-05-09 PROCEDURE — 73562 X-RAY EXAM OF KNEE 3: CPT

## 2024-05-09 PROCEDURE — 85652 RBC SED RATE AUTOMATED: CPT

## 2024-05-09 PROCEDURE — 73590 X-RAY EXAM OF LOWER LEG: CPT

## 2024-05-09 PROCEDURE — 96374 THER/PROPH/DIAG INJ IV PUSH: CPT

## 2024-05-09 PROCEDURE — 99285 EMERGENCY DEPT VISIT HI MDM: CPT

## 2024-05-09 PROCEDURE — 99284 EMERGENCY DEPT VISIT MOD MDM: CPT | Mod: 25

## 2024-05-09 PROCEDURE — 82962 GLUCOSE BLOOD TEST: CPT

## 2024-05-09 PROCEDURE — 73610 X-RAY EXAM OF ANKLE: CPT | Mod: 26,LT

## 2024-05-09 PROCEDURE — 73590 X-RAY EXAM OF LOWER LEG: CPT | Mod: 26,LT

## 2024-05-09 PROCEDURE — 73562 X-RAY EXAM OF KNEE 3: CPT | Mod: 26,LT

## 2024-05-09 PROCEDURE — 86140 C-REACTIVE PROTEIN: CPT

## 2024-05-09 PROCEDURE — 87086 URINE CULTURE/COLONY COUNT: CPT

## 2024-05-09 PROCEDURE — 36415 COLL VENOUS BLD VENIPUNCTURE: CPT

## 2024-05-09 RX ORDER — SODIUM CHLORIDE 9 MG/ML
1000 INJECTION INTRAMUSCULAR; INTRAVENOUS; SUBCUTANEOUS ONCE
Refills: 0 | Status: COMPLETED | OUTPATIENT
Start: 2024-05-09 | End: 2024-05-09

## 2024-05-09 RX ORDER — KETOROLAC TROMETHAMINE 30 MG/ML
15 SYRINGE (ML) INJECTION ONCE
Refills: 0 | Status: DISCONTINUED | OUTPATIENT
Start: 2024-05-09 | End: 2024-05-09

## 2024-05-09 RX ORDER — ACETAMINOPHEN 500 MG
1000 TABLET ORAL ONCE
Refills: 0 | Status: COMPLETED | OUTPATIENT
Start: 2024-05-09 | End: 2024-05-09

## 2024-05-09 RX ADMIN — SODIUM CHLORIDE 1000 MILLILITER(S): 9 INJECTION INTRAMUSCULAR; INTRAVENOUS; SUBCUTANEOUS at 20:35

## 2024-05-09 RX ADMIN — Medication 15 MILLIGRAM(S): at 21:05

## 2024-05-09 RX ADMIN — SODIUM CHLORIDE 1000 MILLILITER(S): 9 INJECTION INTRAMUSCULAR; INTRAVENOUS; SUBCUTANEOUS at 22:33

## 2024-05-09 RX ADMIN — Medication 400 MILLIGRAM(S): at 22:33

## 2024-05-09 RX ADMIN — Medication 15 MILLIGRAM(S): at 20:35

## 2024-05-09 RX ADMIN — Medication 1000 MILLIGRAM(S): at 23:03

## 2024-05-09 NOTE — ED PROVIDER NOTE - PROGRESS NOTE DETAILS
Labs notable for mild leukocytosis, hemoconcentrated, UA with questionable UTI discussed with patient and opting to defer for urine culture results.  Patient states improvement in headache, mild improvement in bilateral lower extremity pain.  Red flag signs and symptoms discussed with patient as well as strict return precautions patient verbalized understanding stable for DC.

## 2024-05-09 NOTE — ED PROVIDER NOTE - PATIENT PORTAL LINK FT
You can access the FollowMyHealth Patient Portal offered by [facility] by registering at the following website: [email or web address]. By joining [employer]’s FollowMyHealth portal, you will also be able to view your health information using other applications (apps) compatible with our system.

## 2024-05-09 NOTE — ED PROVIDER NOTE - CLINICAL SUMMARY MEDICAL DECISION MAKING FREE TEXT BOX
53-year-old female PMH DM2, hyperlipidemia presenting to emergency department for headache, nausea, 2 episodes of nonbloody vomiting, decreased p.o. intake, left lower extremity pain, dysuria since Tuesday. No known  exacerbating or remitting factors.  Denies chest pain, shortness of breath, abdominal pain, diarrhea, fever, numbness, visual change, photophobia, difficulty ambulating, other complaint.   PE as above, do not suspect ICH/CVA/space occupying lesion, acute bony vertebral or acute spinal cord pathology given reassuring neurological exam.   No DVT risk factors,  and exam not consistent with DVT.   No trauma, patient ambulatory with normal extremity exam do not suspect fracture or dislocation. No CVA tenderness do not suspect nephrolithiases or pyelonephritis.  No focal abdominal tenderness do not suspect acute surgical intra-abdominal pathology.   possible viral syndrome with MSK related pain, UTI, will obtain labs, analgesia, x-ray, reassess, dispo accordingly. 53-year-old female PMH DM2, hyperlipidemia presenting to emergency department for headache, nausea, 2 episodes of nonbloody vomiting, decreased p.o. intake, left lower extremity pain since Tuesday. No known  exacerbating or remitting factors.  Also notes that she is urinating forcefully but denies dysuria. Denies chest pain, shortness of breath, abdominal pain, diarrhea, fever, numbness, visual change, photophobia, difficulty ambulating, other complaint.  PE as above, do not suspect ICH/CVA/space occupying lesion, acute bony vertebral or acute spinal cord pathology given reassuring neurological exam.   No DVT risk factors,  and exam not consistent with DVT.   No trauma, patient ambulatory with normal extremity exam do not suspect fracture or dislocation. No CVA tenderness do not suspect nephrolithiases or pyelonephritis.  No focal abdominal tenderness do not suspect acute surgical intra-abdominal pathology.   possible viral syndrome with MSK related pain, UTI, will obtain labs, analgesia, x-ray, reassess, dispo accordingly.

## 2024-05-09 NOTE — ED PROVIDER NOTE - NSFOLLOWUPINSTRUCTIONS_ED_ALL_ED_FT
1) Follow up with your doctor as discussed  2) Return to the ED immediately for new or worsening symptoms   3) Please continue to take any home medications as prescribed  4) Your test results from your ED visit were discussed with you prior to discharge  5) You were provided with a copy of your test results  6) We have sent medications to your pharmacy, please take as directed.

## 2024-05-09 NOTE — ED PROVIDER NOTE - NS ED ROS FT
Otherwise as HPI or negative. I will STOP taking the medications listed below when I get home from the hospital:    Victoza 18 mg/3 mL subcutaneous solution  -- 1.8 milligram(s) subcutaneous once a day (in the morning)    Diovan 40 mg oral tablet  -- 0.5 tab(s) by mouth once a day    Lantus 100 units/mL subcutaneous solution  -- 12 unit(s) subcutaneous once a day (at bedtime)    NovoLOG 100 units/mL injectable solution  -- depending on carb count 3 times a day with meals

## 2024-05-09 NOTE — ED PROVIDER NOTE - CARE PLAN
1 Principal Discharge DX:	Headache syndrome   Principal Discharge DX:	Headache syndrome  Secondary Diagnosis:	Left leg pain

## 2024-05-09 NOTE — ED PROVIDER NOTE - PHYSICAL EXAMINATION
Gen: NAD, AOx3, able to make needs known, non-toxic  Head: NCAT  HEENT: EOMI, oral mucosa moist, normal conjunctiva  Lung: CTAB, no respiratory distress, no wheezes/rhonchi/rales B/L, speaking in full sentences  CV: RRR, no murmurs  Abd: non distended, soft, nontender, no guarding, no CVA tenderness  MSK: no visible deformities, no midline spinal ttp, FROM upper and lower extremities, 5/5 strength in all 4 extremities, sensation intact  Neuro: Appears non focal  Skin: Warm, well perfused, no rash  Psych: normal affect Gen: NAD, AOx3, able to make needs known, non-toxic  Head: NCAT  HEENT: EOMI, oral mucosa moist, normal conjunctiva  Lung: CTAB, no respiratory distress, no wheezes/rhonchi/rales B/L, speaking in full sentences  CV: RRR, no murmurs  Abd: non distended, soft, nontender, no guarding, no CVA tenderness  MSK: no visible deformities, no midline spinal ttp, FROM upper and lower extremities, 5/5 strength in all 4 extremities, sensation intact  Left knee: No discoloration, erythema, ecchymosis, deformity or swelling. Popliteal, dorsalis pedis and posterior tibial pulses equally strong 2+ bilaterally. Capillary refill in lower extremity < 2 seconds. Full range of motion during flexion, extension and hyperextension. No evidence of locked knee.  No patellar, femur or tibia tenderness on palpation. No crepitus. Able to bear weight. No joint laxity during varus and valgus stress test.  Neuro: Appears non focal  Skin: Warm, well perfused, no rash  Psych: normal affect

## 2024-05-09 NOTE — ED PROVIDER NOTE - OBJECTIVE STATEMENT
53-year-old female PMH DM2, hyperlipidemia presenting to emergency department for headache, nausea, 2 episodes of nonbloody vomiting, decreased p.o. intake, left lower extremity pain, dysuria since Tuesday. No known  exacerbating or remitting factors.  Denies chest pain, shortness of breath, abdominal pain, diarrhea, fever, numbness, visual change, photophobia, difficulty ambulating, other complaint. 53-year-old female PMH DM2, hyperlipidemia presenting to emergency department for headache, nausea, 2 episodes of nonbloody vomiting, decreased p.o. intake, left lower extremity pain since Tuesday. No known  exacerbating or remitting factors.  Also notes that she is urinating forcefully but denies dysuria. Denies chest pain, shortness of breath, abdominal pain, diarrhea, fever, numbness, visual change, photophobia, difficulty ambulating, other complaint.

## 2024-05-10 VITALS
HEART RATE: 81 BPM | OXYGEN SATURATION: 96 % | RESPIRATION RATE: 18 BRPM | TEMPERATURE: 98 F | SYSTOLIC BLOOD PRESSURE: 103 MMHG | DIASTOLIC BLOOD PRESSURE: 69 MMHG

## 2024-05-10 LAB — CRP SERPL-MCNC: 7 MG/L — HIGH

## 2024-05-11 LAB
CULTURE RESULTS: SIGNIFICANT CHANGE UP
SPECIMEN SOURCE: SIGNIFICANT CHANGE UP

## 2024-07-08 ENCOUNTER — APPOINTMENT (OUTPATIENT)
Dept: ORTHOPEDIC SURGERY | Facility: CLINIC | Age: 54
End: 2024-07-08

## 2024-08-15 NOTE — ED ADULT TRIAGE NOTE - BP NONINVASIVE SYSTOLIC (MM HG)
8/15/2024       RE: Angeli Jacobson  [id number]  [facility] [id number]     Dear Colleague,    Thank you for referring your patient, Angeli Jacobson, to the [facility] [place] at [facility]. Please see a copy of my visit note below.    Subjective     CHIEF COMPLAINT/REASON FOR VISIT   Patient of Dr. Oquendo's seen on my calendar  Follow up on OAB    HISTORY OF PRESENT ILLNESS   Ms. Jacobson is very pleasant 74 year old year old female, who presents today for follow-up on overactivity of the bladder.  I last saw her on 07/18/2023.  She previously followed with Dr. Oquendo.  She also has gone to pelvic floor physical therapy for pelvic floor dysfunction.    Patient has trialed Toviaz, oxybutynin, Myrbetriq.  She has been on Vesicare 5 mg daily for a while and symptoms are much better managed on this.  At her last visit, we discussed increasing this to 10 mg daily, and election was eventually made to hold off.    Patient feels like her symptoms are relatively well-managed.  She still has some mild urgency and frequency.  Denies any hematuria, dysuria recurrent UTI symptoms.  She did feel like she empties her bladder today, postvoid residual was elevated at 247 mL.  Her last postvoid residual was 72 mL.  Patient does note double and even triple voiding in the morning.  She has nocturia typically 0-1 times.  She will find herself double voiding with a more modest Such as needing to go when she gets home from here.    The following portions of the patient's history were reviewed and updated as appropriate: allergies, current medications, past family history, past medical history, past social history, past surgical history, and problem list.     REVIEW OF SYSTEMS   Review of Systems   Constitutional:  Negative for chills and fever.   Respiratory:  Negative for shortness of breath.    Cardiovascular:  Negative for chest pain.    Gastrointestinal:  Negative for nausea and vomiting.   Genitourinary:  Positive for frequency and urgency. Negative for dysuria and hematuria.      Per HPI.     Patient Active Problem List   Diagnosis     MULTINODUL GOITER     Hearing loss     Hyperlipidemia LDL goal <70     Osteoporosis     Impaired fasting glucose     Lymphedema of left leg     Tubular adenoma     Vertigo     Myxoid liposarcoma (HCC)     PAD (peripheral artery disease) (H24)     Vascular graft occlusion (H24)     Femoral-popliteal bypass graft occlusion, left (H24)     History of sarcoma     S/P ORIF (open reduction internal fixation) fracture     Hip pain, left     Postural urinary incontinence     Personal history of urinary tract infection     OAB (overactive bladder)     Myalgia of pelvic floor     Lesion of bladder     S/P total hip arthroplasty     Acute pain of left knee     Closed fracture of left tibia and fibula, initial encounter     Closed displaced fracture of left patella, unspecified fracture morphology, initial encounter     Other specified injury of left quadriceps muscle, fascia and tendon, initial encounter     Elevated coronary artery calcium score=7/1/21      Arthrofibrosis of knee joint, left     Hypothyroidism, unspecified type     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 , estrogen receptor positive (H) Dec 2022 - s/p lumpectomy + radiation     Left knee pain     Long term current use of aromatase inhibitor     ALEXANDER (obstructive sleep apnea)      Past Medical History:   Diagnosis Date     * * * SBE PROPHYLAXIS * * * 1998    Amox 500mg, take 4 tabs one hour prior to procedure.Takes this because of lymphedema secondary from leg surgey     Antiplatelet or antithrombotic long-term use      Arrhythmia      Central serous retinopathy 2001    Resolved 9/2001     CHRONIC NECK PAIN 1995     Depressive disorder      Depressive disorder, not elsewhere classified 2001     Elevated coronary artery calcium score=7/1/21   "08/03/2021     Elevated coronary artery calcium score=7/1/21 08/03/2021     History of blood transfusion 04/2019 12/2020    during left hip pinning, during surgery for patella     Lateral epicondylitis      MIXED HYPERLIPIDEMIA, LDL GOAL <160 1998    LDL goal < 160     Motion sickness      MYXOID LIPOSARCOMA 2000    Left thigh, S/P excision, radiation  at 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/2004    CHRONIC LEFT THIGH LYMPHEDEMA     Nontoxic multinodular goiter 2005    needs yearly US     ALEXANDER (obstructive sleep apnea) 7/3/2024     Osteoporosis, unspecified 2001     Other chronic pain     fx ribs left      Other lymphedema 2000    left thigh, gets regular PT for this     Overactive bladder      PAD (peripheral artery disease) (H24) 04/20/2018     PONV (postoperative nausea and vomiting)      SHINGLES 2001     Sprain of lumbosacral (joint) (ligament) 1995    right     Unspecified hearing loss 1998    chronic tinnitus     Unspecified tinnitus 1998        Objective     PHYSICAL EXAM   /73   Pulse 76   Ht 1.664 m (5' 5.5\")   Wt 70.8 kg (156 lb)   LMP  (LMP Unknown)   BMI 25.56 kg/m     Physical Exam  Constitutional:       Appearance: Normal appearance.   HENT:      Head: Normocephalic.   Eyes:      General: No scleral icterus.  Pulmonary:      Effort: Pulmonary effort is normal.   Musculoskeletal:      Comments: Ambulates with a cane.   Skin:     Findings: No rash.   Neurological:      General: No focal deficit present.      Mental Status: She is alert and oriented to person, place, and time.   Psychiatric:         Mood and Affect: Mood normal.         Behavior: Behavior normal.       TESTING    PVR: 247 mL    Assessment & Plan   1. OAB (overactive bladder)        I had the pleasure today of meeting with Ms. Jacobson to discuss her follow-up for overactivity of the bladder.  She is feeling like her symptoms are relatively under control.  At times, she will have symptoms of double voiding or triple " voiding in the morning.  Her postvoid residual is a fair amount higher than previously.  She does endorse having drink a large amount of water right before she came.  We discussed a conservative reevaluation of this would be for a nurse visit if in approximately a month with rechecking a postvoid residual.    We also discussed ways to help her empty better such as intentionally double voiding and shifting positions when urinating.    Will plan on the following:    -Continue with Vesicare 5 mg once daily.  Refills sent to the pharmacy for 1 year.     -Next option for urinary symptoms would be posterior tibial nerve stimulation or possible Botox.     -Would recommend that patient start intentionally double voiding and position changes with voiding, as post void was quite a bit higher than last year.      -Plan on follow up nurse visit for post void in 1 month to ensure that it is not increasing.  If improved, follow up in 1 year for med check and post void residual.     Contact us in the interim with questions, concerns, or changes in symptomatology.    Signed by:       Nancy Chawla PA-C 8/15/2024 1:30 PM       Again, thank you for allowing me to participate in the care of your patient.      Sincerely,    Nancy Chawla PA-C     [phone]

## 2024-11-14 NOTE — ED ADULT NURSE NOTE - NS PRO PASSIVE SMOKE EXP
[Follow - Up] : a follow-up visit [Hypothyroidism] : hypothyroidism [Thyroid Cancer] : thyroid cancer No

## 2024-11-21 ENCOUNTER — APPOINTMENT (OUTPATIENT)
Dept: ORTHOPEDIC SURGERY | Facility: CLINIC | Age: 54
End: 2024-11-21

## 2025-06-05 NOTE — ED ADULT TRIAGE NOTE - STATUS:
Informed Consent for Blood Component Transfusion Note    I have discussed with the patient the rationale for blood component transfusion; its benefits in treating or preventing fatigue, organ damage, or death; and its risk which includes mild transfusion reactions, rare risk of blood borne infection, or more serious but rare reactions. I have discussed the alternatives to transfusion, including the risk and consequences of not receiving transfusion. The patient had an opportunity to ask questions and had agreed to proceed with transfusion of blood components.    Electronically signed by Daniella Baptiste MD on 6/4/25 at 11:11 PM EDT   [employer]